# Patient Record
Sex: MALE | Race: BLACK OR AFRICAN AMERICAN | NOT HISPANIC OR LATINO | Employment: OTHER | URBAN - METROPOLITAN AREA
[De-identification: names, ages, dates, MRNs, and addresses within clinical notes are randomized per-mention and may not be internally consistent; named-entity substitution may affect disease eponyms.]

---

## 2023-03-02 LAB
CAMPYLOBACTER GP: NOT DETECTED
NOROVIRUS GI/GII: NOT DETECTED
ROTAVIRUS A: NOT DETECTED
SALMONELLA SP.: NOT DETECTED
SHIGA TOXIN 1: NOT DETECTED
SHIGA TOXIN 2: NOT DETECTED
SHIGELLA SP.: NOT DETECTED
VIBRIO GRP.: NOT DETECTED
YERSINIA ENTEROCOLITICA: NOT DETECTED

## 2023-03-05 LAB
FAT, FECAL - NEUTRAL: NORMAL
FAT, FECAL - SPLIT: NORMAL

## 2023-03-06 LAB — PANCREATIC ELASTASE, FECAL: >800 UG/G

## 2023-05-03 DIAGNOSIS — N52.9 ERECTILE DYSFUNCTION, UNSPECIFIED ERECTILE DYSFUNCTION TYPE: Primary | ICD-10-CM

## 2023-05-03 RX ORDER — TADALAFIL 5 MG/1
1 TABLET ORAL DAILY
COMMUNITY
Start: 2022-10-31 | End: 2023-05-03 | Stop reason: SDUPTHER

## 2023-05-03 RX ORDER — TADALAFIL 5 MG/1
5 TABLET ORAL DAILY
Qty: 90 TABLET | Refills: 0 | Status: SHIPPED | OUTPATIENT
Start: 2023-05-03 | End: 2023-06-14 | Stop reason: SDUPTHER

## 2023-05-10 ENCOUNTER — PATIENT OUTREACH (OUTPATIENT)
Dept: PRIMARY CARE | Facility: CLINIC | Age: 73
End: 2023-05-10
Payer: COMMERCIAL

## 2023-05-10 DIAGNOSIS — J44.1 ACUTE EXACERBATION OF CHRONIC OBSTRUCTIVE AIRWAYS DISEASE (MULTI): ICD-10-CM

## 2023-05-10 NOTE — PROGRESS NOTES
Discharge Facility:[facility]  Discharge Diagnosis:Acute Exacerbation of chronic obstructive airway disease  Admission Date:5/6/23  Discharge Date: 5/9/23    PCP Appointment Date:  Specialist Appointment Date:   Hospital Encounter and Summary: not available at this time   See discharge assessment below for further details   Engagement  Call Start Time: 1449 (5/10/2023  2:55 PM)    Medications  Medications reviewed with patient/caregiver?: Yes (5/10/2023  2:55 PM)  Is the patient having any side effects they believe may be caused by any medication additions or changes?: No (5/10/2023  2:55 PM)  Does the patient have all medications ordered at discharge?: Yes (5/10/2023  2:55 PM)  Prescription Comments: see med list (5/10/2023  2:55 PM)  Is the patient taking all medications as directed (includes completed medication regime)?: Yes (5/10/2023  2:55 PM)  Medication Comments: see med list (5/10/2023  2:55 PM)    Appointments  Does the patient have a primary care provider?: Yes (5/10/2023  2:55 PM)  Care Management Interventions: Advised patient to make appointment (patient will call office to schedule appt) (5/10/2023  2:55 PM)  Care Management Interventions: Advised to schedule with specialist (5/10/2023  2:55 PM)    Patient Teaching  Does the patient have access to their discharge instructions?: Yes (5/10/2023  2:55 PM)  Care Management Interventions: Reviewed instructions with patient (5/10/2023  2:55 PM)  What is the patient's perception of their health status since discharge?: Improving (5/10/2023  2:55 PM)

## 2023-05-16 DIAGNOSIS — G62.9 NEUROPATHY: Primary | ICD-10-CM

## 2023-05-17 RX ORDER — GABAPENTIN 100 MG/1
200 CAPSULE ORAL 3 TIMES DAILY
Qty: 540 CAPSULE | Refills: 0 | Status: SHIPPED | OUTPATIENT
Start: 2023-05-17 | End: 2023-07-31

## 2023-05-17 NOTE — TELEPHONE ENCOUNTER
Patient requesting refill of gabapentin  OARRS reviewed 5/17/2023  I have personally reviewed the OARRS report and I have considered the risk of abuse, dependence, addiction, and diversion. I believe it is clinically appropriate for this patient to be prescribed this medication based on documented diagnosis  Last fill 2/7/23 x 90days

## 2023-05-25 LAB
ALBUMIN (G/DL) IN SER/PLAS: 3.8 G/DL (ref 3.4–5)
ALBUMIN (MG/L) IN URINE: 106 MG/L
ALBUMIN/CREATININE (UG/MG) IN URINE: 164.1 UG/MG CRT (ref 0–30)
ANION GAP IN SER/PLAS: 12 MMOL/L (ref 10–20)
CALCIUM (MG/DL) IN SER/PLAS: 9.5 MG/DL (ref 8.6–10.6)
CARBON DIOXIDE, TOTAL (MMOL/L) IN SER/PLAS: 27 MMOL/L (ref 21–32)
CHLORIDE (MMOL/L) IN SER/PLAS: 111 MMOL/L (ref 98–107)
CREATININE (MG/DL) IN SER/PLAS: 2.44 MG/DL (ref 0.5–1.3)
CREATININE (MG/DL) IN URINE: 64.6 MG/DL (ref 20–370)
GFR MALE: 27 ML/MIN/1.73M2
GLUCOSE (MG/DL) IN SER/PLAS: 141 MG/DL (ref 74–99)
PARATHYRIN INTACT (PG/ML) IN SER/PLAS: 107 PG/ML (ref 18.5–88)
PHOSPHATE (MG/DL) IN SER/PLAS: 3.9 MG/DL (ref 2.5–4.9)
POTASSIUM (MMOL/L) IN SER/PLAS: 5 MMOL/L (ref 3.5–5.3)
SODIUM (MMOL/L) IN SER/PLAS: 145 MMOL/L (ref 136–145)
UREA NITROGEN (MG/DL) IN SER/PLAS: 27 MG/DL (ref 6–23)

## 2023-06-08 NOTE — PROGRESS NOTES
Unable to reach patient for call back after patient's follow up appointment with PCP.  LVM  with call back number for patient to call if needed.  Patient never followed up with PCP.

## 2023-06-13 PROBLEM — N18.9 CHRONIC KIDNEY DISEASE, UNSPECIFIED: Status: ACTIVE | Noted: 2023-06-13

## 2023-06-13 PROBLEM — R22.0 MASS OF SCALP: Status: ACTIVE | Noted: 2023-06-13

## 2023-06-13 PROBLEM — R19.5 LOOSE STOOLS: Status: ACTIVE | Noted: 2023-06-13

## 2023-06-13 PROBLEM — N18.30 CKD (CHRONIC KIDNEY DISEASE), STAGE III (MULTI): Status: ACTIVE | Noted: 2023-06-13

## 2023-06-13 PROBLEM — R33.8 ACUTE URINARY RETENTION: Status: RESOLVED | Noted: 2023-06-13 | Resolved: 2023-06-13

## 2023-06-13 PROBLEM — G47.00 INSOMNIA: Status: ACTIVE | Noted: 2023-06-13

## 2023-06-13 PROBLEM — M1A.9XX0 CHRONIC GOUT: Status: ACTIVE | Noted: 2023-06-13

## 2023-06-13 PROBLEM — I25.10 CORONARY ARTERY CALCIFICATION: Status: ACTIVE | Noted: 2023-06-13

## 2023-06-13 PROBLEM — N28.89 LEFT RENAL MASS: Status: ACTIVE | Noted: 2023-06-13

## 2023-06-13 PROBLEM — E21.3 HYPERPARATHYROIDISM (MULTI): Status: ACTIVE | Noted: 2023-06-13

## 2023-06-13 PROBLEM — R35.1 NOCTURIA MORE THAN TWICE PER NIGHT: Status: ACTIVE | Noted: 2023-06-13

## 2023-06-13 PROBLEM — D64.9 ANEMIA: Status: ACTIVE | Noted: 2023-06-13

## 2023-06-13 PROBLEM — R35.89 POLYURIA: Status: ACTIVE | Noted: 2023-06-13

## 2023-06-13 PROBLEM — J20.9 ACUTE BRONCHITIS: Status: RESOLVED | Noted: 2023-06-13 | Resolved: 2023-06-13

## 2023-06-13 PROBLEM — E78.00 HYPERCHOLESTEREMIA: Status: ACTIVE | Noted: 2023-06-13

## 2023-06-13 PROBLEM — C64.2 MALIGNANT NEOPLASM OF LEFT KIDNEY (MULTI): Status: ACTIVE | Noted: 2023-06-13

## 2023-06-13 PROBLEM — G62.9 NEUROPATHY: Status: ACTIVE | Noted: 2023-06-13

## 2023-06-13 PROBLEM — K86.1 CHRONIC PANCREATITIS (MULTI): Status: ACTIVE | Noted: 2023-06-13

## 2023-06-13 PROBLEM — G64 DISORDER OF PERIPHERAL NERVOUS SYSTEM: Status: ACTIVE | Noted: 2023-06-13

## 2023-06-13 PROBLEM — E46 MALNUTRITION (MULTI): Status: ACTIVE | Noted: 2023-06-13

## 2023-06-13 PROBLEM — J93.9 PNEUMOTHORAX: Status: ACTIVE | Noted: 2023-06-13

## 2023-06-13 PROBLEM — E04.1 NONTOXIC SINGLE THYROID NODULE: Status: ACTIVE | Noted: 2023-06-13

## 2023-06-13 PROBLEM — I10 BENIGN ESSENTIAL HYPERTENSION: Status: ACTIVE | Noted: 2023-06-13

## 2023-06-13 PROBLEM — N20.0 NEPHROLITHIASIS: Status: ACTIVE | Noted: 2023-06-13

## 2023-06-13 PROBLEM — E53.8 VITAMIN B12 DEFICIENCY (NON ANEMIC): Status: ACTIVE | Noted: 2023-06-13

## 2023-06-13 PROBLEM — H25.13 AGE-RELATED NUCLEAR CATARACT OF BOTH EYES: Status: ACTIVE | Noted: 2023-06-13

## 2023-06-13 PROBLEM — I31.39 PERICARDIAL EFFUSION (HHS-HCC): Status: ACTIVE | Noted: 2023-06-13

## 2023-06-13 PROBLEM — E11.40 DIABETIC NEUROPATHY (MULTI): Status: ACTIVE | Noted: 2023-06-13

## 2023-06-13 PROBLEM — H43.23 ASTEROID HYALOSIS OF BOTH EYES: Status: ACTIVE | Noted: 2023-06-13

## 2023-06-13 PROBLEM — J43.9 BLEB, LUNG (MULTI): Status: ACTIVE | Noted: 2023-06-13

## 2023-06-13 PROBLEM — I25.84 CORONARY ARTERY CALCIFICATION: Status: ACTIVE | Noted: 2023-06-13

## 2023-06-13 PROBLEM — R93.89 ABNORMAL CHEST CT: Status: ACTIVE | Noted: 2023-06-13

## 2023-06-13 PROBLEM — E11.3293 MILD NONPROLIFERATIVE DIABETIC RETINOPATHY OF BOTH EYES WITHOUT MACULAR EDEMA (MULTI): Status: ACTIVE | Noted: 2023-06-13

## 2023-06-13 PROBLEM — E55.9 VITAMIN D DEFICIENCY: Status: ACTIVE | Noted: 2023-06-13

## 2023-06-13 PROBLEM — K76.9 CHRONIC LIVER DISEASE: Status: ACTIVE | Noted: 2023-06-13

## 2023-06-13 PROBLEM — E83.51 HYPOCALCEMIA: Status: ACTIVE | Noted: 2023-06-13

## 2023-06-13 PROBLEM — F10.10 ALCOHOL ABUSE: Status: ACTIVE | Noted: 2023-06-13

## 2023-06-13 PROBLEM — M79.672 ACUTE FOOT PAIN, LEFT: Status: RESOLVED | Noted: 2023-06-13 | Resolved: 2023-06-13

## 2023-06-13 PROBLEM — R31.29 MICROSCOPIC HEMATURIA: Status: ACTIVE | Noted: 2023-06-13

## 2023-06-13 PROBLEM — N52.9 ED (ERECTILE DYSFUNCTION): Status: ACTIVE | Noted: 2023-06-13

## 2023-06-13 PROBLEM — C64.9 RENAL CELL CARCINOMA (MULTI): Status: ACTIVE | Noted: 2023-06-13

## 2023-06-13 PROBLEM — E53.8 FOLIC ACID DEFICIENCY: Status: ACTIVE | Noted: 2023-06-13

## 2023-06-13 PROBLEM — R09.89 CHEST CONGESTION: Status: ACTIVE | Noted: 2023-06-13

## 2023-06-13 PROBLEM — N40.0 BPH (BENIGN PROSTATIC HYPERPLASIA): Status: ACTIVE | Noted: 2023-06-13

## 2023-06-13 PROBLEM — K76.9 LIVER LESION: Status: ACTIVE | Noted: 2023-06-13

## 2023-06-13 PROBLEM — K86.9 LESION OF PANCREAS (HHS-HCC): Status: ACTIVE | Noted: 2023-06-13

## 2023-06-13 PROBLEM — J96.10 CHRONIC RESPIRATORY FAILURE (MULTI): Status: ACTIVE | Noted: 2023-06-13

## 2023-06-13 PROBLEM — R91.8 LUNG NODULES: Status: ACTIVE | Noted: 2023-06-13

## 2023-06-13 PROBLEM — N20.1 LEFT URETERAL CALCULUS: Status: ACTIVE | Noted: 2023-06-13

## 2023-06-13 PROBLEM — J44.9 COPD (CHRONIC OBSTRUCTIVE PULMONARY DISEASE) (MULTI): Status: ACTIVE | Noted: 2023-06-13

## 2023-06-13 PROBLEM — R09.02 HYPOXIA: Status: ACTIVE | Noted: 2023-06-13

## 2023-06-13 PROBLEM — K21.00 GERD WITH ESOPHAGITIS: Status: ACTIVE | Noted: 2023-06-13

## 2023-06-13 PROBLEM — N17.9 AKI (ACUTE KIDNEY INJURY) (CMS-HCC): Status: ACTIVE | Noted: 2023-06-13

## 2023-06-13 PROBLEM — J90 PLEURAL EFFUSION: Status: ACTIVE | Noted: 2023-06-13

## 2023-06-13 PROBLEM — R53.83 FATIGUE: Status: ACTIVE | Noted: 2023-06-13

## 2023-06-13 PROBLEM — K86.3 PSEUDOCYST OF PANCREAS (HHS-HCC): Status: ACTIVE | Noted: 2023-06-13

## 2023-06-13 PROBLEM — R00.0 TACHYCARDIA: Status: ACTIVE | Noted: 2023-06-13

## 2023-06-13 PROBLEM — K85.20 ALCOHOL-INDUCED ACUTE PANCREATITIS WITHOUT INFECTION OR NECROSIS (HHS-HCC): Status: ACTIVE | Noted: 2023-06-13

## 2023-06-13 PROBLEM — D37.8 NEOPLASM OF UNCERTAIN BEHAVIOR OF PANCREAS: Status: ACTIVE | Noted: 2023-06-13

## 2023-06-14 ENCOUNTER — OFFICE VISIT (OUTPATIENT)
Dept: PRIMARY CARE | Facility: CLINIC | Age: 73
End: 2023-06-14
Payer: COMMERCIAL

## 2023-06-14 VITALS
OXYGEN SATURATION: 96 % | WEIGHT: 132 LBS | HEART RATE: 60 BPM | BODY MASS INDEX: 20.67 KG/M2 | DIASTOLIC BLOOD PRESSURE: 68 MMHG | SYSTOLIC BLOOD PRESSURE: 127 MMHG

## 2023-06-14 DIAGNOSIS — C64.2 RENAL CELL CARCINOMA OF LEFT KIDNEY (MULTI): ICD-10-CM

## 2023-06-14 DIAGNOSIS — E78.00 HYPERCHOLESTEREMIA: ICD-10-CM

## 2023-06-14 DIAGNOSIS — N52.9 ERECTILE DYSFUNCTION, UNSPECIFIED ERECTILE DYSFUNCTION TYPE: ICD-10-CM

## 2023-06-14 DIAGNOSIS — J96.11 CHRONIC RESPIRATORY FAILURE WITH HYPOXIA (MULTI): ICD-10-CM

## 2023-06-14 DIAGNOSIS — I10 BENIGN ESSENTIAL HYPERTENSION: Primary | ICD-10-CM

## 2023-06-14 DIAGNOSIS — E11.3293 MILD NONPROLIFERATIVE DIABETIC RETINOPATHY OF BOTH EYES WITHOUT MACULAR EDEMA ASSOCIATED WITH TYPE 2 DIABETES MELLITUS (MULTI): ICD-10-CM

## 2023-06-14 DIAGNOSIS — D69.6 THROMBOCYTOPENIA (CMS-HCC): ICD-10-CM

## 2023-06-14 DIAGNOSIS — E21.3 HYPERPARATHYROIDISM (MULTI): ICD-10-CM

## 2023-06-14 DIAGNOSIS — K86.0 ALCOHOL-INDUCED CHRONIC PANCREATITIS (MULTI): ICD-10-CM

## 2023-06-14 DIAGNOSIS — E44.1 MILD PROTEIN-CALORIE MALNUTRITION (MULTI): ICD-10-CM

## 2023-06-14 DIAGNOSIS — N18.4 STAGE 4 CHRONIC KIDNEY DISEASE (MULTI): ICD-10-CM

## 2023-06-14 PROCEDURE — 4010F ACE/ARB THERAPY RXD/TAKEN: CPT | Performed by: PHYSICIAN ASSISTANT

## 2023-06-14 PROCEDURE — 3078F DIAST BP <80 MM HG: CPT | Performed by: PHYSICIAN ASSISTANT

## 2023-06-14 PROCEDURE — 1160F RVW MEDS BY RX/DR IN RCRD: CPT | Performed by: PHYSICIAN ASSISTANT

## 2023-06-14 PROCEDURE — 1157F ADVNC CARE PLAN IN RCRD: CPT | Performed by: PHYSICIAN ASSISTANT

## 2023-06-14 PROCEDURE — 1159F MED LIST DOCD IN RCRD: CPT | Performed by: PHYSICIAN ASSISTANT

## 2023-06-14 PROCEDURE — 3044F HG A1C LEVEL LT 7.0%: CPT | Performed by: PHYSICIAN ASSISTANT

## 2023-06-14 PROCEDURE — 3008F BODY MASS INDEX DOCD: CPT | Performed by: PHYSICIAN ASSISTANT

## 2023-06-14 PROCEDURE — 3074F SYST BP LT 130 MM HG: CPT | Performed by: PHYSICIAN ASSISTANT

## 2023-06-14 PROCEDURE — 99214 OFFICE O/P EST MOD 30 MIN: CPT | Performed by: PHYSICIAN ASSISTANT

## 2023-06-14 RX ORDER — CALCITRIOL 0.25 UG/1
1 CAPSULE ORAL DAILY
COMMUNITY
Start: 2020-08-18 | End: 2023-11-13

## 2023-06-14 RX ORDER — LOSARTAN POTASSIUM 50 MG/1
1 TABLET ORAL DAILY
COMMUNITY
Start: 2021-05-06 | End: 2023-11-13

## 2023-06-14 RX ORDER — FINASTERIDE 5 MG/1
1 TABLET, FILM COATED ORAL DAILY
COMMUNITY
Start: 2019-04-19 | End: 2023-11-02

## 2023-06-14 RX ORDER — OXYCODONE AND ACETAMINOPHEN 5; 325 MG/1; MG/1
1 TABLET ORAL EVERY 6 HOURS
Qty: 12 TABLET | Refills: 0 | COMMUNITY
Start: 2022-09-05 | End: 2022-09-08

## 2023-06-14 RX ORDER — FOLIC ACID 1 MG/1
1 TABLET ORAL DAILY
COMMUNITY
Start: 2020-11-05 | End: 2024-03-04 | Stop reason: SDUPTHER

## 2023-06-14 RX ORDER — ACETAMINOPHEN 325 MG/1
TABLET ORAL
COMMUNITY
Start: 2022-10-28 | End: 2023-06-14 | Stop reason: ALTCHOICE

## 2023-06-14 RX ORDER — ROSUVASTATIN CALCIUM 20 MG/1
1 TABLET, COATED ORAL NIGHTLY
COMMUNITY
Start: 2021-01-14 | End: 2023-11-13

## 2023-06-14 RX ORDER — DAPAGLIFLOZIN 10 MG/1
1 TABLET, FILM COATED ORAL
COMMUNITY
Start: 2023-01-31 | End: 2023-10-09 | Stop reason: SDUPTHER

## 2023-06-14 RX ORDER — TADALAFIL 5 MG/1
5 TABLET ORAL DAILY
Qty: 30 TABLET | Refills: 0 | Status: SHIPPED | OUTPATIENT
Start: 2023-06-14 | End: 2024-06-10 | Stop reason: WASHOUT

## 2023-06-14 RX ORDER — METOPROLOL SUCCINATE 25 MG/1
1 TABLET, EXTENDED RELEASE ORAL DAILY
COMMUNITY
Start: 2021-01-14 | End: 2023-10-30 | Stop reason: SDUPTHER

## 2023-06-14 RX ORDER — CHLORHEXIDINE GLUCONATE ORAL RINSE 1.2 MG/ML
SOLUTION DENTAL
COMMUNITY
Start: 2022-10-13 | End: 2023-06-14 | Stop reason: ALTCHOICE

## 2023-06-14 RX ORDER — TAMSULOSIN HYDROCHLORIDE 0.4 MG/1
1 CAPSULE ORAL DAILY
COMMUNITY
Start: 2018-02-26 | End: 2023-11-02

## 2023-06-14 RX ORDER — ACETAMINOPHEN 500 MG
TABLET ORAL
COMMUNITY
End: 2023-06-14 | Stop reason: ALTCHOICE

## 2023-06-14 RX ORDER — AMOXICILLIN 500 MG/1
CAPSULE ORAL
COMMUNITY
Start: 2023-01-11 | End: 2023-06-14 | Stop reason: ALTCHOICE

## 2023-06-14 RX ORDER — IBUPROFEN 600 MG/1
TABLET ORAL
COMMUNITY
Start: 2022-10-28 | End: 2023-06-14 | Stop reason: ALTCHOICE

## 2023-06-14 RX ORDER — TADALAFIL 5 MG/1
1 TABLET ORAL DAILY
COMMUNITY
Start: 2022-10-31 | End: 2023-06-14 | Stop reason: ALTCHOICE

## 2023-06-14 RX ORDER — GABAPENTIN 100 MG/1
CAPSULE ORAL
COMMUNITY
Start: 2021-06-04 | End: 2023-06-14 | Stop reason: ALTCHOICE

## 2023-06-14 RX ORDER — PREDNISONE 10 MG/1
TABLET ORAL
COMMUNITY
Start: 2023-05-09 | End: 2023-06-14 | Stop reason: ALTCHOICE

## 2023-06-14 RX ORDER — ALBUTEROL SULFATE 90 UG/1
2 AEROSOL, METERED RESPIRATORY (INHALATION) EVERY 4 HOURS PRN
COMMUNITY
Start: 2021-05-17

## 2023-06-14 RX ORDER — ONDANSETRON 4 MG/1
TABLET, ORALLY DISINTEGRATING ORAL
COMMUNITY
Start: 2022-09-02 | End: 2023-06-14 | Stop reason: ALTCHOICE

## 2023-06-14 RX ORDER — BUDESONIDE, GLYCOPYRROLATE, AND FORMOTEROL FUMARATE 160; 9; 4.8 UG/1; UG/1; UG/1
2 AEROSOL, METERED RESPIRATORY (INHALATION) 2 TIMES DAILY
COMMUNITY
Start: 2021-04-20

## 2023-06-14 RX ORDER — COLCHICINE 0.6 MG/1
TABLET ORAL
COMMUNITY
Start: 2022-10-11 | End: 2023-10-30 | Stop reason: ALTCHOICE

## 2023-06-14 RX ORDER — IPRATROPIUM BROMIDE AND ALBUTEROL SULFATE 2.5; .5 MG/3ML; MG/3ML
SOLUTION RESPIRATORY (INHALATION) 4 TIMES DAILY
COMMUNITY
End: 2023-12-04

## 2023-06-14 ASSESSMENT — ENCOUNTER SYMPTOMS
DEPRESSION: 0
LOSS OF SENSATION IN FEET: 0
OCCASIONAL FEELINGS OF UNSTEADINESS: 0

## 2023-06-14 NOTE — PROGRESS NOTES
Subjective   Charly Cornejo is a 72 y.o. male who presents for follow up, bumped head,.  HPI Charly Cornejo is a 72 y.o. male presenting for a follow up. Generally doing well. Currently c/o:    Hospital discharge follow up: atypical PNA at [facility]. Was treated and discharged. He was dissatisfied with his care while admitted.  He is back to baseline now without any chest discomfort, worsening cough or SOB/BOYD or wheezing.  Compliant with inhalers.    Bumped head: was getting off the [employer] bus and bumped the top of his head. He was offered EMS services, but he declined. He did not have any HA, dizziness, presyncope/syncope, or other neurodeficits. He feels perfectly fine with the exception of a very mild tenderness with palpation to the top of the head.     HTN: stable on current regimen. Does not check BP at home.  Denies any headache, dizziness, chest pain, SOB/BOYD, palpitations, LE edema.  Follows with cardiology    CKD, stage IV: stable, follows with nephrology.  Does his best to drink adequate water    Gout: no recent flares. Last uric acid level was 8.4.  Has colchicine to take as needed    COPD, chronic respiratory failure, nicotine dependence: Stable.  On 4.5 L/min of supplemental O2.  Scheduled for a CT chest scan soon.  Follows with pulmonary medicine, next appointment in August.  Stable on Breztri inhaler for maintenance and albuterol as needed    Renal cell carcinoma: s/p L nephrectomy for pT3a papillary RCC. Follows with urology, last seen 2/17/2022. MRI kidney (9/2/22) with stable appearance of left nephrectomy site, without evidence of recurrent or metastatic disease.    12 point ROS reviewed and negative other than as stated in HPI    /68   Pulse 60   Wt 59.9 kg (132 lb)   SpO2 96%   BMI 20.67 kg/m²   Objective   Physical Exam  Vitals reviewed.   Constitutional:       General: He is not in acute distress.     Appearance: Normal appearance. He is not ill-appearing.   HENT:      Head:  Normocephalic and atraumatic.   Eyes:      General: No scleral icterus.     Extraocular Movements: Extraocular movements intact.      Conjunctiva/sclera: Conjunctivae normal.      Pupils: Pupils are equal, round, and reactive to light.      Comments: Wears glasses   Cardiovascular:      Rate and Rhythm: Normal rate and regular rhythm.      Heart sounds: Normal heart sounds. No murmur heard.     No friction rub. No gallop.   Pulmonary:      Effort: Pulmonary effort is normal. No respiratory distress.      Breath sounds: Normal breath sounds. No stridor. No wheezing, rhonchi or rales.      Comments: On 4.5 L/min supplemental O2.  Musculoskeletal:      Cervical back: Normal range of motion.      Right lower leg: No edema.      Left lower leg: No edema.   Skin:     General: Skin is warm and dry.   Neurological:      Mental Status: He is alert and oriented to person, place, and time. Mental status is at baseline.      Cranial Nerves: No cranial nerve deficit.      Gait: Gait normal.   Psychiatric:         Mood and Affect: Mood normal.         Behavior: Behavior normal.         Assessment/Plan   Problem List Items Addressed This Visit       Benign essential hypertension - Primary     Well-controlled.  Continue current regimen.  Follow strict DASH diet and exercise as tolerated         Chronic pancreatitis (CMS/HCC)     Stable.  Follows with gastroenterology.         Chronic respiratory failure (CMS/HCC)     Stable.  Follow with pulmonary medicine.  Complete CT chest scan as ordered.  Continue Breztri inhaler and supplemental O2 4.5 L/min.         Chronic kidney disease, unspecified     Avoid nephrotoxic medications and drink adequate water.  Continue Farxiga 10 mg.  Follow with nephrology.         Hypercholesteremia     Stable.  Continue rosuvastatin 20 mg.  Follow a Mediterranean diet and exercise as tolerated         Hyperparathyroidism (CMS/HCC)     Continue calcitriol.  Follow with nephrology.         Renal cell  carcinoma (CMS/HCC)     S/p L nephrectomy for pT3a papillary RCC. Follows with urology, last seen 2/17/2022. MRI kidney (9/2/22) with stable appearance of left nephrectomy site, without evidence of recurrent or metastatic disease.         Malnutrition (CMS/HCC)     Stable, gained 2 pounds since last appointment.  Follow a  well-rounded diet.  Consider supplementing with boost or Ensure nutritional drinks.         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  Complete annual diabetic eye exams.         Thrombocytopenia (CMS/HCC)     Stable.  CBC 1/20/2023 showed platelet count of 134.  No abnormal bleeding or bruising.         ED (erectile dysfunction)    Relevant Medications    tadalafil (Cialis) 5 mg tablet        Follow-up in 4 months or sooner as needed

## 2023-06-16 PROBLEM — D69.6 THROMBOCYTOPENIA (CMS-HCC): Status: ACTIVE | Noted: 2023-06-16

## 2023-06-16 PROBLEM — N17.9 AKI (ACUTE KIDNEY INJURY) (CMS-HCC): Status: RESOLVED | Noted: 2023-06-13 | Resolved: 2023-06-16

## 2023-06-16 PROBLEM — J93.9 PNEUMOTHORAX: Status: RESOLVED | Noted: 2023-06-13 | Resolved: 2023-06-16

## 2023-06-16 PROBLEM — N18.30 CKD (CHRONIC KIDNEY DISEASE), STAGE III (MULTI): Status: RESOLVED | Noted: 2023-06-13 | Resolved: 2023-06-16

## 2023-06-16 PROBLEM — R35.89 POLYURIA: Status: RESOLVED | Noted: 2023-06-13 | Resolved: 2023-06-16

## 2023-06-16 PROBLEM — K85.20 ALCOHOL-INDUCED ACUTE PANCREATITIS WITHOUT INFECTION OR NECROSIS (HHS-HCC): Status: RESOLVED | Noted: 2023-06-13 | Resolved: 2023-06-16

## 2023-06-16 PROBLEM — F10.10 ALCOHOL ABUSE: Status: RESOLVED | Noted: 2023-06-13 | Resolved: 2023-06-16

## 2023-06-16 PROBLEM — C64.2 MALIGNANT NEOPLASM OF LEFT KIDNEY (MULTI): Status: RESOLVED | Noted: 2023-06-13 | Resolved: 2023-06-16

## 2023-06-16 PROBLEM — J43.9 BLEB, LUNG (MULTI): Status: RESOLVED | Noted: 2023-06-13 | Resolved: 2023-06-16

## 2023-06-16 PROBLEM — J44.9 COPD (CHRONIC OBSTRUCTIVE PULMONARY DISEASE) (MULTI): Status: RESOLVED | Noted: 2023-06-13 | Resolved: 2023-06-16

## 2023-06-16 NOTE — ASSESSMENT & PLAN NOTE
Avoid nephrotoxic medications and drink adequate water.  Continue Farxiga 10 mg.  Follow with nephrology.

## 2023-06-16 NOTE — ASSESSMENT & PLAN NOTE
Stable, gained 2 pounds since last appointment.  Follow a  well-rounded diet.  Consider supplementing with boost or Ensure nutritional drinks.

## 2023-06-16 NOTE — ASSESSMENT & PLAN NOTE
S/p L nephrectomy for pT3a papillary RCC. Follows with urology, last seen 2/17/2022. MRI kidney (9/2/22) with stable appearance of left nephrectomy site, without evidence of recurrent or metastatic disease.

## 2023-06-16 NOTE — ASSESSMENT & PLAN NOTE
Stable.  Follow with pulmonary medicine.  Complete CT chest scan as ordered.  Continue Breztri inhaler and supplemental O2 4.5 L/min.

## 2023-07-07 ENCOUNTER — PATIENT OUTREACH (OUTPATIENT)
Dept: PRIMARY CARE | Facility: CLINIC | Age: 73
End: 2023-07-07
Payer: COMMERCIAL

## 2023-07-07 DIAGNOSIS — R10.9 FLANK PAIN: ICD-10-CM

## 2023-07-07 NOTE — PROGRESS NOTES
Discharge Facility:[facility], [place], operated by [facility]  Discharge Diagnosis:flank pain  Admission Date:7/4/23  Discharge Date: 7/6/23    PCP Appointment Date:patient needs appt  Specialist Appointment Date:   Hospital Encounter and Summary:  not available at this time   See discharge assessment below for further details  Engagement  Call Start Time: 1540 (7/7/2023  4:32 PM)    Medications  Medications reviewed with patient/caregiver?: Yes (7/7/2023  4:32 PM)  Is the patient having any side effects they believe may be caused by any medication additions or changes?: No (7/7/2023  4:32 PM)  Does the patient have all medications ordered at discharge?: Yes (7/7/2023  4:32 PM)  Prescription Comments: see med list (7/7/2023  4:32 PM)  Is the patient taking all medications as directed (includes completed medication regime)?: Yes (7/7/2023  4:32 PM)    Appointments  Does the patient have a primary care provider?: Yes (7/7/2023  4:32 PM)  Care Management Interventions: Educated patient on importance of making appointment (7/7/2023  4:32 PM)  Has the patient kept scheduled appointments due by today?: Yes (7/7/2023  4:32 PM)    Patient Teaching  Does the patient have access to their discharge instructions?: Yes (7/7/2023  4:32 PM)  Care Management Interventions: Reviewed instructions with patient (7/7/2023  4:32 PM)  What is the patient's perception of their health status since discharge?: Improving (7/7/2023  4:32 PM)  Is the patient/caregiver able to teach back the hierarchy of who to call/visit for symptoms/problems? PCP, Specialist, Home Health nurse, Urgent Care, ED, 911: Yes (7/7/2023  4:32 PM)

## 2023-07-28 DIAGNOSIS — G62.9 NEUROPATHY: ICD-10-CM

## 2023-07-31 RX ORDER — GABAPENTIN 100 MG/1
200 CAPSULE ORAL 3 TIMES DAILY
Qty: 540 CAPSULE | Refills: 0 | Status: SHIPPED | OUTPATIENT
Start: 2023-07-31 | End: 2023-10-12

## 2023-07-31 NOTE — TELEPHONE ENCOUNTER
Requesting refill of gabapentin  OARRS reviewed 7/31/23  I have personally reviewed the OARRS report and I have considered the risk of abuse, dependence, addiction, and diversion. I believe it is clinically appropriate for this patient to be prescribed this medication based on documented diagnosis  Last fill 5/17/23 x 90 days. Not due until 8/14/23

## 2023-08-11 ENCOUNTER — TELEPHONE (OUTPATIENT)
Dept: PRIMARY CARE | Facility: CLINIC | Age: 73
End: 2023-08-11
Payer: COMMERCIAL

## 2023-08-11 NOTE — TELEPHONE ENCOUNTER
Pharmacist calling in wants to know if you are willing to reduce pt rosuvastatin to 10mg due to kidney diease

## 2023-08-21 LAB — PARATHYRIN INTACT (PG/ML) IN SER/PLAS: 63 PG/ML (ref 18.5–88)

## 2023-08-22 LAB
ALBUMIN (G/DL) IN SER/PLAS: 4.4 G/DL (ref 3.4–5)
ALBUMIN (MG/L) IN URINE: 155 MG/L
ALBUMIN/CREATININE (UG/MG) IN URINE: 127 UG/MG CRT (ref 0–30)
ANION GAP IN SER/PLAS: 13 MMOL/L (ref 10–20)
CALCIUM (MG/DL) IN SER/PLAS: 10 MG/DL (ref 8.6–10.6)
CARBON DIOXIDE, TOTAL (MMOL/L) IN SER/PLAS: 28 MMOL/L (ref 21–32)
CHLORIDE (MMOL/L) IN SER/PLAS: 105 MMOL/L (ref 98–107)
CREATININE (MG/DL) IN SER/PLAS: 2.25 MG/DL (ref 0.5–1.3)
CREATININE (MG/DL) IN URINE: 122 MG/DL (ref 20–370)
GFR MALE: 30 ML/MIN/1.73M2
GLUCOSE (MG/DL) IN SER/PLAS: 81 MG/DL (ref 74–99)
PHOSPHATE (MG/DL) IN SER/PLAS: 3.9 MG/DL (ref 2.5–4.9)
POTASSIUM (MMOL/L) IN SER/PLAS: 4.5 MMOL/L (ref 3.5–5.3)
SODIUM (MMOL/L) IN SER/PLAS: 141 MMOL/L (ref 136–145)
UREA NITROGEN (MG/DL) IN SER/PLAS: 22 MG/DL (ref 6–23)

## 2023-08-31 RX ORDER — AMLODIPINE BESYLATE 5 MG/1
TABLET ORAL
COMMUNITY
End: 2023-10-30 | Stop reason: SDUPTHER

## 2023-08-31 RX ORDER — CALCIUM CARBONATE 500(1250)
1 TABLET ORAL
COMMUNITY
End: 2023-10-30 | Stop reason: ALTCHOICE

## 2023-08-31 RX ORDER — CHOLECALCIFEROL (VITAMIN D3) 125 MCG
1 CAPSULE ORAL DAILY
COMMUNITY

## 2023-08-31 RX ORDER — INFLUENZA VIRUS VACCINE 15; 15; 15; 15 UG/.5ML; UG/.5ML; UG/.5ML; UG/.5ML
SUSPENSION INTRAMUSCULAR
COMMUNITY
Start: 2022-12-14 | End: 2024-06-10 | Stop reason: WASHOUT

## 2023-08-31 RX ORDER — OMEPRAZOLE 20 MG/1
1 CAPSULE, DELAYED RELEASE ORAL
COMMUNITY

## 2023-08-31 RX ORDER — ATORVASTATIN CALCIUM 20 MG/1
20 TABLET, FILM COATED ORAL
COMMUNITY
End: 2023-11-12 | Stop reason: WASHOUT

## 2023-08-31 RX ORDER — POTASSIUM CHLORIDE 1.5 G/1.58G
20 POWDER, FOR SOLUTION ORAL DAILY
COMMUNITY
End: 2024-02-09 | Stop reason: ALTCHOICE

## 2023-08-31 RX ORDER — ACETAMINOPHEN 500 MG
TABLET ORAL
COMMUNITY
End: 2023-10-30 | Stop reason: SDUPTHER

## 2023-08-31 RX ORDER — KETOCONAZOLE 20 MG/G
1 CREAM TOPICAL
COMMUNITY
Start: 2016-03-09 | End: 2023-10-30 | Stop reason: ALTCHOICE

## 2023-08-31 RX ORDER — ACETAMINOPHEN 325 MG/1
650 TABLET ORAL EVERY 6 HOURS PRN
COMMUNITY
Start: 2019-01-02

## 2023-08-31 RX ORDER — ASPIRIN 81 MG/1
81 TABLET ORAL DAILY PRN
COMMUNITY
End: 2024-02-09 | Stop reason: WASHOUT

## 2023-08-31 RX ORDER — METAXALONE 800 MG/1
800 TABLET ORAL EVERY 8 HOURS PRN
COMMUNITY
Start: 2019-01-02 | End: 2023-10-30 | Stop reason: ALTCHOICE

## 2023-08-31 RX ORDER — SODIUM BICARBONATE 650 MG/1
1 TABLET ORAL
COMMUNITY
End: 2023-10-30 | Stop reason: ALTCHOICE

## 2023-08-31 RX ORDER — FERROUS SULFATE 325(65) MG
1 TABLET ORAL
COMMUNITY

## 2023-08-31 RX ORDER — BENZONATATE 100 MG/1
1 CAPSULE ORAL 3 TIMES DAILY PRN
COMMUNITY
End: 2023-10-30 | Stop reason: ALTCHOICE

## 2023-08-31 RX ORDER — AMLODIPINE BESYLATE 10 MG/1
10 TABLET ORAL
COMMUNITY

## 2023-08-31 RX ORDER — AMITRIPTYLINE HYDROCHLORIDE 10 MG/1
10 TABLET, FILM COATED ORAL NIGHTLY PRN
COMMUNITY
End: 2024-06-10 | Stop reason: WASHOUT

## 2023-08-31 RX ORDER — METOPROLOL SUCCINATE 25 MG/1
TABLET, EXTENDED RELEASE ORAL
COMMUNITY
Start: 2022-05-09 | End: 2023-11-13

## 2023-08-31 RX ORDER — LIDOCAINE 50 MG/G
1 PATCH TOPICAL
COMMUNITY
Start: 2019-01-02 | End: 2023-10-30 | Stop reason: ALTCHOICE

## 2023-08-31 RX ORDER — POLYETHYLENE GLYCOL 3350 17 G/17G
17 POWDER, FOR SOLUTION ORAL DAILY PRN
COMMUNITY
Start: 2023-07-06

## 2023-08-31 RX ORDER — ERGOCALCIFEROL 1.25 MG/1
50000 CAPSULE ORAL WEEKLY
COMMUNITY
Start: 2016-03-10 | End: 2023-10-30 | Stop reason: ALTCHOICE

## 2023-08-31 RX ORDER — PREDNISONE 10 MG/1
TABLET ORAL
COMMUNITY
Start: 2022-05-12 | End: 2023-10-30 | Stop reason: ALTCHOICE

## 2023-08-31 RX ORDER — ALLOPURINOL 100 MG/1
100 TABLET ORAL
COMMUNITY
Start: 2016-03-10

## 2023-08-31 RX ORDER — DOCUSATE SODIUM 100 MG/1
1 CAPSULE, LIQUID FILLED ORAL 2 TIMES DAILY PRN
COMMUNITY
Start: 2023-07-06

## 2023-09-01 PROBLEM — K21.9 GASTRO-ESOPHAGEAL REFLUX DISEASE WITHOUT ESOPHAGITIS: Status: ACTIVE | Noted: 2022-05-07

## 2023-09-01 PROBLEM — H25.811 COMBINED FORM OF AGE-RELATED CATARACT, RIGHT EYE: Status: ACTIVE | Noted: 2023-09-01

## 2023-09-01 PROBLEM — H52.10 MYOPIA: Status: ACTIVE | Noted: 2023-09-01

## 2023-09-01 PROBLEM — Z85.528 PERSONAL HISTORY OF OTHER MALIGNANT NEOPLASM OF KIDNEY: Status: ACTIVE | Noted: 2022-05-07

## 2023-09-01 PROBLEM — E11.9 DIABETES MELLITUS (MULTI): Status: ACTIVE | Noted: 2023-09-01

## 2023-09-01 PROBLEM — N18.9 ANEMIA DUE TO CHRONIC KIDNEY DISEASE: Status: ACTIVE | Noted: 2022-05-07

## 2023-09-01 PROBLEM — R94.31 ABNORMAL EKG: Status: ACTIVE | Noted: 2023-09-01

## 2023-09-01 PROBLEM — H40.003 GLAUCOMA SUSPECT, BOTH EYES: Status: ACTIVE | Noted: 2023-09-01

## 2023-09-01 PROBLEM — D63.1 ANEMIA DUE TO CHRONIC KIDNEY DISEASE: Status: ACTIVE | Noted: 2022-05-07

## 2023-09-01 PROBLEM — I20.9 ANGINA PECTORIS (CMS-HCC): Status: ACTIVE | Noted: 2023-09-01

## 2023-09-01 PROBLEM — N52.9 MALE ERECTILE DYSFUNCTION, UNSPECIFIED: Status: ACTIVE | Noted: 2022-05-07

## 2023-09-01 PROBLEM — Z99.81 DEPENDENCE ON SUPPLEMENTAL OXYGEN: Status: ACTIVE | Noted: 2022-05-07

## 2023-09-01 PROBLEM — E21.0 PRIMARY HYPERPARATHYROIDISM (MULTI): Status: ACTIVE | Noted: 2022-05-07

## 2023-09-01 PROBLEM — N20.2 CALCULUS OF KIDNEY WITH CALCULUS OF URETER: Status: ACTIVE | Noted: 2022-05-07

## 2023-09-01 PROBLEM — H25.812 COMBINED FORM OF AGE-RELATED CATARACT, LEFT EYE: Status: ACTIVE | Noted: 2023-09-01

## 2023-09-01 PROBLEM — J44.9 COPD (CHRONIC OBSTRUCTIVE PULMONARY DISEASE) (MULTI): Status: ACTIVE | Noted: 2023-09-01

## 2023-09-01 PROBLEM — N18.9 CHRONIC RENAL FAILURE SYNDROME: Status: ACTIVE | Noted: 2023-09-01

## 2023-09-01 PROBLEM — H02.539: Status: ACTIVE | Noted: 2023-09-01

## 2023-09-01 PROBLEM — F17.200 TOBACCO DEPENDENCE SYNDROME: Status: ACTIVE | Noted: 2023-09-01

## 2023-09-01 PROBLEM — I73.9 PERIPHERAL VASCULAR DISEASE (CMS-HCC): Status: ACTIVE | Noted: 2023-09-01

## 2023-09-01 PROBLEM — Z86.718 PERSONAL HISTORY OF OTHER VENOUS THROMBOSIS AND EMBOLISM: Status: ACTIVE | Noted: 2022-05-07

## 2023-09-01 PROBLEM — E78.2 MIXED HYPERLIPIDEMIA: Status: ACTIVE | Noted: 2023-09-01

## 2023-09-01 PROBLEM — J38.00 PARALYSIS OF VOCAL CORDS AND LARYNX, UNSPECIFIED: Status: ACTIVE | Noted: 2022-05-07

## 2023-09-01 PROBLEM — K21.9 GASTROESOPHAGEAL REFLUX DISEASE: Status: ACTIVE | Noted: 2023-09-01

## 2023-09-01 PROBLEM — I13.11: Status: ACTIVE | Noted: 2022-05-07

## 2023-09-01 PROBLEM — R10.9 FLANK PAIN: Status: ACTIVE | Noted: 2023-09-01

## 2023-09-01 PROBLEM — F10.11 HISTORY OF ALCOHOL ABUSE: Status: ACTIVE | Noted: 2023-09-01

## 2023-09-01 PROBLEM — J96.91 RESPIRATORY FAILURE, UNSPECIFIED WITH HYPOXIA (MULTI): Status: ACTIVE | Noted: 2022-05-07

## 2023-09-01 RX ORDER — HYDROXYZINE HYDROCHLORIDE 25 MG/1
1 TABLET, FILM COATED ORAL EVERY 6 HOURS PRN
COMMUNITY
Start: 2022-05-12 | End: 2024-02-09 | Stop reason: WASHOUT

## 2023-09-28 ENCOUNTER — HOSPITAL ENCOUNTER (OUTPATIENT)
Dept: DATA CONVERSION | Facility: HOSPITAL | Age: 73
End: 2023-09-28
Attending: OPHTHALMOLOGY | Admitting: OPHTHALMOLOGY
Payer: COMMERCIAL

## 2023-09-28 DIAGNOSIS — H25.812 COMBINED FORMS OF AGE-RELATED CATARACT, LEFT EYE: ICD-10-CM

## 2023-09-29 VITALS
TEMPERATURE: 96.8 F | DIASTOLIC BLOOD PRESSURE: 64 MMHG | HEART RATE: 57 BPM | RESPIRATION RATE: 16 BRPM | SYSTOLIC BLOOD PRESSURE: 119 MMHG

## 2023-09-30 NOTE — H&P
History of Present Illness:   History Present Illness:  Reason for surgery: cataract left eye   HPI:    Patient w/hx of cataract left eye presenting for cataract extraction with intraocular lens implantation of the left eye. Feels well, no health changes since prior  physician exam.     Allergies:        Allergies:  ·  azithromycin : Rash    Home Medication Review:   Home Medications Reviewed: yes     Impression/Procedure:   ·  Impression and Planned Procedure: cataract left eye presenting for cataract extraction with intraocular lens implantation of the left eye.       ERAS (Enhanced Recovery After Surgery):  ·  ERAS Patient: no       Vital Signs:  Temperature C: 36 degrees C   Temperature F: 96.8 degrees F   Heart Rate: 57 beats per minute   Respiratory Rate: 16 breath per minute   Blood Pressure Systolic: 119 mm/Hg   Blood Pressure Diastolic: 64 mm/Hg     Physical Exam by System:    Constitutional: Well developed, awake/alert, no distress,  alert and cooperative   Eyes: Clear sclera   ENMT: mucous membranes moist, no apparent injury,  further exam per anesthesia   Respiratory/Thorax: Unlabored respirations, symmetric  chest expansion without stridor, auscultation per anesthesia   Cardiovascular: Auscultation per anesthesia   Gastrointestinal: abdomen nondistended   Extremities: no cyanosis, contusions or wounds   Neurological: alert and oriented x3   Psychological: Appropriate mood and behavior   Skin: Warm and dry     Consent:   COVID-19 Consent:  ·  COVID-19 Risk Consent Surgeon has reviewed key risks related to the risk of christian COVID-19 and if they contract COVID-19 what the risks are.     Attestation:   Note Completion:  I am a:  Resident/Fellow   Attending Attestation I saw and evaluated the patient.  I personally obtained the key and critical portions of the history and physical exam or was physically present for key and  critical portions performed by the resident/fellow. I reviewed the  resident/fellow?s documentation and discussed the patient with the resident/fellow.  I agree with the resident/fellow?s medical decision making as documented in the note.     I personally evaluated the patient on 28-Sep-2023         Electronic Signatures:  Aaron Benjamin (Resident))  (Signed 28-Sep-2023 09:13)   Authored: History of Present Illness, Allergies, Home  Medication Review, Impression/Procedure, ERAS, Physical Exam, Consent, Note Completion  Laury Rubi)  (Signed 28-Sep-2023 09:15)   Authored: Note Completion   Co-Signer: History of Present Illness, Allergies, Home Medication Review, Impression/Procedure, ERAS, Physical Exam, Consent, Note Completion      Last Updated: 28-Sep-2023 09:15 by Laury Rubi)

## 2023-10-01 ASSESSMENT — CUP TO DISC RATIO
OD_RATIO: 0.3
OS_RATIO: 0.3

## 2023-10-01 ASSESSMENT — EXTERNAL EXAM - RIGHT EYE: OD_EXAM: NORMAL

## 2023-10-01 ASSESSMENT — SLIT LAMP EXAM - LIDS
COMMENTS: GOOD POSITION
COMMENTS: GOOD POSITION

## 2023-10-01 ASSESSMENT — EXTERNAL EXAM - LEFT EYE: OS_EXAM: NORMAL

## 2023-10-01 NOTE — OP NOTE
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   YOB: 1950  MRN: [id number]  Date of surgery: 09/28/2023  Preoperative diagnosis: Combined cataract of the LEFT eye  Postoperative diagnosis: Combined cataract of the LEFT eye  Procedure: Phacoemulsification of cataract with insertion of intraocular lens, LEFT eye  Surgeon: Laury Rubi MD  Resident: Aaron Benjamin MD  Anesthesia: MAC  Complications: None  Lens Inserted: Curtis ACU0T0 with a power of +18.00 D, serial # [id number]. Exp: 03/23/2025.     Procedure description: After the risks, benefits, and alternatives of the planned procedure were discussed with the patient, informed consent was obtained at the preoperative evaluation. On the day of surgery, there were no updates to the consent form  and any patient questions were answered. The patient was correctly identified in the preoperative area and the LEFT eye was marked as the operative eye. Dilating drops were instilled into the LEFT eye in the preoperative area. The patient was then taken  back to the operating room and placed under sedation. The patient was prepped and draped in the standard, sterile ophthalmic fashion in preparation for intraocular surgery.    A lid speculum was placed into the LEFT palpebral fissure and the operating microscope was brought into position. A paracentesis was made in inferotemporal cornea at the limbus  with a 1.0mm side port blade using a cotton tipped applicator to provide countertraction.  Intracameral epishugarcaine was injected into the anterior chamber followed by Viscoat viscoelastic. Using 0.12 forceps to stabilize the globe, a 2.4mm keratome blade was used to create a limbal clear-corneal incision superotemporally. A bent-needle cystotome  and Utrata forceps were used to create a continuous curvilinear capsulorrhexis. Balanced salt saline solution on a blunt-tipped cannula was used to achieve hydrodissection.    A phacoemulsification device and a Gillett spatula were used to remove the nucleus using a divide-and-conquer technique. Residual cortical material was removed with the irrigation and aspiration handpiece. Provisc viscoelastic was then injected into  the eye to reform the anterior chamber and to open the capsular bag. The posterior capsule was inspected and found to be clean and intact. The intraocular lens, an Curtis ACU0T0 with a power of +18.00 diopters was injected into the capsular bag. A lens  positioner was used to center the lens and ensure good position within the bag. The remaining viscoelastic was removed using irrigation and aspiration. Balanced salt saline solution on a blunt-tipped cannula was then used to hydrate the corneal stroma  adjacent to the main wound and paracentesis site as well as to reform the anterior chamber. The temporal main incision was then closed with a single 10-0 vicryl suture in an interrupted fashion. The wound was checked and found to be watertight with normal  intraocular pressure verified using digital palpation. At the conclusion of the case, a well-centered intraocular lens with a good red reflex was observed. Tetracaine, betadine, BSS, and prednisolone acetate drops were instilled into the LEFT eye. The  lid speculum and drapes were removed. . A clear plastic shield was then taped over the eye. The patient was taken to the recovery room in stable  condition, having tolerated the procedure well. There were no complications.      Attestation:   Note Completion:  Attending Attestation I was present for the entire procedure   Comments/ Additional Findings    I performed the procedure with resident assistance        Electronic Signatures:  Laury Rubi)  (Signed 28-Sep-2023 17:49)   Authored: Post Operative Note, Note Completion      Last Updated: 28-Sep-2023 17:49 by Laury Rubi)

## 2023-10-02 NOTE — PROGRESS NOTES
Combined form of age-related cataract, right eyeH25.811  -Visually significant cataract OD. BCVA: 20/50. Symptoms: Gradual decrease in vision x few months. Harder to read small print. More difficulty seeing small words/numbers on TV. Having more trouble seeing road signs when driving. Glare from headlights when driving at night. Can`t recognize faces if backlit. Does not drive anymore. A change in glasses prescription will not result in significant visual improvement at this time.  Indication/anticipated outcome for cataract surgery: To potentially improve visual acuity and improve quality of life/reduce symptoms. To obtain a better view of the retina/optic nerve. To reduce anisometropia.  Based on a comprehensive eye exam performed 10/3/23, a visually significant cataract appears to be the source of decreased vision, diminished quality of life, and impairment of activities of daily living. Discussed option of cataract surgery vs observation. Patient can no longer function adequately with current best corrected visual acuity and wishes to have cataract surgery at this time. Discussed surgical procedure with patient. Discussed potential risks, benefits, and complications of cataract surgery including but not limited to pain, bleeding, infection, inflammation, edema, increased eye pressure, retinal tear/detachment, lens dislocation, ptosis, iris damage, need for additional surgery, need for glasses after surgery, loss of vision/loss of eye. Patient understands and wishes to proceed. All questions were answered. Will schedule cataract surgery OD. Lenstar done 08/29/23 and 10/3/23.    -Pentacam (8/29/23) - OD: Irregular. 42.1/42.8 @ 112.6. OS: Irregular. 42.1/42.3 @ 78.6.   Discussed IOL options (standard monofocal, toric, multifocal). Lens chosen: Standard monofocal. Defer/decline toric/multifocal lens at this time.  Aim: -2.00 OD. Had thorough discussion with patient re: aim. Discussed that may potentially need  glasses for best vision both at distance and at near. Patient does not typically wear glasses for reading. Discussed distance aim OU with reading glasses for best vision at near (explained will be unable to see clearly at near without reading glasses) vs retain myopic aim OU to allow for good uncorrected near/reading vision and will need to wear glasses for distance/driving. After discussion, patient elects to retain myopic aim OU. Patient understands they will still need glasses at distance for best vision.   Dominance: Right  Special considerations: May use epishugarcaine and possible Malyugin Ring/iris hooks if poor dilation on day of surgery. Patient with COPD and cough. Will plan to place 10-0 vicryl suture. On home O2. Guarded visual prognosis due to diabetic retinopathy. No R.   Best contact number: [phone]  Drops:   -Ketorolac and Ofloxacin 4x/day starting 1 day prior to surgery; Prednisolone acetate 1% 4x/day starting after surgery    Pseudophakia of left eyeZ96.1 - 9/28/23  -Doing well. Good vision. IOP good. Suture removed.   Prednisolone acetate 1% - 4/3/2/1 weekly taper  Ofloxacin - 4 times a day x 1 more week, then stop  Ketorolac - 4 times a day x 1 more week, then stop  No heavy lifting over 15-20 lbs, do not get eye wet, no eye rubbing. Discontinue eye shield at bedtime but wear sunglasses/glasses during the day. Advised to call if any redness, pain, decreased vision, flashes, floaters.     Mild nonproliferative diabetic retinopathy of both eyes without macular [id number]  Diabetes wlmdzguaJ89.9  -OCT macula (8/29/23) - Normal thickness and contour OU. Intact IS-OS OU. No edema OU. 272/271.   -HbA1c= 5.3 (5/2/23). Mild nonproliferative diabetic retinopathy seen on exam. Continue close monitoring of blood glucose, blood pressure, and cholesterol. Plan for annual dilated eye exam.    Glaucoma suspect, both eyesH40.003  -OCT RNFL (6/18/21) - OD: Thin S. OS: Raymond Ortiz (algorithm  failure)/84.  -Will plan for OCT RNFL, HVF 24-2, pachymetry following healing from cataract surgery. Discussed risk of vision loss with diagnosis of glaucoma.     Retraction of lower afxxklE21.539  -No lagophthalmos, not bothersome to patient. Monitor for now and can consider referral to oculoplastic surgeon as needed.     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     WsfyqbR37.10  TqldxscuxaN49.4  -Defer new Rx. No significant improvement in vision with refraction at this time.       No history of intraocular surgery/refractive surgery.   No FH of AMD/glaucoma

## 2023-10-03 ENCOUNTER — OFFICE VISIT (OUTPATIENT)
Dept: OPHTHALMOLOGY | Facility: CLINIC | Age: 73
End: 2023-10-03
Payer: COMMERCIAL

## 2023-10-03 ENCOUNTER — PREP FOR PROCEDURE (OUTPATIENT)
Dept: OPHTHALMOLOGY | Facility: CLINIC | Age: 73
End: 2023-10-03

## 2023-10-03 DIAGNOSIS — H40.003 GLAUCOMA SUSPECT, BOTH EYES: ICD-10-CM

## 2023-10-03 DIAGNOSIS — H25.811 COMBINED FORM OF AGE-RELATED CATARACT, RIGHT EYE: ICD-10-CM

## 2023-10-03 DIAGNOSIS — H52.4 PRESBYOPIA: ICD-10-CM

## 2023-10-03 DIAGNOSIS — E11.3293 MILD NONPROLIFERATIVE DIABETIC RETINOPATHY OF BOTH EYES WITHOUT MACULAR EDEMA ASSOCIATED WITH TYPE 2 DIABETES MELLITUS (MULTI): ICD-10-CM

## 2023-10-03 DIAGNOSIS — H43.23 ASTEROID HYALOSIS OF BOTH EYES: ICD-10-CM

## 2023-10-03 DIAGNOSIS — Z96.1 PSEUDOPHAKIA: Primary | ICD-10-CM

## 2023-10-03 DIAGNOSIS — H02.539: ICD-10-CM

## 2023-10-03 DIAGNOSIS — H52.13 MYOPIA, BILATERAL: ICD-10-CM

## 2023-10-03 PROCEDURE — 3008F BODY MASS INDEX DOCD: CPT | Performed by: OPHTHALMOLOGY

## 2023-10-03 PROCEDURE — 3044F HG A1C LEVEL LT 7.0%: CPT | Performed by: OPHTHALMOLOGY

## 2023-10-03 PROCEDURE — 99024 POSTOP FOLLOW-UP VISIT: CPT | Performed by: OPHTHALMOLOGY

## 2023-10-03 PROCEDURE — 1160F RVW MEDS BY RX/DR IN RCRD: CPT | Performed by: OPHTHALMOLOGY

## 2023-10-03 PROCEDURE — 1159F MED LIST DOCD IN RCRD: CPT | Performed by: OPHTHALMOLOGY

## 2023-10-03 PROCEDURE — 1126F AMNT PAIN NOTED NONE PRSNT: CPT | Performed by: OPHTHALMOLOGY

## 2023-10-03 PROCEDURE — 92136 OPHTHALMIC BIOMETRY: CPT | Mod: RIGHT SIDE | Performed by: OPHTHALMOLOGY

## 2023-10-03 PROCEDURE — 4010F ACE/ARB THERAPY RXD/TAKEN: CPT | Performed by: OPHTHALMOLOGY

## 2023-10-03 RX ORDER — TETRACAINE HYDROCHLORIDE 5 MG/ML
1 SOLUTION OPHTHALMIC ONCE
Status: CANCELLED | OUTPATIENT
Start: 2023-12-07

## 2023-10-03 RX ORDER — KETOROLAC TROMETHAMINE 5 MG/ML
SOLUTION OPHTHALMIC
Status: ON HOLD | COMMUNITY
Start: 2023-09-13 | End: 2023-11-09 | Stop reason: SDUPTHER

## 2023-10-03 RX ORDER — CYCLOPENTOLATE HYDROCHLORIDE 10 MG/ML
1 SOLUTION/ DROPS OPHTHALMIC ONCE
Status: CANCELLED | OUTPATIENT
Start: 2023-12-07

## 2023-10-03 RX ORDER — CYCLOPENTOLATE HYDROCHLORIDE 10 MG/ML
1 SOLUTION/ DROPS OPHTHALMIC ONCE
Status: CANCELLED | OUTPATIENT
Start: 2023-10-03 | End: 2023-10-03

## 2023-10-03 RX ORDER — TETRACAINE HYDROCHLORIDE 5 MG/ML
1 SOLUTION OPHTHALMIC ONCE
Status: CANCELLED | OUTPATIENT
Start: 2023-10-03 | End: 2023-10-03

## 2023-10-03 RX ORDER — OFLOXACIN 3 MG/ML
SOLUTION/ DROPS OPHTHALMIC 4 TIMES DAILY
Status: ON HOLD | COMMUNITY
Start: 2023-09-13 | End: 2023-11-09 | Stop reason: SDUPTHER

## 2023-10-03 RX ORDER — PREDNISOLONE ACETATE 10 MG/ML
SUSPENSION/ DROPS OPHTHALMIC 4 TIMES DAILY
Status: ON HOLD | COMMUNITY
Start: 2023-09-13 | End: 2023-11-09 | Stop reason: SDUPTHER

## 2023-10-03 ASSESSMENT — VISUAL ACUITY
METHOD: SNELLEN - LINEAR
CORRECTION_TYPE: GLASSES
OD_CC: 20/50
OS_SC: 20/50
OS_SC+: -1

## 2023-10-03 ASSESSMENT — REFRACTION_WEARINGRX
OD_ADD: +2.50
OD_AXIS: 065
OD_CYLINDER: -0.50
OD_SPHERE: -1.00

## 2023-10-03 ASSESSMENT — REFRACTION_MANIFEST
OS_CYLINDER: -2.00
OS_AXIS: 105
OS_ADD: +2.50
OD_ADD: +2.50
METHOD_AUTOREFRACTION: 1
OS_CYLINDER: -2.50
OS_SPHERE: -0.25
OS_AXIS: 105
OD_AXIS: 065
OD_SPHERE: NO TARGET
OS_SPHERE: -0.25
OD_SPHERE: -1.00
OD_CYLINDER: -0.50

## 2023-10-03 ASSESSMENT — ENCOUNTER SYMPTOMS
CARDIOVASCULAR NEGATIVE: 0
ALLERGIC/IMMUNOLOGIC NEGATIVE: 0
GASTROINTESTINAL NEGATIVE: 0
HEMATOLOGIC/LYMPHATIC NEGATIVE: 0
NEUROLOGICAL NEGATIVE: 0
CONSTITUTIONAL NEGATIVE: 0
MUSCULOSKELETAL NEGATIVE: 0
EYES NEGATIVE: 0
PSYCHIATRIC NEGATIVE: 0
RESPIRATORY NEGATIVE: 0
ENDOCRINE NEGATIVE: 0

## 2023-10-03 ASSESSMENT — TONOMETRY
IOP_METHOD: GOLDMANN APPLANATION
OS_IOP_MMHG: 12
OD_IOP_MMHG: 12

## 2023-10-05 ENCOUNTER — PATIENT OUTREACH (OUTPATIENT)
Dept: PRIMARY CARE | Facility: CLINIC | Age: 73
End: 2023-10-05
Payer: COMMERCIAL

## 2023-10-06 ENCOUNTER — APPOINTMENT (OUTPATIENT)
Dept: OPHTHALMOLOGY | Facility: CLINIC | Age: 73
End: 2023-10-06
Payer: COMMERCIAL

## 2023-10-08 ASSESSMENT — REFRACTION_WEARINGRX
OS_SPHERE: -0.75
OS_ADD: +2.50
OS_CYLINDER: -0.50
OS_AXIS: 180

## 2023-10-09 ENCOUNTER — OFFICE VISIT (OUTPATIENT)
Dept: NEPHROLOGY | Facility: CLINIC | Age: 73
End: 2023-10-09
Payer: COMMERCIAL

## 2023-10-09 VITALS
TEMPERATURE: 98.3 F | HEART RATE: 60 BPM | SYSTOLIC BLOOD PRESSURE: 123 MMHG | HEIGHT: 67 IN | WEIGHT: 125.6 LBS | BODY MASS INDEX: 19.71 KG/M2 | RESPIRATION RATE: 18 BRPM | DIASTOLIC BLOOD PRESSURE: 65 MMHG

## 2023-10-09 DIAGNOSIS — N18.4 STAGE 4 CHRONIC KIDNEY DISEASE (MULTI): Primary | ICD-10-CM

## 2023-10-09 PROCEDURE — 3074F SYST BP LT 130 MM HG: CPT | Performed by: INTERNAL MEDICINE

## 2023-10-09 PROCEDURE — 3008F BODY MASS INDEX DOCD: CPT | Performed by: INTERNAL MEDICINE

## 2023-10-09 PROCEDURE — 3078F DIAST BP <80 MM HG: CPT | Performed by: INTERNAL MEDICINE

## 2023-10-09 PROCEDURE — 4010F ACE/ARB THERAPY RXD/TAKEN: CPT | Performed by: INTERNAL MEDICINE

## 2023-10-09 PROCEDURE — 99214 OFFICE O/P EST MOD 30 MIN: CPT | Performed by: INTERNAL MEDICINE

## 2023-10-09 PROCEDURE — 1126F AMNT PAIN NOTED NONE PRSNT: CPT | Performed by: INTERNAL MEDICINE

## 2023-10-09 PROCEDURE — 1160F RVW MEDS BY RX/DR IN RCRD: CPT | Performed by: INTERNAL MEDICINE

## 2023-10-09 PROCEDURE — 3044F HG A1C LEVEL LT 7.0%: CPT | Performed by: INTERNAL MEDICINE

## 2023-10-09 PROCEDURE — 1159F MED LIST DOCD IN RCRD: CPT | Performed by: INTERNAL MEDICINE

## 2023-10-09 PROCEDURE — 3066F NEPHROPATHY DOC TX: CPT | Performed by: INTERNAL MEDICINE

## 2023-10-09 RX ORDER — DAPAGLIFLOZIN 10 MG/1
10 TABLET, FILM COATED ORAL
Qty: 30 TABLET | Refills: 0 | Status: SHIPPED | OUTPATIENT
Start: 2023-10-09 | End: 2023-12-04 | Stop reason: SDUPTHER

## 2023-10-09 NOTE — PROGRESS NOTES
"Subjective   Patient ID: Charly Cornejo is a 73 y.o. male who presents for Follow-up.  HPI  For follow up, doing well.  No complaints  No hospitalizations/illness since last visit  Not checking BP at home  Denies orthostatic symptoms    Review of Systems  RoS negative for all other systems except as noted above.    Vitals:    10/09/23 1056   BP: 123/65   BP Location: Right arm   Patient Position: Sitting   BP Cuff Size: Adult   Pulse: 60   Resp: 18   Temp: 36.8 °C (98.3 °F)   Weight: 57 kg (125 lb 9.6 oz)   Height: 1.702 m (5' 7\")      Objective   Physical Exam  No distress  HEENT:  moist, no pallor  No edema khurram LE  Breath sounds khurram equal, clear  S1 S2 regular, normal, no rub or murmur  Abdomen soft, non tender, no CVA tenderness  AAO x3, non focal       Current Outpatient Medications   Medication Sig Dispense Refill    Farxiga 10 mg Take 1 tablet (10 mg) by mouth once daily in the morning. Take before meals.      losartan (Cozaar) 50 mg tablet Take 1 tablet (50 mg) by mouth once daily.      metoprolol succinate XL (Toprol-XL) 25 mg 24 hr tablet 1 tablet EVERY DAY (route: oral)      acetaminophen (Tylenol) 325 mg tablet Take 2 tablets (650 mg) by mouth every 6 hours if needed.      albuterol 90 mcg/actuation inhaler Inhale.      allopurinol (Zyloprim) 100 mg tablet Take 1 tablet (100 mg) by mouth once daily.      amitriptyline (Elavil) 10 mg tablet       amLODIPine (Norvasc) 10 mg tablet Take 1 tablet (10 mg) by mouth once daily.      amLODIPine (Norvasc) 5 mg tablet       aspirin 81 mg EC tablet Take 1 tablet (81 mg) by mouth once daily.      atorvastatin (Lipitor) 20 mg tablet Take 1 tablet (20 mg) by mouth once daily.      benzonatate (Tessalon) 100 mg capsule Take 1 capsule (100 mg) by mouth 3 times a day as needed (3 times daily as needed).      budesonide-glycopyr-formoterol (Breztri Aerosphere) 160-9-4.8 mcg/actuation HFA aerosol inhaler Inhale 2 puffs 2 times a day.      calcitriol (Rocaltrol) 0.25 mcg " capsule Take 1 capsule (0.25 mcg) by mouth once daily.      calcium carbonate (Oscal) 500 mg calcium (1,250 mg) tablet Take 1 tablet (1,250 mg) by mouth.      cholecalciferol (Vitamin D-3) 125 MCG (5000 UT) capsule Take 1 capsule (125 mcg) by mouth once daily.      cholecalciferol (Vitamin D-3) 5,000 Units tablet Take by mouth.      colchicine 0.6 mg tablet Take ONE TABLET EVERY OTHER DAY for treatment OF pericardial effusion      docusate sodium (Colace) 100 mg capsule Take 1 capsule (100 mg) by mouth 2 times a day.      ergocalciferol (Vitamin D-2) 1.25 MG (88539 UT) capsule Take 1 capsule (50,000 Units) by mouth once a week.      ferrous gluconate 225 mg (27 mg iron) tablet Take 1 tablet by mouth once daily.      ferrous sulfate 325 (65 Fe) MG tablet Take 1 tablet (325 mg) by mouth.      finasteride (Proscar) 5 mg tablet Take 1 tablet (5 mg) by mouth once daily.      Fluarix Quad 4545-8062, PF, syringe       folic acid (Folvite) 1 mg tablet Take 1 tablet (1 mg) by mouth once daily.      gabapentin (Neurontin) 100 mg capsule TAKE 2 CAPSULES 3 TIMES A   capsule 0    hydrOXYzine HCL (Atarax) 25 mg tablet Take 1 tablet (25 mg) by mouth every 6 hours if needed (every 6 hours as needed).      ipratropium-albuteroL (Duo-Neb) 0.5-2.5 mg/3 mL nebulizer solution Inhale 4 times a day.      ketoconazole (NIZOral) 2 % cream Apply 1 Application topically once daily.      ketorolac (Acular) 0.5 % ophthalmic solution Administer into affected eye(s).      lidocaine (Lidoderm) 5 % patch Place 1 patch on the skin once daily.      metaxalone (Skelaxin) 800 mg tablet Take 1 tablet (800 mg) by mouth every 8 hours if needed.      metoprolol succinate XL (Toprol-XL) 25 mg 24 hr tablet Take 1 tablet (25 mg) by mouth once daily.      multivitamin with iron (pediatric multivitamin-iron) tablet chewable split tablet Take 1 half tablet by mouth once daily.      multivitamin with iron (pediatric multivitamin-iron) tablet chewable split  tablet Take 1 half tablet by mouth once daily.      ofloxacin (Ocuflox) 0.3 % ophthalmic solution Administer into affected eye(s) 4 times a day.      omeprazole (PriLOSEC) 20 mg DR capsule Take 1 capsule (20 mg) by mouth.      oxygen (O2) therapy 4 Liter O2 - CONTINUOUS (route: Oxygen)      polyethylene glycol (Glycolax, Miralax) 17 gram packet Take by mouth.      potassium chloride (Klor-Con) 20 mEq packet Take by mouth.      prednisoLONE acetate (Pred-Forte) 1 % ophthalmic suspension Administer into affected eye(s) 4 times a day.      predniSONE (Deltasone) 10 mg tablet Per instructions once a day x 3 days once a day x 3 days once a day x 3 days once a day x 3 days (route: oral)      rosuvastatin (Crestor) 20 mg tablet Take 1 tablet (20 mg) by mouth once daily at bedtime.      sodium bicarbonate 650 mg tablet Take 1 tablet (650 mg) by mouth.      tadalafil (Cialis) 5 mg tablet Take 1 tablet (5 mg) by mouth once daily. 30 tablet 0    tamsulosin (Flomax) 0.4 mg 24 hr capsule Take 1 capsule (0.4 mg) by mouth once daily.       No current facility-administered medications for this visit.       Assessment/Plan        72-year-old with history of solitary kidney, hypertension, nephrolithiasis, prior episodes of acute kidney injury now with chronic kidney disease here for follow-up for the first time since February 2022     #Chronic kidney disease stage G3 B A1: Creatinine on 8/21/2023 was 2.2 mg per DL, EGFR 30 mill per minute, stable. Tolerating Farxiga well.      #Bone mineral metabolism: Calcium 10, albumin 4.4, phosphorus 3.9, PTH 63, sr bicarb 28, monitor, ct vit D 5000 U daily     #Hypertension: Goal blood pressure less than 120/80, at goal. Continue losartan 50 mg once a day and metoprolol 25 mg once a day.   RTC: 3-4 mo

## 2023-10-11 DIAGNOSIS — G62.9 NEUROPATHY: ICD-10-CM

## 2023-10-12 RX ORDER — GABAPENTIN 100 MG/1
200 CAPSULE ORAL 3 TIMES DAILY
Qty: 540 CAPSULE | Refills: 0 | Status: SHIPPED | OUTPATIENT
Start: 2023-10-12 | End: 2023-12-26

## 2023-10-12 NOTE — TELEPHONE ENCOUNTER
Requesting refill of gabapentin  [place] reviewed 10/12/2023  I have personally reviewed the [place]AR[place] report and I have considered the risk of abuse, dependence, addiction, and diversion. I believe it is clinically appropriate for this patient to be prescribed this medication based on documented diagnosis  Last fill 7/31/23 x 90 days

## 2023-10-23 ENCOUNTER — APPOINTMENT (OUTPATIENT)
Dept: PRIMARY CARE | Facility: CLINIC | Age: 73
End: 2023-10-23
Payer: COMMERCIAL

## 2023-11-01 DIAGNOSIS — N40.0 BENIGN PROSTATIC HYPERPLASIA, UNSPECIFIED WHETHER LOWER URINARY TRACT SYMPTOMS PRESENT: Primary | ICD-10-CM

## 2023-11-02 RX ORDER — FINASTERIDE 5 MG/1
5 TABLET, FILM COATED ORAL DAILY
Qty: 90 TABLET | Refills: 0 | Status: SHIPPED | OUTPATIENT
Start: 2023-11-02 | End: 2024-01-16

## 2023-11-02 RX ORDER — TAMSULOSIN HYDROCHLORIDE 0.4 MG/1
0.4 CAPSULE ORAL DAILY
Qty: 90 CAPSULE | Refills: 0 | Status: SHIPPED | OUTPATIENT
Start: 2023-11-02 | End: 2024-01-16

## 2023-11-06 NOTE — PROGRESS NOTES
"Subjective   Charly Cornejo is a 73 y.o. male who presents for Follow-up. Generally doing well. Currently c/o:    LUQ abdominal pain: occurring intermittently for the past few days. Spasm style \"grabbing\" pain that only occurs with certain movements.  Otherwise he is asymptomatic. No pain with palpation to the area. States when at home he never gets the pain when sitting in his recliner, but if he moves to a different chair he may feel it. Pain is relieved by standing and stretching. He believes it is a muscle spasm.     Bowel incontinence/urgency: occurring during sleep 2-5 times in the last 2 months. Takes immodium to relieve symptoms.     HTN: taking amlodipine 10mg, losartan 50mg, metoprolol succinate 50mg. Does not check BP at home. Denies any headache, dizziness, chest pain, palpitations, LE edema.  Follows with cardiology    Hypercholesterolemia taking 81mg ASA and rosuvastatin 20mg     CKD, stage IV: stable, follows with nephrology.  Does his best to drink adequate water. On losartan 50mg and farxiga 10mg.      Gout: no recent flares. Last uric acid level was 8.4. On allopurinol 100mg daily. Discussed with him possibly decreasing dose of allopurinol d/t CKD, he prefers to wait and discuss with nephrology      COPD, chronic respiratory failure, nicotine dependence: Stable.  On 4.5 L/min of supplemental O2. Uses breztri inhaler for maintenance and albuterol inhaler PRN.  Completed CT chest 8/21/23.  Follows with pulmonary medicine     Renal cell carcinoma: s/p L nephrectomy for pT3a papillary RCC. Follows with urology. MRI kidney (9/2/22) with stable appearance of left nephrectomy site, without evidence of recurrent or metastatic disease.     Cataracts: s/p cataract extraction and intraocular lens insertion of L eye. Scheduled for R eye surgery in 2 days. Notes his vision has improved drastically on the L since surgery and he is very pleased with results.     Health maintenance:  Immunizations:  -Flu: obtain " high-dose today, 11/12/23  -RSV: recommended from pharmacy  -Pneumococcal: obtain [id number] today, 11/12/23   -Shingrix: Overdue, deferred to next appt  -Tdap: recommended every 10 years, obtain from local pharmacy  PSA UTD (last 1/4/23) - no future screening d/t age  Colon CA: screening UTD (last 6/27/22)     Last MCR / CPE: 1/4/23 (MCR ADV)    12 point ROS reviewed and negative other than as stated in HPI    /67   Pulse 67   Wt 54.9 kg (121 lb)   SpO2 99%   BMI 18.95 kg/m²   Objective   Physical Exam  Vitals reviewed.   Constitutional:       General: He is not in acute distress.     Appearance: Normal appearance. He is not ill-appearing.   HENT:      Head: Normocephalic and atraumatic.   Eyes:      General: No scleral icterus.     Extraocular Movements: Extraocular movements intact.      Conjunctiva/sclera: Conjunctivae normal.      Pupils: Pupils are equal, round, and reactive to light.      Comments: Wears glasses   Cardiovascular:      Rate and Rhythm: Normal rate and regular rhythm.      Heart sounds: Normal heart sounds. No murmur heard.     No friction rub. No gallop.   Pulmonary:      Effort: Pulmonary effort is normal. No respiratory distress.      Breath sounds: Normal breath sounds. No stridor. No wheezing, rhonchi or rales.      Comments: On 4.5 L/min supplemental O2.  Musculoskeletal:      Cervical back: Normal range of motion.      Right lower leg: No edema.      Left lower leg: No edema.   Skin:     General: Skin is warm and dry.   Neurological:      Mental Status: He is alert and oriented to person, place, and time. Mental status is at baseline.      Cranial Nerves: No cranial nerve deficit.      Gait: Gait normal.   Psychiatric:         Mood and Affect: Mood normal.         Behavior: Behavior normal.         Assessment/Plan   Problem List Items Addressed This Visit       Benign essential hypertension - Primary     Well-controlled.  Continue amlodipine 10mg, losartan 50mg, and metoprolol  succinate 50mg regimen.  Follow strict DASH diet and exercise as tolerated         Chronic gout     Continue allopurinol 100mg daily, strongly encouraged patient to discuss with nephrologist decreasing dose d/t CKD. Follow a low purine diet. Uric acid level ordered         Relevant Orders    Uric acid    Hypercholesteremia     Stable.  Continue rosuvastatin 20 mg and 81mg ASA.  Follow a Mediterranean diet and exercise as tolerated         Loose stools     Establish with a new GI provider. Use Immodium PRN.          Peripheral vascular disease (CMS/HCC)     Continue 81mg ASA and rosuvastatin 20mg. Encouraged smoking cessation and regular exercise         Muscle spasm     Apply a heating pad and topical lidocaine patches. Take tylenol PRN.                Follow up in 6 months for MCR wellness or sooner as needed

## 2023-11-07 ENCOUNTER — OFFICE VISIT (OUTPATIENT)
Dept: PRIMARY CARE | Facility: CLINIC | Age: 73
End: 2023-11-07
Payer: COMMERCIAL

## 2023-11-07 VITALS
BODY MASS INDEX: 18.95 KG/M2 | SYSTOLIC BLOOD PRESSURE: 117 MMHG | WEIGHT: 121 LBS | DIASTOLIC BLOOD PRESSURE: 67 MMHG | OXYGEN SATURATION: 99 % | HEART RATE: 67 BPM

## 2023-11-07 DIAGNOSIS — M62.838 MUSCLE SPASM: ICD-10-CM

## 2023-11-07 DIAGNOSIS — R19.5 LOOSE STOOLS: ICD-10-CM

## 2023-11-07 DIAGNOSIS — M1A.9XX0 CHRONIC GOUT WITHOUT TOPHUS, UNSPECIFIED CAUSE, UNSPECIFIED SITE: ICD-10-CM

## 2023-11-07 DIAGNOSIS — E78.00 HYPERCHOLESTEREMIA: ICD-10-CM

## 2023-11-07 DIAGNOSIS — I73.9 PERIPHERAL VASCULAR DISEASE (CMS-HCC): ICD-10-CM

## 2023-11-07 DIAGNOSIS — I10 BENIGN ESSENTIAL HYPERTENSION: Primary | ICD-10-CM

## 2023-11-07 PROCEDURE — 1160F RVW MEDS BY RX/DR IN RCRD: CPT | Performed by: PHYSICIAN ASSISTANT

## 2023-11-07 PROCEDURE — 3074F SYST BP LT 130 MM HG: CPT | Performed by: PHYSICIAN ASSISTANT

## 2023-11-07 PROCEDURE — 3078F DIAST BP <80 MM HG: CPT | Performed by: PHYSICIAN ASSISTANT

## 2023-11-07 PROCEDURE — 3066F NEPHROPATHY DOC TX: CPT | Performed by: PHYSICIAN ASSISTANT

## 2023-11-07 PROCEDURE — G0008 ADMIN INFLUENZA VIRUS VAC: HCPCS | Performed by: PHYSICIAN ASSISTANT

## 2023-11-07 PROCEDURE — 99214 OFFICE O/P EST MOD 30 MIN: CPT | Performed by: PHYSICIAN ASSISTANT

## 2023-11-07 PROCEDURE — 1159F MED LIST DOCD IN RCRD: CPT | Performed by: PHYSICIAN ASSISTANT

## 2023-11-07 PROCEDURE — 3008F BODY MASS INDEX DOCD: CPT | Performed by: PHYSICIAN ASSISTANT

## 2023-11-07 PROCEDURE — G0009 ADMIN PNEUMOCOCCAL VACCINE: HCPCS | Performed by: PHYSICIAN ASSISTANT

## 2023-11-07 PROCEDURE — 90677 PCV20 VACCINE IM: CPT | Performed by: PHYSICIAN ASSISTANT

## 2023-11-07 PROCEDURE — 3044F HG A1C LEVEL LT 7.0%: CPT | Performed by: PHYSICIAN ASSISTANT

## 2023-11-07 PROCEDURE — 4010F ACE/ARB THERAPY RXD/TAKEN: CPT | Performed by: PHYSICIAN ASSISTANT

## 2023-11-07 PROCEDURE — 1126F AMNT PAIN NOTED NONE PRSNT: CPT | Performed by: PHYSICIAN ASSISTANT

## 2023-11-07 PROCEDURE — 90662 IIV NO PRSV INCREASED AG IM: CPT | Performed by: PHYSICIAN ASSISTANT

## 2023-11-08 ENCOUNTER — ANESTHESIA EVENT (OUTPATIENT)
Dept: OPERATING ROOM | Facility: CLINIC | Age: 73
End: 2023-11-08
Payer: COMMERCIAL

## 2023-11-09 ENCOUNTER — ANESTHESIA (OUTPATIENT)
Dept: OPERATING ROOM | Facility: CLINIC | Age: 73
End: 2023-11-09
Payer: COMMERCIAL

## 2023-11-09 ENCOUNTER — HOSPITAL ENCOUNTER (OUTPATIENT)
Facility: CLINIC | Age: 73
Setting detail: OUTPATIENT SURGERY
Discharge: HOME | End: 2023-11-09
Attending: OPHTHALMOLOGY | Admitting: OPHTHALMOLOGY
Payer: COMMERCIAL

## 2023-11-09 VITALS
DIASTOLIC BLOOD PRESSURE: 68 MMHG | OXYGEN SATURATION: 96 % | SYSTOLIC BLOOD PRESSURE: 115 MMHG | HEART RATE: 67 BPM | HEIGHT: 67 IN | BODY MASS INDEX: 19.31 KG/M2 | WEIGHT: 123.02 LBS | TEMPERATURE: 98.1 F | RESPIRATION RATE: 16 BRPM

## 2023-11-09 DIAGNOSIS — H25.811 COMBINED FORM OF AGE-RELATED CATARACT, RIGHT EYE: Primary | ICD-10-CM

## 2023-11-09 DIAGNOSIS — H25.811 COMBINED FORM OF AGE-RELATED CATARACT, RIGHT EYE: ICD-10-CM

## 2023-11-09 DIAGNOSIS — Z96.1 PSEUDOPHAKIA: Primary | ICD-10-CM

## 2023-11-09 LAB — GLUCOSE BLD MANUAL STRIP-MCNC: 81 MG/DL (ref 74–99)

## 2023-11-09 PROCEDURE — 3600000008 HC OR TIME - EACH INCREMENTAL 1 MINUTE - PROCEDURE LEVEL THREE: Performed by: OPHTHALMOLOGY

## 2023-11-09 PROCEDURE — 2500000004 HC RX 250 GENERAL PHARMACY W/ HCPCS (ALT 636 FOR OP/ED): Performed by: NURSE ANESTHETIST, CERTIFIED REGISTERED

## 2023-11-09 PROCEDURE — 66984 XCAPSL CTRC RMVL W/O ECP: CPT | Performed by: OPHTHALMOLOGY

## 2023-11-09 PROCEDURE — 3700000002 HC GENERAL ANESTHESIA TIME - EACH INCREMENTAL 1 MINUTE: Performed by: OPHTHALMOLOGY

## 2023-11-09 PROCEDURE — 82947 ASSAY GLUCOSE BLOOD QUANT: CPT

## 2023-11-09 PROCEDURE — 2500000004 HC RX 250 GENERAL PHARMACY W/ HCPCS (ALT 636 FOR OP/ED): Performed by: OPHTHALMOLOGY

## 2023-11-09 PROCEDURE — A66984 PR REMV CATARACT EXTRACAP,INSERT LENS: Performed by: NURSE ANESTHETIST, CERTIFIED REGISTERED

## 2023-11-09 PROCEDURE — 99100 ANES PT EXTEME AGE<1 YR&>70: CPT | Performed by: STUDENT IN AN ORGANIZED HEALTH CARE EDUCATION/TRAINING PROGRAM

## 2023-11-09 PROCEDURE — 3600000003 HC OR TIME - INITIAL BASE CHARGE - PROCEDURE LEVEL THREE: Performed by: OPHTHALMOLOGY

## 2023-11-09 PROCEDURE — 2500000001 HC RX 250 WO HCPCS SELF ADMINISTERED DRUGS (ALT 637 FOR MEDICARE OP): Performed by: OPHTHALMOLOGY

## 2023-11-09 PROCEDURE — 2500000001 HC RX 250 WO HCPCS SELF ADMINISTERED DRUGS (ALT 637 FOR MEDICARE OP): Performed by: STUDENT IN AN ORGANIZED HEALTH CARE EDUCATION/TRAINING PROGRAM

## 2023-11-09 PROCEDURE — A66984 PR REMV CATARACT EXTRACAP,INSERT LENS: Performed by: STUDENT IN AN ORGANIZED HEALTH CARE EDUCATION/TRAINING PROGRAM

## 2023-11-09 PROCEDURE — 3700000001 HC GENERAL ANESTHESIA TIME - INITIAL BASE CHARGE: Performed by: OPHTHALMOLOGY

## 2023-11-09 PROCEDURE — A4217 STERILE WATER/SALINE, 500 ML: HCPCS | Performed by: OPHTHALMOLOGY

## 2023-11-09 PROCEDURE — 2500000005 HC RX 250 GENERAL PHARMACY W/O HCPCS: Performed by: OPHTHALMOLOGY

## 2023-11-09 PROCEDURE — 7100000010 HC PHASE TWO TIME - EACH INCREMENTAL 1 MINUTE: Performed by: OPHTHALMOLOGY

## 2023-11-09 PROCEDURE — 7100000009 HC PHASE TWO TIME - INITIAL BASE CHARGE: Performed by: OPHTHALMOLOGY

## 2023-11-09 PROCEDURE — 2760000001 HC OR 276 NO HCPCS: Performed by: OPHTHALMOLOGY

## 2023-11-09 DEVICE — STERILE UV AND BLUE LIGHT FILTERING ACRYLIC FOLDABLE SINGLE-PIECE POSTERIOR CHAMBER LENSES WITH THE ULTRASERT® PRE-LOADED DELIVERY SYSTEM
Type: IMPLANTABLE DEVICE | Site: EYE | Status: FUNCTIONAL
Brand: ACRYSOF® ULTRASERT®

## 2023-11-09 RX ORDER — POVIDONE-IODINE 5 %
SOLUTION, NON-ORAL OPHTHALMIC (EYE) AS NEEDED
Status: DISCONTINUED | OUTPATIENT
Start: 2023-11-09 | End: 2023-11-09 | Stop reason: HOSPADM

## 2023-11-09 RX ORDER — OFLOXACIN 3 MG/ML
SOLUTION/ DROPS OPHTHALMIC
Qty: 5 ML | Refills: 2 | Status: SHIPPED | OUTPATIENT
Start: 2023-11-09 | End: 2024-01-12 | Stop reason: WASHOUT

## 2023-11-09 RX ORDER — TETRACAINE HYDROCHLORIDE 5 MG/ML
SOLUTION OPHTHALMIC AS NEEDED
Status: DISCONTINUED | OUTPATIENT
Start: 2023-11-09 | End: 2023-11-09 | Stop reason: HOSPADM

## 2023-11-09 RX ORDER — ALBUTEROL SULFATE 0.83 MG/ML
2.5 SOLUTION RESPIRATORY (INHALATION) ONCE AS NEEDED
Status: DISCONTINUED | OUTPATIENT
Start: 2023-11-09 | End: 2023-11-09 | Stop reason: HOSPADM

## 2023-11-09 RX ORDER — LABETALOL HYDROCHLORIDE 5 MG/ML
5 INJECTION, SOLUTION INTRAVENOUS ONCE AS NEEDED
Status: DISCONTINUED | OUTPATIENT
Start: 2023-11-09 | End: 2023-11-09 | Stop reason: HOSPADM

## 2023-11-09 RX ORDER — ONDANSETRON HYDROCHLORIDE 2 MG/ML
4 INJECTION, SOLUTION INTRAVENOUS ONCE AS NEEDED
Status: DISCONTINUED | OUTPATIENT
Start: 2023-11-09 | End: 2023-11-09 | Stop reason: HOSPADM

## 2023-11-09 RX ORDER — FENTANYL CITRATE 50 UG/ML
25 INJECTION, SOLUTION INTRAMUSCULAR; INTRAVENOUS EVERY 5 MIN PRN
Status: DISCONTINUED | OUTPATIENT
Start: 2023-11-09 | End: 2023-11-09 | Stop reason: HOSPADM

## 2023-11-09 RX ORDER — KETOROLAC TROMETHAMINE 5 MG/ML
SOLUTION OPHTHALMIC
Qty: 5 ML | Refills: 2 | Status: SHIPPED | OUTPATIENT
Start: 2023-11-09 | End: 2024-01-12 | Stop reason: WASHOUT

## 2023-11-09 RX ORDER — FENTANYL CITRATE 50 UG/ML
INJECTION, SOLUTION INTRAMUSCULAR; INTRAVENOUS AS NEEDED
Status: DISCONTINUED | OUTPATIENT
Start: 2023-11-09 | End: 2023-11-09

## 2023-11-09 RX ORDER — TROPICAMIDE 10 MG/ML
1 SOLUTION/ DROPS OPHTHALMIC
Status: COMPLETED | OUTPATIENT
Start: 2023-11-09 | End: 2023-11-09

## 2023-11-09 RX ORDER — MIDAZOLAM HYDROCHLORIDE 1 MG/ML
INJECTION, SOLUTION INTRAMUSCULAR; INTRAVENOUS AS NEEDED
Status: DISCONTINUED | OUTPATIENT
Start: 2023-11-09 | End: 2023-11-09

## 2023-11-09 RX ORDER — TETRACAINE HYDROCHLORIDE 5 MG/ML
1 SOLUTION OPHTHALMIC ONCE
Status: COMPLETED | OUTPATIENT
Start: 2023-11-09 | End: 2023-11-09

## 2023-11-09 RX ORDER — CYCLOPENTOLATE HYDROCHLORIDE 10 MG/ML
1 SOLUTION/ DROPS OPHTHALMIC ONCE
Status: COMPLETED | OUTPATIENT
Start: 2023-11-09 | End: 2023-11-09

## 2023-11-09 RX ORDER — WATER 1 ML/ML
IRRIGANT IRRIGATION AS NEEDED
Status: DISCONTINUED | OUTPATIENT
Start: 2023-11-09 | End: 2023-11-09 | Stop reason: HOSPADM

## 2023-11-09 RX ORDER — FENTANYL CITRATE 50 UG/ML
50 INJECTION, SOLUTION INTRAMUSCULAR; INTRAVENOUS EVERY 5 MIN PRN
Status: DISCONTINUED | OUTPATIENT
Start: 2023-11-09 | End: 2023-11-09 | Stop reason: HOSPADM

## 2023-11-09 RX ORDER — METOCLOPRAMIDE HYDROCHLORIDE 5 MG/ML
10 INJECTION INTRAMUSCULAR; INTRAVENOUS ONCE AS NEEDED
Status: DISCONTINUED | OUTPATIENT
Start: 2023-11-09 | End: 2023-11-09 | Stop reason: HOSPADM

## 2023-11-09 RX ORDER — ACETAMINOPHEN 325 MG/1
650 TABLET ORAL EVERY 4 HOURS PRN
Status: DISCONTINUED | OUTPATIENT
Start: 2023-11-09 | End: 2023-11-09 | Stop reason: HOSPADM

## 2023-11-09 RX ORDER — PHENYLEPHRINE HYDROCHLORIDE 100 MG/ML
1 SOLUTION/ DROPS OPHTHALMIC
Status: COMPLETED | OUTPATIENT
Start: 2023-11-09 | End: 2023-11-09

## 2023-11-09 RX ORDER — LIDOCAINE HYDROCHLORIDE 10 MG/ML
INJECTION, SOLUTION EPIDURAL; INFILTRATION; INTRACAUDAL; PERINEURAL AS NEEDED
Status: DISCONTINUED | OUTPATIENT
Start: 2023-11-09 | End: 2023-11-09 | Stop reason: HOSPADM

## 2023-11-09 RX ORDER — LIDOCAINE HYDROCHLORIDE 10 MG/ML
0.1 INJECTION, SOLUTION EPIDURAL; INFILTRATION; INTRACAUDAL; PERINEURAL ONCE
Status: DISCONTINUED | OUTPATIENT
Start: 2023-11-09 | End: 2023-11-09 | Stop reason: HOSPADM

## 2023-11-09 RX ORDER — PREDNISOLONE ACETATE 10 MG/ML
SUSPENSION/ DROPS OPHTHALMIC
Qty: 5 ML | Refills: 2 | Status: SHIPPED | OUTPATIENT
Start: 2023-11-09 | End: 2024-01-12 | Stop reason: WASHOUT

## 2023-11-09 RX ORDER — SODIUM CHLORIDE, SODIUM LACTATE, POTASSIUM CHLORIDE, CALCIUM CHLORIDE 600; 310; 30; 20 MG/100ML; MG/100ML; MG/100ML; MG/100ML
100 INJECTION, SOLUTION INTRAVENOUS CONTINUOUS
Status: DISCONTINUED | OUTPATIENT
Start: 2023-11-09 | End: 2023-11-09 | Stop reason: HOSPADM

## 2023-11-09 RX ADMIN — CYCLOPENTOLATE HYDROCHLORIDE 1 DROP: 10 SOLUTION/ DROPS OPHTHALMIC at 09:21

## 2023-11-09 RX ADMIN — PHENYLEPHRINE HYDROCHLORIDE 1 DROP: 100 SOLUTION/ DROPS OPHTHALMIC at 09:31

## 2023-11-09 RX ADMIN — TROPICAMIDE 1 DROP: 10 SOLUTION/ DROPS OPHTHALMIC at 10:17

## 2023-11-09 RX ADMIN — SODIUM CHLORIDE, SODIUM LACTATE, POTASSIUM CHLORIDE, AND CALCIUM CHLORIDE: .6; .31; .03; .02 INJECTION, SOLUTION INTRAVENOUS at 11:16

## 2023-11-09 RX ADMIN — MIDAZOLAM 0.5 MG: 1 INJECTION INTRAMUSCULAR; INTRAVENOUS at 11:18

## 2023-11-09 RX ADMIN — PHENYLEPHRINE HYDROCHLORIDE 1 DROP: 100 SOLUTION/ DROPS OPHTHALMIC at 09:26

## 2023-11-09 RX ADMIN — TROPICAMIDE 1 DROP: 10 SOLUTION/ DROPS OPHTHALMIC at 10:22

## 2023-11-09 RX ADMIN — TROPICAMIDE 1 DROP: 10 SOLUTION/ DROPS OPHTHALMIC at 10:12

## 2023-11-09 RX ADMIN — TETRACAINE HYDROCHLORIDE 1 DROP: 5 SOLUTION/ DROPS OPHTHALMIC at 09:21

## 2023-11-09 RX ADMIN — FENTANYL CITRATE 25 MCG: 50 INJECTION, SOLUTION INTRAMUSCULAR; INTRAVENOUS at 11:16

## 2023-11-09 RX ADMIN — MIDAZOLAM 0.5 MG: 1 INJECTION INTRAMUSCULAR; INTRAVENOUS at 11:16

## 2023-11-09 RX ADMIN — PHENYLEPHRINE HYDROCHLORIDE 1 DROP: 100 SOLUTION/ DROPS OPHTHALMIC at 09:21

## 2023-11-09 ASSESSMENT — PAIN SCALES - GENERAL
PAIN_LEVEL: 0
PAINLEVEL_OUTOF10: 0 - NO PAIN

## 2023-11-09 ASSESSMENT — COLUMBIA-SUICIDE SEVERITY RATING SCALE - C-SSRS
1. IN THE PAST MONTH, HAVE YOU WISHED YOU WERE DEAD OR WISHED YOU COULD GO TO SLEEP AND NOT WAKE UP?: NO
6. HAVE YOU EVER DONE ANYTHING, STARTED TO DO ANYTHING, OR PREPARED TO DO ANYTHING TO END YOUR LIFE?: NO
2. HAVE YOU ACTUALLY HAD ANY THOUGHTS OF KILLING YOURSELF?: NO

## 2023-11-09 ASSESSMENT — PAIN - FUNCTIONAL ASSESSMENT
PAIN_FUNCTIONAL_ASSESSMENT: 0-10

## 2023-11-09 NOTE — OP NOTE
Phacoemulsification Cataract with Insertion Intraocular Lens (R) Operative Note     Date: 2023  OR Location: [facility] [place] [facility] [place]    Name: Charly Cornejo, : 1950, Age: 73 y.o., MRN: [id number], Sex: male    Diagnosis  Pre-op Diagnosis     * Combined form of age-related cataract, right eye [H25.811] Post-op Diagnosis     * Combined form of age-related cataract, right eye [H25.811]     Procedures  Phacoemulsification Cataract with Insertion Intraocular Lens  08646 - NM XCAPSL CTRC RMVL INSJ IO LENS PROSTH W/O ECP      Surgeons      * Laury Rubi - Primary    Resident/Fellow/Other Assistant:  Surgeon(s) and Role:    Procedure Summary  Anesthesia: Monitor Anesthesia Care  ASA: III  Anesthesia Staff: Anesthesiologist: Katey Woodson MD  CRNA: UMU Bennett-CRNA  C-AA: VALENTIN Jules  Estimated Blood Loss: 0 mL  Intra-op Medications:   Medication Name Total Dose   lidocaine PF (Xylocaine) 10 mg/mL (1 %) injection 1 mL   balanced salts (BSS) intraocular solution 3 mL   povidone-iodine 5 % ophthalmic solution 1 Application              Anesthesia Record               Intraprocedure I/O Totals          Intake    LR 10.00 mL    Total Intake 10 mL          Specimen: No specimens collected     Staff:   Circulator: Abeba David RN; Any Vargas, DRU; Seferino Sharma RN  Scrub Person: Annalisa Lyndsey; Monique Ta         Drains and/or Catheters: * None in log *    Tourniquet Times:         Implants:  Implants       Type Name Action Serial No.      Lens IMPLANT RECORD Implanted [id number]      18.5 DIOPTER, ACRYSOF IQ UV AND BLUE LIGHT FILTERING ACRYLIC FOLDABLE SINGLE-PIECE POSTERIOR CHAMBER LENSES W/ THE ULTRASERT PRE-LOADED DELIVERY SYSTEM, MODEL  Implanted [id number]              Findings: as below    Indications: Charly Cornejo is an 73 y.o. male who is having surgery for Combined form of age-related cataract, right eye [H25.811].    Procedure Details: Patient name: Charly Cornejo   Date of  birth: 1950   MRN: [id number]   Date of surgery: November 9, 2023  Preoperative diagnosis: Combined cataract of the RIGHT eye  Postoperative diagnosis: Combined cataract of the RIGHT eye  Procedure: Phacoemulsification of cataract with insertion of intraocular lens, RIGHT eye   Surgeon: Laury Rubi MD  Resident:  Anesthesia: MAC  Complications: None  Lens Inserted: Curtis ACU0T0 with a power of +18.50 D. Serial #: [id number]. Exp date: 10/02/2025.    Procedure description: After the risks, benefits, and alternatives of the planned procedure were discussed with the patient, informed consent was obtained at the preoperative evaluation. On the day of surgery, there were no updates to the consent form and any patient questions were answered. The patient was correctly identified in the preoperative area and the RIGHT eye was marked as the operative eye. Dilating drops were instilled into the RIGHT eye in the preoperative area. The patient was then taken back to the operating room and placed under sedation. The patient was prepped and draped in the standard, sterile ophthalmic fashion in preparation for intraocular surgery.    A lid speculum was placed into the RIGHT palpebral fissure and the operating microscope was brought into position. A paracentesis was made in superotemporal cornea at the limbus with a 1.0mm side port blade using a cotton tipped applicator to provide countertraction. Intracameral epishugarcaine was injected into the anterior chamber followed by Viscoat viscoelastic. Using 0.12 forceps to stabilize the globe, a 2.4mm keratome blade was used to create a limbal clear-corneal incision inferotemporally. A bent-needle cystotome and Utrata forceps were used to create a continuous curvilinear capsulorrhexis. Balanced salt saline solution on a blunt-tipped cannula was used to achieve hydrodissection.    A phacoemulsification device and a [place] spatula were used to remove the nucleus using a  divide-and-conquer technique. Residual cortical material was removed with the irrigation and aspiration handpiece. Provisc viscoelastic was then injected into the eye to reform the anterior chamber and to open the capsular bag. The posterior capsule was inspected and found to be clean and intact. The intraocular lens, Curtis ACU0T0 with a power of +18.50 diopters was injected into the capsular bag. A lens positioner was used to center the lens and ensure good position within the bag. The remaining viscoelastic was removed using irrigation and aspiration. Balanced salt saline solution on a blunt-tipped cannula was then used to hydrate the corneal stroma adjacent to the main wound and paracentesis site as well as to reform the anterior chamber. The wound was checked and found to be watertight with normal intraocular pressure verified using digital palpation. At the conclusion of the case, a well-centered intraocular lens with a good red reflex was observed. Tetracaine, betadine, BSS, and prednisolone acetate drops were instilled into the RIGHT eye. The lid speculum and drapes were removed. A clear plastic shield was then taped over the eye. The patient was taken to the recovery room in stable condition, having tolerated the procedure well. There were no complications.    Complications:  None; patient tolerated the procedure well.    Disposition: PACU - hemodynamically stable.  Condition: stable         Additional Details: none    Attending Attestation: I was present and scrubbed for the entire procedure.    Laury Rubi  Phone Number: [phone]

## 2023-11-09 NOTE — DISCHARGE INSTRUCTIONS
Surgeon: Laury Rubi MD     Patient name: Charly Cornejo    Date of surgery: November 9, 2023        DROP INSTRUCTIONS:    Prednisolone acetate 1% (pink or white cap) - One drop 4 times a day to operative eye    Ofloxacin (tan cap) - One drop 4 times a day to operative eye    Ketorolac or Diclofenac (gray cap) - One drop 4 times a day to operative eye    When putting different drops in around the same administration time, please wait 1-2 minutes between drops  Avoid sleeping on side of operative eye  No heavy lifting over 10-15lbs and no bending over  Do not get eye wet, no eye rubbing  Wear sunglasses or glasses during the day and the shield when sleeping at night  Please call immediately if you develop any redness, pain, decreased vision, flashes, or floaters      During office hours (8:30a-4:30p): [phone]  After office hours: [phone]  Hospital : [phone]   Pager: [phone] for Dr. Workman

## 2023-11-09 NOTE — ANESTHESIA PREPROCEDURE EVALUATION
Patient: Charly Cornejo    Procedure Information       Date/Time: 11/09/23 1030    Procedure: Phacoemulsification Cataract with Insertion Intraocular Lens (Right: Eye)    [place] [facility] [place] [facility] [place] [facility] [place] [facility] [place]    Surgeons: Laury Rubi MD            Relevant Problems   Anesthesia (within normal limits)  No personal history of anesthesia complications      Cardiovascular   (+) Benign essential hypertension   (+) Hypercholesteremia   (+) Peripheral vascular disease (CMS/HCC)      Endocrine   (+) Diabetic neuropathy (CMS/HCC)   (+) Hyperparathyroidism (CMS/HCC)   (+) 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/HCC)   (+) Nontoxic single thyroid nodule   (+) Primary hyperparathyroidism (CMS/HCC)      GI   (+) Chronic pancreatitis (CMS/HCC)   (+) Gastro-esophageal reflux disease without esophagitis   (+) Gastroesophageal reflux disease      /Renal   (+) Calculus of kidney with calculus of ureter   (+) Chronic kidney disease, unspecified   (+) Chronic liver disease   (+) Chronic renal failure syndrome   (+) Hyp hrt and chr kdny dis w/o hrt fail, w stg 5 chr kdny/ESRD (CMS/HCC)   (+) Liver lesion   (+) Nephrolithiasis   (+) Renal cell carcinoma (CMS/HCC)      Pulmonary  COPD - baseline 4L O2 via NC, takes daily nebulizers, no recent exacerbation   (+) COPD (chronic obstructive pulmonary disease) (CMS/HCC)   (+) Lung nodules      Hematology   (+) Anemia   (+) Anemia due to chronic kidney disease   (+) Thrombocytopenia (CMS/HCC)       Clinical information reviewed:   Tobacco  Allergies  Meds   Med Hx  Surg Hx   Fam Hx  Soc Hx        NPO Detail:  NPO/Void Status  Date of Last Liquid: 11/09/23  Time of Last Liquid: 0700  Date of Last Solid: 11/08/23  Time of Last Solid: 2200  Last Intake Type: Clear fluids; Food  Time of Last Void: 0937         Physical Exam    Airway  Mallampati: II     Cardiovascular   Rhythm: regular  Rate: normal     Dental   (+) upper  dentures, lower dentures     Pulmonary   (+) rhonchi     Abdominal      Other findings: Breathing comfortable, talking comfortably in full sentances          Anesthesia Plan    ASA 3     MAC     intravenous induction   Anesthetic plan and risks discussed with patient.    Plan discussed with CRNA.

## 2023-11-09 NOTE — ANESTHESIA POSTPROCEDURE EVALUATION
Patient: Charly Cornejo    Procedure Summary       Date: 11/09/23 [place] [facility] [place] [facility] [place] [facility] [place] [facility] [place]    Anesthesia Start: 1101 Anesthesia Stop: 1155    Procedure: Phacoemulsification Cataract with Insertion Intraocular Lens (Right: Eye) Diagnosis:       Combined form of age-related cataract, right eye      (Combined form of age-related cataract, right eye [H25.811])    Surgeons: Laury Rubi MD Responsible Provider: Katey Woodson MD    Anesthesia Type: MAC ASA Status: 3            Anesthesia Type: MAC    Vitals Value Taken Time   /66 11/09/23 1155   Temp 36.6 11/09/23 1155   Pulse 57 11/09/23 1155   Resp 16 11/09/23 1155   SpO2 100 11/09/23 1155       Anesthesia Post Evaluation    Patient location during evaluation: PACU  Patient participation: complete - patient participated  Level of consciousness: awake  Pain score: 0  Pain management: adequate  Airway patency: patent  Cardiovascular status: acceptable  Respiratory status: nasal cannula and acceptable  Hydration status: acceptable  Comments: No nausea        No notable events documented.

## 2023-11-09 NOTE — H&P
History Of Present Illness  Charly Cornejo is a 73 y.o. male presenting with a history of visually-significant cataract in the right eye here for cataract extraction and intraocular lens insertion of the right eye .     Past Medical History  Past Medical History:   Diagnosis Date    Acute bronchitis 06/13/2023    Acute urinary retention 06/13/2023    Arthritis     Asteroid hyalosis     Cataract     COPD (chronic obstructive pulmonary disease) (CMS/[facility])     4-L O2 NC at all times    Diabetic retinopathy (CMS/[facility])     Glaucoma suspect of both eyes     Hyperlipidemia     Hypertension     Neuropathy     Other disorders of lung     Lung trouble    Personal history of other diseases of the circulatory system 03/14/2017    History of abnormal electrocardiography    Personal history of other diseases of the circulatory system     History of essential hypertension    Personal history of other diseases of the respiratory system     History of chronic obstructive lung disease    Personal history of other specified conditions     History of shortness of breath       Surgical History  Past Surgical History:   Procedure Laterality Date    CATARACT EXTRACTION      left eye    COLONOSCOPY      FL GUIDED ABDOMINAL PARACENTESIS  07/06/2020    FL GUIDED ABDOMINAL PARACENTESIS 7/6/2020 [facility] INPATIENT LEGACY    HERNIA REPAIR  10/16/2017    Hernia Repair Inguinal Sliding    OTHER SURGICAL HISTORY  10/13/2021    Lung wedge resection    OTHER SURGICAL HISTORY  10/22/2021    Lung wedge resection    OTHER SURGICAL HISTORY      bronchoscopy    OTHER SURGICAL HISTORY      left kidney removed (cancer)    OTHER SURGICAL HISTORY      partial thyroidectomy    UPPER GASTROINTESTINAL ENDOSCOPY          Social History  He reports that he has been smoking cigarettes. He has been smoking an average of .5 packs per day. He has never used smokeless tobacco. He reports that he does not currently use alcohol. He reports that he does not use drugs.    Family  History  Family History   Problem Relation Name Age of Onset    Diabetes Mother      Leukemia Mother      Diabetes Father      Diabetes Brother          Allergies  Aspirin and Azithromycin    Review of Systems   Constitutional: Negative.    HENT: Negative.     Eyes: Negative.    Respiratory: Negative.     Cardiovascular: Negative.    Gastrointestinal: Negative.    Endocrine: Negative.  .    Neurological: Negative.    Psychiatric/Behavioral: Negative.   Physical Exam   General: Alert and Oriented x3  Head and neck: atraumatic and normacephalic  Lungs: The chest wall is symmetric and without deformity. No signs of respiratory distress. Lung sounds are clear in all lobes bilaterally.   Heart: Regular rate and rhythm.   Abdomen: Soft and non-tender   Last Recorded Vitals  Weight 57.6 kg (127 lb).    Relevant Results           Assessment/Plan   Principal Problem:    Combined form of age-related cataract, right eye  - history of visually-significant cataract in the right eye here for cataract extraction and intraocular lens insertion of the right eye            Lyn Mccray MD

## 2023-11-10 ENCOUNTER — OFFICE VISIT (OUTPATIENT)
Dept: OPHTHALMOLOGY | Facility: CLINIC | Age: 73
End: 2023-11-10
Payer: COMMERCIAL

## 2023-11-10 DIAGNOSIS — H52.13 MYOPIA, BILATERAL: ICD-10-CM

## 2023-11-10 DIAGNOSIS — Z96.1 PSEUDOPHAKIA: Primary | ICD-10-CM

## 2023-11-10 DIAGNOSIS — E11.3293 MILD NONPROLIFERATIVE DIABETIC RETINOPATHY OF BOTH EYES WITHOUT MACULAR EDEMA ASSOCIATED WITH TYPE 2 DIABETES MELLITUS (MULTI): ICD-10-CM

## 2023-11-10 DIAGNOSIS — E78.00 HYPERCHOLESTEREMIA: ICD-10-CM

## 2023-11-10 DIAGNOSIS — H02.539: ICD-10-CM

## 2023-11-10 DIAGNOSIS — E21.3 HYPERPARATHYROIDISM (MULTI): Primary | ICD-10-CM

## 2023-11-10 DIAGNOSIS — H43.23 ASTEROID HYALOSIS OF BOTH EYES: ICD-10-CM

## 2023-11-10 DIAGNOSIS — H40.003 GLAUCOMA SUSPECT, BOTH EYES: ICD-10-CM

## 2023-11-10 DIAGNOSIS — I10 BENIGN ESSENTIAL HYPERTENSION: ICD-10-CM

## 2023-11-10 DIAGNOSIS — H52.4 PRESBYOPIA: ICD-10-CM

## 2023-11-10 PROCEDURE — 99024 POSTOP FOLLOW-UP VISIT: CPT | Performed by: OPHTHALMOLOGY

## 2023-11-10 ASSESSMENT — SLIT LAMP EXAM - LIDS
COMMENTS: GOOD POSITION
COMMENTS: GOOD POSITION

## 2023-11-10 ASSESSMENT — ENCOUNTER SYMPTOMS: EYES NEGATIVE: 1

## 2023-11-10 ASSESSMENT — VISUAL ACUITY
METHOD: SNELLEN - LINEAR
OD_SC: 20/60

## 2023-11-10 ASSESSMENT — EXTERNAL EXAM - RIGHT EYE: OD_EXAM: NORMAL

## 2023-11-10 ASSESSMENT — EXTERNAL EXAM - LEFT EYE: OS_EXAM: NORMAL

## 2023-11-10 ASSESSMENT — PAIN SCALES - GENERAL: PAINLEVEL_OUTOF10: 0 - NO PAIN

## 2023-11-10 ASSESSMENT — PACHYMETRY: OD_CT(UM): 14

## 2023-11-10 NOTE — PROGRESS NOTES
Pseudophakia of right eyeZ96.1 - 11/9/23  -Doing well. Good vision. IOP good.  Prednisolone acetate 1% - 4 times a day  Ofloxacin - 4 times a day  Ketorolac - 4 times a day  No heavy lifting over 10-15 lbs, no bending over, do not get eye wet, no eye rubbing. Wear eye shield at bedtime and sunglasses/glasses during the day. Advised to call if any redness, pain, decreased vision, flashes, floaters. F/u 1 week - refract both eyes (autorefract first). Will plan for OCT macula at final postop visit.     Pseudophakia of left eyeZ96.1 - 9/28/23  -Doing well. Good vision. IOP good. Off all gtts.     Mild nonproliferative diabetic retinopathy of both eyes without macular lirsdZ73.3293  Diabetes foyqfsodT51.9  -OCT macula (8/29/23) - Normal thickness and contour OU. Intact IS-OS OU. No edema OU. 272/271.   -HbA1c= 5.3 (5/2/23). Mild nonproliferative diabetic retinopathy seen on exam. Continue close monitoring of blood glucose, blood pressure, and cholesterol. Plan for annual dilated eye exam.    Glaucoma suspect, both eyesH40.003  -OCT RNFL (6/18/21) - OD: Thin S. OS: Bord T. 164 (algorithm failure)/84.  -Will plan for OCT RNFL, HVF 24-2, pachymetry following healing from cataract surgery. Discussed risk of vision loss with diagnosis of glaucoma.     Retraction of lower crowbwP81.539  -No lagophthalmos, not bothersome to patient. Monitor for now and can consider referral to oculoplastic surgeon as needed.     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     MpegjwH29.10  MckazkhyntA17.4  -F/u 1 week - refract both eyes (autorefract first). Will plan for OCT macula at final postop visit.       No history of refractive surgery.   No FH of AMD/glaucoma

## 2023-11-10 NOTE — PATIENT INSTRUCTIONS
Surgeon: Laury Rubi MD      Patient name: Charly Cornejo    Date of visit: November 10, 2023      right eye    Prednisolone acetate (pink or white cap) - 4 times a day    Ofloxacin (tan cap) - 4 times a day    Ketorolac (gray cap) - 4 times a day      No heavy lifting over 10-15 lbs, no bending over, do not get eye wet, no eye rubbing. Wear eye shield at bedtime and sunglasses/glasses during the day. Please call immediately if you develop any redness, pain, decreased vision, flashes, floaters.       Surgical Scheduler (during office hours): Caro: [phone]  Office phone (after hours): [phone]-[facility] : [phone]                     Pager: [phone] for Dr. Workman

## 2023-11-12 PROBLEM — I20.9 ANGINA PECTORIS (CMS-HCC): Status: RESOLVED | Noted: 2023-09-01 | Resolved: 2023-11-12

## 2023-11-12 PROBLEM — R09.89 CHEST CONGESTION: Status: RESOLVED | Noted: 2023-06-13 | Resolved: 2023-11-12

## 2023-11-12 PROBLEM — M62.838 MUSCLE SPASM: Status: ACTIVE | Noted: 2023-11-12

## 2023-11-12 PROBLEM — I13.11: Status: RESOLVED | Noted: 2022-05-07 | Resolved: 2023-11-12

## 2023-11-12 ASSESSMENT — SLIT LAMP EXAM - LIDS
COMMENTS: GOOD POSITION
COMMENTS: GOOD POSITION

## 2023-11-12 ASSESSMENT — EXTERNAL EXAM - RIGHT EYE: OD_EXAM: NORMAL

## 2023-11-12 ASSESSMENT — EXTERNAL EXAM - LEFT EYE: OS_EXAM: NORMAL

## 2023-11-12 NOTE — ASSESSMENT & PLAN NOTE
Stable.  Continue rosuvastatin 20 mg and 81mg ASA.  Follow a Mediterranean diet and exercise as tolerated

## 2023-11-12 NOTE — PROGRESS NOTES
Pseudophakia of right eyeZ96.1 - 11/9/23  -Doing well. Good vision. IOP good.  Prednisolone acetate 1% - 4/3/2/1 weekly taper  Ofloxacin - 4 times a day x 3 more days, then stop  Ketorolac - 4 times a day x 1 more week, then stop  No heavy lifting over 15-20 lbs, do not get eye wet, no eye rubbing. Discontinue eye shield at bedtime but wear sunglasses/glasses during the day. Advised to call if any redness, pain, decreased vision, flashes, floaters. F/u 6-8 weeks - refract both eyes and dilate right eye and OCT macula OU.     Pseudophakia of left eyeZ96.1 - 9/28/23  -Doing well. Good vision. IOP good. Off all gtts.   -May use artificial tears for dryness.    Mild nonproliferative diabetic retinopathy of both eyes without macular qxqfhN55.3293  Diabetes fqbajwbiB14.9  -OCT macula (8/29/23) - Normal thickness and contour OU. Intact IS-OS OU. No edema OU. 272/271.   -HbA1c= 5.3 (5/2/23). Mild nonproliferative diabetic retinopathy seen on exam. Continue close monitoring of blood glucose, blood pressure, and cholesterol. Plan for annual dilated eye exam.    Glaucoma suspect, both eyesH40.003  -OCT RNFL (6/18/21) - OD: Thin S. OS: Bord T. 164 (algorithm failure)/84.  -Will plan for OCT RNFL, HVF 24-2, pachymetry following healing from cataract surgery. Discussed risk of vision loss with diagnosis of glaucoma.     Retraction of lower beehtgM47.539  -No lagophthalmos, not bothersome to patient. Monitor for now and can consider referral to oculoplastic surgeon as needed.     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     HqpgtpH28.10  OvvbmfpnnyC99.4  -F/u 6-8 weeks - refract both eyes and dilate right eye and OCT macula OU.       No history of refractive surgery.   No FH of AMD/glaucoma

## 2023-11-12 NOTE — ASSESSMENT & PLAN NOTE
Avoid nephrotoxic medications and drink adequate water.  Continue losartan 50mg and Farxiga 10 mg.  Follow with nephrology.

## 2023-11-12 NOTE — ASSESSMENT & PLAN NOTE
Continue allopurinol 100mg daily, strongly encouraged patient to discuss with nephrologist decreasing dose d/t CKD. Follow a low purine diet. Uric acid level ordered

## 2023-11-12 NOTE — ASSESSMENT & PLAN NOTE
Well-controlled.  Continue amlodipine 10mg, losartan 50mg, and metoprolol succinate 50mg regimen.  Follow strict DASH diet and exercise as tolerated

## 2023-11-13 RX ORDER — METOPROLOL SUCCINATE 25 MG/1
25 TABLET, EXTENDED RELEASE ORAL DAILY
Qty: 90 TABLET | Refills: 1 | Status: SHIPPED | OUTPATIENT
Start: 2023-11-13 | End: 2024-05-13 | Stop reason: SDUPTHER

## 2023-11-13 RX ORDER — LOSARTAN POTASSIUM 50 MG/1
50 TABLET ORAL DAILY
Qty: 90 TABLET | Refills: 1 | Status: SHIPPED | OUTPATIENT
Start: 2023-11-13 | End: 2024-05-13 | Stop reason: SDUPTHER

## 2023-11-13 RX ORDER — CALCITRIOL 0.25 UG/1
0.25 CAPSULE ORAL DAILY
Qty: 90 CAPSULE | Refills: 1 | Status: SHIPPED | OUTPATIENT
Start: 2023-11-13 | End: 2024-06-03 | Stop reason: SDUPTHER

## 2023-11-13 RX ORDER — ROSUVASTATIN CALCIUM 20 MG/1
20 TABLET, COATED ORAL NIGHTLY
Qty: 90 TABLET | Refills: 1 | Status: SHIPPED | OUTPATIENT
Start: 2023-11-13 | End: 2024-05-13 | Stop reason: SDUPTHER

## 2023-11-17 ENCOUNTER — OFFICE VISIT (OUTPATIENT)
Dept: OPHTHALMOLOGY | Facility: CLINIC | Age: 73
End: 2023-11-17
Payer: COMMERCIAL

## 2023-11-17 DIAGNOSIS — H43.23 ASTEROID HYALOSIS OF BOTH EYES: ICD-10-CM

## 2023-11-17 DIAGNOSIS — E11.3293 MILD NONPROLIFERATIVE DIABETIC RETINOPATHY OF BOTH EYES WITHOUT MACULAR EDEMA ASSOCIATED WITH TYPE 2 DIABETES MELLITUS (MULTI): ICD-10-CM

## 2023-11-17 DIAGNOSIS — H40.003 GLAUCOMA SUSPECT, BOTH EYES: ICD-10-CM

## 2023-11-17 DIAGNOSIS — H52.13 MYOPIA, BILATERAL: ICD-10-CM

## 2023-11-17 DIAGNOSIS — H52.4 PRESBYOPIA: ICD-10-CM

## 2023-11-17 DIAGNOSIS — H02.539: ICD-10-CM

## 2023-11-17 DIAGNOSIS — Z96.1 PSEUDOPHAKIA: Primary | ICD-10-CM

## 2023-11-17 PROCEDURE — 1160F RVW MEDS BY RX/DR IN RCRD: CPT | Performed by: OPHTHALMOLOGY

## 2023-11-17 PROCEDURE — 1159F MED LIST DOCD IN RCRD: CPT | Performed by: OPHTHALMOLOGY

## 2023-11-17 PROCEDURE — 4010F ACE/ARB THERAPY RXD/TAKEN: CPT | Performed by: OPHTHALMOLOGY

## 2023-11-17 PROCEDURE — 1126F AMNT PAIN NOTED NONE PRSNT: CPT | Performed by: OPHTHALMOLOGY

## 2023-11-17 PROCEDURE — 99024 POSTOP FOLLOW-UP VISIT: CPT | Performed by: OPHTHALMOLOGY

## 2023-11-17 PROCEDURE — 3008F BODY MASS INDEX DOCD: CPT | Performed by: OPHTHALMOLOGY

## 2023-11-17 PROCEDURE — 3044F HG A1C LEVEL LT 7.0%: CPT | Performed by: OPHTHALMOLOGY

## 2023-11-17 ASSESSMENT — REFRACTION_MANIFEST
OD_AXIS: 050
OD_SPHERE: -1.75
OD_CYLINDER: -0.50
METHOD_AUTOREFRACTION: 1
OD_SPHERE: -1.75
OS_AXIS: 090
OS_CYLINDER: -0.50
OD_AXIS: 051
OS_SPHERE: -1.00
OD_ADD: +2.50
OS_CYLINDER: -0.50
OS_AXIS: 091
OD_CYLINDER: -0.50
OS_ADD: +2.50
OS_SPHERE: -0.75

## 2023-11-17 ASSESSMENT — REFRACTION_WEARINGRX
OD_CYLINDER: -0.50
OS_ADD: +2.50
OS_CYLINDER: -0.50
OD_SPHERE: -1.00
OD_ADD: +2.50
OS_AXIS: 180
OD_AXIS: 065
OS_SPHERE: -0.75

## 2023-11-17 ASSESSMENT — VISUAL ACUITY
OD_SC: 20/80
METHOD: SNELLEN - LINEAR
OS_SC: 20/40

## 2023-11-17 ASSESSMENT — ENCOUNTER SYMPTOMS: EYES NEGATIVE: 1

## 2023-11-17 ASSESSMENT — TONOMETRY
OD_IOP_MMHG: 15
IOP_METHOD: GOLDMANN APPLANATION
OS_IOP_MMHG: 14

## 2023-12-03 DIAGNOSIS — J96.11 CHRONIC RESPIRATORY FAILURE WITH HYPOXIA (MULTI): Primary | ICD-10-CM

## 2023-12-04 DIAGNOSIS — N18.4 STAGE 4 CHRONIC KIDNEY DISEASE (MULTI): ICD-10-CM

## 2023-12-04 RX ORDER — IPRATROPIUM BROMIDE AND ALBUTEROL SULFATE 2.5; .5 MG/3ML; MG/3ML
SOLUTION RESPIRATORY (INHALATION)
Qty: 1260 ML | Refills: 3 | Status: SHIPPED | OUTPATIENT
Start: 2023-12-04

## 2023-12-04 RX ORDER — DAPAGLIFLOZIN 10 MG/1
10 TABLET, FILM COATED ORAL
Qty: 30 TABLET | Refills: 3 | Status: SHIPPED | OUTPATIENT
Start: 2023-12-04 | End: 2024-03-20

## 2023-12-08 ENCOUNTER — APPOINTMENT (OUTPATIENT)
Dept: OPHTHALMOLOGY | Facility: CLINIC | Age: 73
End: 2023-12-08
Payer: COMMERCIAL

## 2023-12-15 ENCOUNTER — APPOINTMENT (OUTPATIENT)
Dept: OPHTHALMOLOGY | Facility: CLINIC | Age: 73
End: 2023-12-15
Payer: COMMERCIAL

## 2023-12-23 DIAGNOSIS — G62.9 NEUROPATHY: ICD-10-CM

## 2023-12-26 RX ORDER — GABAPENTIN 100 MG/1
200 CAPSULE ORAL 3 TIMES DAILY
Qty: 540 CAPSULE | Refills: 0 | Status: SHIPPED | OUTPATIENT
Start: 2023-12-26 | End: 2024-04-25 | Stop reason: SDUPTHER

## 2024-01-11 ASSESSMENT — CUP TO DISC RATIO: OD_RATIO: 0.3

## 2024-01-11 ASSESSMENT — EXTERNAL EXAM - RIGHT EYE: OD_EXAM: NORMAL

## 2024-01-11 ASSESSMENT — SLIT LAMP EXAM - LIDS
COMMENTS: GOOD POSITION
COMMENTS: GOOD POSITION

## 2024-01-11 ASSESSMENT — EXTERNAL EXAM - LEFT EYE: OS_EXAM: NORMAL

## 2024-01-12 ENCOUNTER — OFFICE VISIT (OUTPATIENT)
Dept: OPHTHALMOLOGY | Facility: CLINIC | Age: 74
End: 2024-01-12
Payer: COMMERCIAL

## 2024-01-12 ENCOUNTER — LAB (OUTPATIENT)
Dept: LAB | Facility: LAB | Age: 74
End: 2024-01-12
Payer: COMMERCIAL

## 2024-01-12 DIAGNOSIS — H02.539: ICD-10-CM

## 2024-01-12 DIAGNOSIS — N18.4 STAGE 4 CHRONIC KIDNEY DISEASE (MULTI): ICD-10-CM

## 2024-01-12 DIAGNOSIS — Z96.1 PSEUDOPHAKIA: Primary | ICD-10-CM

## 2024-01-12 DIAGNOSIS — H52.4 PRESBYOPIA: ICD-10-CM

## 2024-01-12 DIAGNOSIS — H40.003 GLAUCOMA SUSPECT, BOTH EYES: ICD-10-CM

## 2024-01-12 DIAGNOSIS — E11.3293 MILD NONPROLIFERATIVE DIABETIC RETINOPATHY OF BOTH EYES WITHOUT MACULAR EDEMA ASSOCIATED WITH TYPE 2 DIABETES MELLITUS (MULTI): ICD-10-CM

## 2024-01-12 DIAGNOSIS — H43.23 ASTEROID HYALOSIS OF BOTH EYES: ICD-10-CM

## 2024-01-12 DIAGNOSIS — M1A.9XX0 CHRONIC GOUT WITHOUT TOPHUS, UNSPECIFIED CAUSE, UNSPECIFIED SITE: ICD-10-CM

## 2024-01-12 DIAGNOSIS — H52.13 MYOPIA, BILATERAL: ICD-10-CM

## 2024-01-12 LAB
25(OH)D3 SERPL-MCNC: 35 NG/ML (ref 30–100)
ALBUMIN SERPL BCP-MCNC: 4.3 G/DL (ref 3.4–5)
ANION GAP SERPL CALC-SCNC: 15 MMOL/L (ref 10–20)
BUN SERPL-MCNC: 36 MG/DL (ref 6–23)
CALCIUM SERPL-MCNC: 10 MG/DL (ref 8.6–10.6)
CHLORIDE SERPL-SCNC: 104 MMOL/L (ref 98–107)
CO2 SERPL-SCNC: 27 MMOL/L (ref 21–32)
CREAT SERPL-MCNC: 2.89 MG/DL (ref 0.5–1.3)
CREAT UR-MCNC: 146.8 MG/DL (ref 20–370)
EGFRCR SERPLBLD CKD-EPI 2021: 22 ML/MIN/1.73M*2
GLUCOSE SERPL-MCNC: 141 MG/DL (ref 74–99)
MICROALBUMIN UR-MCNC: 140.6 MG/L
MICROALBUMIN/CREAT UR: 95.8 UG/MG CREAT
PHOSPHATE SERPL-MCNC: 2.2 MG/DL (ref 2.5–4.9)
POTASSIUM SERPL-SCNC: 4.1 MMOL/L (ref 3.5–5.3)
PTH-INTACT SERPL-MCNC: 33.8 PG/ML (ref 18.5–88)
SODIUM SERPL-SCNC: 142 MMOL/L (ref 136–145)
URATE SERPL-MCNC: 7.5 MG/DL (ref 4–7.5)

## 2024-01-12 PROCEDURE — 92134 CPTRZ OPH DX IMG PST SGM RTA: CPT | Mod: BILATERAL PROCEDURE | Performed by: OPHTHALMOLOGY

## 2024-01-12 PROCEDURE — 84550 ASSAY OF BLOOD/URIC ACID: CPT

## 2024-01-12 PROCEDURE — 80069 RENAL FUNCTION PANEL: CPT

## 2024-01-12 PROCEDURE — 83970 ASSAY OF PARATHORMONE: CPT

## 2024-01-12 PROCEDURE — 82570 ASSAY OF URINE CREATININE: CPT

## 2024-01-12 PROCEDURE — 82306 VITAMIN D 25 HYDROXY: CPT

## 2024-01-12 PROCEDURE — 82043 UR ALBUMIN QUANTITATIVE: CPT

## 2024-01-12 PROCEDURE — 36415 COLL VENOUS BLD VENIPUNCTURE: CPT

## 2024-01-12 PROCEDURE — 99024 POSTOP FOLLOW-UP VISIT: CPT | Performed by: OPHTHALMOLOGY

## 2024-01-12 ASSESSMENT — TONOMETRY
IOP_METHOD: GOLDMANN APPLANATION
OS_IOP_MMHG: 10
OD_IOP_MMHG: 10

## 2024-01-12 ASSESSMENT — ENCOUNTER SYMPTOMS
MUSCULOSKELETAL NEGATIVE: 0
RESPIRATORY NEGATIVE: 0
ENDOCRINE NEGATIVE: 0
CONSTITUTIONAL NEGATIVE: 0
NEUROLOGICAL NEGATIVE: 0
HEMATOLOGIC/LYMPHATIC NEGATIVE: 0
CARDIOVASCULAR NEGATIVE: 0
PSYCHIATRIC NEGATIVE: 0
GASTROINTESTINAL NEGATIVE: 0
EYES NEGATIVE: 1
ALLERGIC/IMMUNOLOGIC NEGATIVE: 0

## 2024-01-12 ASSESSMENT — REFRACTION_MANIFEST
OS_CYLINDER: -0.50
OD_ADD: +2.50
OS_ADD: +2.50
OS_SPHERE: -0.50
OD_CYLINDER: -0.50
OD_AXIS: 050
OS_AXIS: 090
OD_SPHERE: -1.00

## 2024-01-12 ASSESSMENT — VISUAL ACUITY
OS_SC: 20/30
OD_SC: 20/60
METHOD: SNELLEN - LINEAR

## 2024-01-12 NOTE — PROGRESS NOTES
Pseudophakia of right eyeZ96.1 - 11/9/23  Pseudophakia of left eyeZ96.1 - 9/28/23  -Doing well. Good vision. IOP good. Off all gtts.   -May use artificial tears for dryness.  -F/u 6-8 months - refract OU, dilate OU for PCO check, OCT macula, OCT RNFL, pachymetry.     Mild nonproliferative diabetic retinopathy of both eyes without macular vikizD26.3293  Diabetes hmwcjlsjW83.9  -OCT macula (1/12/24) - Normal thickness and contour OU. Intact IS-OS OU. No edema OU. 275/273. Stable from 8/29/23.   -HbA1c= 5.3 (5/2/23). Mild nonproliferative diabetic retinopathy seen on exam. Continue close monitoring of blood glucose, blood pressure, and cholesterol.     Glaucoma suspect, both eyesH40.003  -OCT RNFL (6/18/21) - OD: Thin S. OS: Raymond T. 164 (algorithm failure)/84.  -Discussed risk of vision loss with diagnosis of glaucoma.   -F/u 6-8 months - refract OU, dilate OU for PCO check, OCT macula, OCT RNFL, pachymetry.     Retraction of lower toemkpL81.539  -No lagophthalmos, not bothersome to patient. Monitor for now and can consider referral to oculoplastic surgeon as needed.     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     PagsihL23.10  YpvoabdtckT68.4  -Does not drive.   -New Rx given      No history of refractive surgery.   No FH of AMD/glaucoma

## 2024-01-13 DIAGNOSIS — N40.0 BENIGN PROSTATIC HYPERPLASIA, UNSPECIFIED WHETHER LOWER URINARY TRACT SYMPTOMS PRESENT: ICD-10-CM

## 2024-01-15 ENCOUNTER — APPOINTMENT (OUTPATIENT)
Dept: RADIOLOGY | Facility: HOSPITAL | Age: 74
End: 2024-01-15
Payer: COMMERCIAL

## 2024-01-15 ENCOUNTER — APPOINTMENT (OUTPATIENT)
Dept: CARDIOLOGY | Facility: HOSPITAL | Age: 74
End: 2024-01-15
Payer: COMMERCIAL

## 2024-01-15 ENCOUNTER — HOSPITAL ENCOUNTER (EMERGENCY)
Facility: HOSPITAL | Age: 74
Discharge: HOME | End: 2024-01-15
Attending: STUDENT IN AN ORGANIZED HEALTH CARE EDUCATION/TRAINING PROGRAM
Payer: COMMERCIAL

## 2024-01-15 VITALS
WEIGHT: 120 LBS | HEART RATE: 63 BPM | BODY MASS INDEX: 18.83 KG/M2 | RESPIRATION RATE: 15 BRPM | SYSTOLIC BLOOD PRESSURE: 110 MMHG | DIASTOLIC BLOOD PRESSURE: 65 MMHG | OXYGEN SATURATION: 98 % | TEMPERATURE: 99.2 F | HEIGHT: 67 IN

## 2024-01-15 DIAGNOSIS — J44.9 CHRONIC OBSTRUCTIVE PULMONARY DISEASE, UNSPECIFIED COPD TYPE (MULTI): ICD-10-CM

## 2024-01-15 DIAGNOSIS — R53.1 GENERAL WEAKNESS: Primary | ICD-10-CM

## 2024-01-15 LAB
ALBUMIN SERPL BCP-MCNC: 3.6 G/DL (ref 3.4–5)
ALP SERPL-CCNC: 74 U/L (ref 33–136)
ALT SERPL W P-5'-P-CCNC: 49 U/L (ref 10–52)
ANION GAP SERPL CALC-SCNC: 13 MMOL/L (ref 10–20)
APPEARANCE UR: CLEAR
AST SERPL W P-5'-P-CCNC: 41 U/L (ref 9–39)
ATRIAL RATE: 76 BPM
BASE EXCESS BLDV CALC-SCNC: 0.8 MMOL/L (ref -2–3)
BASOPHILS # BLD AUTO: 0.01 X10*3/UL (ref 0–0.1)
BASOPHILS NFR BLD AUTO: 0.3 %
BILIRUB SERPL-MCNC: 0.7 MG/DL (ref 0–1.2)
BILIRUB UR STRIP.AUTO-MCNC: NEGATIVE MG/DL
BNP SERPL-MCNC: 28 PG/ML (ref 0–99)
BODY TEMPERATURE: 37 DEGREES CELSIUS
BUN SERPL-MCNC: 33 MG/DL (ref 6–23)
CALCIUM SERPL-MCNC: 8.6 MG/DL (ref 8.6–10.3)
CARDIAC TROPONIN I PNL SERPL HS: 10 NG/L (ref 0–20)
CARDIAC TROPONIN I PNL SERPL HS: 8 NG/L (ref 0–20)
CHLORIDE SERPL-SCNC: 105 MMOL/L (ref 98–107)
CO2 SERPL-SCNC: 25 MMOL/L (ref 21–32)
COLOR UR: YELLOW
CREAT SERPL-MCNC: 2.62 MG/DL (ref 0.5–1.3)
EGFRCR SERPLBLD CKD-EPI 2021: 25 ML/MIN/1.73M*2
EOSINOPHIL # BLD AUTO: 0.09 X10*3/UL (ref 0–0.4)
EOSINOPHIL NFR BLD AUTO: 2.4 %
ERYTHROCYTE [DISTWIDTH] IN BLOOD BY AUTOMATED COUNT: 13.7 % (ref 11.5–14.5)
FLUAV RNA RESP QL NAA+PROBE: NOT DETECTED
FLUBV RNA RESP QL NAA+PROBE: NOT DETECTED
GLUCOSE SERPL-MCNC: 89 MG/DL (ref 74–99)
GLUCOSE UR STRIP.AUTO-MCNC: ABNORMAL MG/DL
HCO3 BLDV-SCNC: 26.6 MMOL/L (ref 22–26)
HCT VFR BLD AUTO: 38.6 % (ref 41–52)
HGB BLD-MCNC: 12.4 G/DL (ref 13.5–17.5)
IMM GRANULOCYTES # BLD AUTO: 0.03 X10*3/UL (ref 0–0.5)
IMM GRANULOCYTES NFR BLD AUTO: 0.8 % (ref 0–0.9)
INHALED O2 CONCENTRATION: 36 %
KETONES UR STRIP.AUTO-MCNC: ABNORMAL MG/DL
LACTATE SERPL-SCNC: 1.5 MMOL/L (ref 0.4–2)
LEUKOCYTE ESTERASE UR QL STRIP.AUTO: NEGATIVE
LYMPHOCYTES # BLD AUTO: 0.77 X10*3/UL (ref 0.8–3)
LYMPHOCYTES NFR BLD AUTO: 20.4 %
MCH RBC QN AUTO: 32.6 PG (ref 26–34)
MCHC RBC AUTO-ENTMCNC: 32.1 G/DL (ref 32–36)
MCV RBC AUTO: 102 FL (ref 80–100)
MONOCYTES # BLD AUTO: 0.33 X10*3/UL (ref 0.05–0.8)
MONOCYTES NFR BLD AUTO: 8.8 %
NEUTROPHILS # BLD AUTO: 2.54 X10*3/UL (ref 1.6–5.5)
NEUTROPHILS NFR BLD AUTO: 67.3 %
NITRITE UR QL STRIP.AUTO: NEGATIVE
NRBC BLD-RTO: 0 /100 WBCS (ref 0–0)
OXYHGB MFR BLDV: 52.9 % (ref 45–75)
P AXIS: 79 DEGREES
P OFFSET: 214 MS
P ONSET: 159 MS
PCO2 BLDV: 46 MM HG (ref 41–51)
PH BLDV: 7.37 PH (ref 7.33–7.43)
PH UR STRIP.AUTO: 5 [PH]
PLATELET # BLD AUTO: 202 X10*3/UL (ref 150–450)
PO2 BLDV: 35 MM HG (ref 35–45)
POTASSIUM SERPL-SCNC: 4.1 MMOL/L (ref 3.5–5.3)
PR INTERVAL: 132 MS
PROT SERPL-MCNC: 7 G/DL (ref 6.4–8.2)
PROT UR STRIP.AUTO-MCNC: ABNORMAL MG/DL
Q ONSET: 225 MS
QRS COUNT: 13 BEATS
QRS DURATION: 84 MS
QT INTERVAL: 388 MS
QTC CALCULATION(BAZETT): 436 MS
QTC FREDERICIA: 419 MS
R AXIS: 82 DEGREES
RBC # BLD AUTO: 3.8 X10*6/UL (ref 4.5–5.9)
RBC # UR STRIP.AUTO: NEGATIVE /UL
RBC #/AREA URNS AUTO: NORMAL /HPF
SAO2 % BLDV: 54 % (ref 45–75)
SARS-COV-2 RNA RESP QL NAA+PROBE: NOT DETECTED
SODIUM SERPL-SCNC: 139 MMOL/L (ref 136–145)
SP GR UR STRIP.AUTO: 1.01
T AXIS: 91 DEGREES
T OFFSET: 419 MS
TEST COMMENT: ABNORMAL
UROBILINOGEN UR STRIP.AUTO-MCNC: 4 MG/DL
VENTRICULAR RATE: 76 BPM
WBC # BLD AUTO: 3.8 X10*3/UL (ref 4.4–11.3)
WBC #/AREA URNS AUTO: NORMAL /HPF

## 2024-01-15 PROCEDURE — 81001 URINALYSIS AUTO W/SCOPE: CPT | Performed by: STUDENT IN AN ORGANIZED HEALTH CARE EDUCATION/TRAINING PROGRAM

## 2024-01-15 PROCEDURE — 93005 ELECTROCARDIOGRAM TRACING: CPT

## 2024-01-15 PROCEDURE — 84484 ASSAY OF TROPONIN QUANT: CPT

## 2024-01-15 PROCEDURE — 36415 COLL VENOUS BLD VENIPUNCTURE: CPT | Performed by: STUDENT IN AN ORGANIZED HEALTH CARE EDUCATION/TRAINING PROGRAM

## 2024-01-15 PROCEDURE — 71045 X-RAY EXAM CHEST 1 VIEW: CPT | Mod: FOREIGN READ | Performed by: RADIOLOGY

## 2024-01-15 PROCEDURE — 2500000002 HC RX 250 W HCPCS SELF ADMINISTERED DRUGS (ALT 637 FOR MEDICARE OP, ALT 636 FOR OP/ED): Performed by: STUDENT IN AN ORGANIZED HEALTH CARE EDUCATION/TRAINING PROGRAM

## 2024-01-15 PROCEDURE — 99285 EMERGENCY DEPT VISIT HI MDM: CPT | Mod: 25 | Performed by: STUDENT IN AN ORGANIZED HEALTH CARE EDUCATION/TRAINING PROGRAM

## 2024-01-15 PROCEDURE — 94640 AIRWAY INHALATION TREATMENT: CPT

## 2024-01-15 PROCEDURE — 84075 ASSAY ALKALINE PHOSPHATASE: CPT | Performed by: STUDENT IN AN ORGANIZED HEALTH CARE EDUCATION/TRAINING PROGRAM

## 2024-01-15 PROCEDURE — 96374 THER/PROPH/DIAG INJ IV PUSH: CPT

## 2024-01-15 PROCEDURE — 83880 ASSAY OF NATRIURETIC PEPTIDE: CPT

## 2024-01-15 PROCEDURE — 71045 X-RAY EXAM CHEST 1 VIEW: CPT

## 2024-01-15 PROCEDURE — 87636 SARSCOV2 & INF A&B AMP PRB: CPT

## 2024-01-15 PROCEDURE — 2500000004 HC RX 250 GENERAL PHARMACY W/ HCPCS (ALT 636 FOR OP/ED): Performed by: STUDENT IN AN ORGANIZED HEALTH CARE EDUCATION/TRAINING PROGRAM

## 2024-01-15 PROCEDURE — 82805 BLOOD GASES W/O2 SATURATION: CPT | Performed by: STUDENT IN AN ORGANIZED HEALTH CARE EDUCATION/TRAINING PROGRAM

## 2024-01-15 PROCEDURE — 85025 COMPLETE CBC W/AUTO DIFF WBC: CPT | Performed by: STUDENT IN AN ORGANIZED HEALTH CARE EDUCATION/TRAINING PROGRAM

## 2024-01-15 PROCEDURE — 83605 ASSAY OF LACTIC ACID: CPT

## 2024-01-15 PROCEDURE — 94640 AIRWAY INHALATION TREATMENT: CPT | Mod: 59

## 2024-01-15 RX ORDER — PREDNISONE 20 MG/1
60 TABLET ORAL DAILY
Qty: 12 TABLET | Refills: 0 | Status: SHIPPED | OUTPATIENT
Start: 2024-01-15 | End: 2024-01-20

## 2024-01-15 RX ORDER — IPRATROPIUM BROMIDE AND ALBUTEROL SULFATE 2.5; .5 MG/3ML; MG/3ML
3 SOLUTION RESPIRATORY (INHALATION) EVERY 20 MIN
Status: COMPLETED | OUTPATIENT
Start: 2024-01-15 | End: 2024-01-15

## 2024-01-15 RX ADMIN — IPRATROPIUM BROMIDE AND ALBUTEROL SULFATE 3 ML: 2.5; .5 SOLUTION RESPIRATORY (INHALATION) at 16:02

## 2024-01-15 RX ADMIN — IPRATROPIUM BROMIDE AND ALBUTEROL SULFATE 3 ML: 2.5; .5 SOLUTION RESPIRATORY (INHALATION) at 15:56

## 2024-01-15 RX ADMIN — IPRATROPIUM BROMIDE AND ALBUTEROL SULFATE 3 ML: 2.5; .5 SOLUTION RESPIRATORY (INHALATION) at 15:50

## 2024-01-15 RX ADMIN — METHYLPREDNISOLONE SODIUM SUCCINATE 125 MG: 125 INJECTION, POWDER, FOR SOLUTION INTRAMUSCULAR; INTRAVENOUS at 15:50

## 2024-01-15 RX ADMIN — SODIUM CHLORIDE 1000 ML: 9 INJECTION, SOLUTION INTRAVENOUS at 15:50

## 2024-01-15 ASSESSMENT — COLUMBIA-SUICIDE SEVERITY RATING SCALE - C-SSRS
1. IN THE PAST MONTH, HAVE YOU WISHED YOU WERE DEAD OR WISHED YOU COULD GO TO SLEEP AND NOT WAKE UP?: NO
2. HAVE YOU ACTUALLY HAD ANY THOUGHTS OF KILLING YOURSELF?: NO
6. HAVE YOU EVER DONE ANYTHING, STARTED TO DO ANYTHING, OR PREPARED TO DO ANYTHING TO END YOUR LIFE?: NO

## 2024-01-15 NOTE — ED TRIAGE NOTES
TRIAGE NOTE   I saw the patient as the Clinician in Triage and performed a brief history and physical exam, established acuity, and ordered appropriate tests to develop basic plan of care. Patient will be seen by an KATIE, resident and/or physician who will independently evaluate the patient. Please see subsequent provider notes for further details and disposition.     Brief HPI: In brief, Charly Cornejo is a 73 y.o. male that presents for 3-day history of a productive cough, intermittent central chest pain, and shortness of breath.  Patient is on 4 L oxygen chronically due to history of COPD.  He does still smoke.  He states he has felt weak and dizzy as well.  He reports a history of pleural effusions that he has had to have drained.  He also reports urinary frequency and dark urine.  He states that a few days ago he had prolonged episodes of diarrhea, which has resolved, but he states that he still feels dehydrated from this.  No abdominal pain.  No vomiting.  No syncope.  No fevers.     Focused Physical exam:   Alert and oriented, no acute distress.  In a wheelchair.  On oxygen nasal cannula.  Speaking in full sentences without pauses.  There are expiratory wheezes and faint rhonchi in bilateral lung fields.  Abdomen soft and nontender.  No peripheral edema.  Heart regular rate and rhythm.    Plan/MDM:   Workup initiated including chest x-ray, EKG, labs, and UA.  Please see subsequent provider note for further details and disposition.  I evaluated this patient in triage with the RN. Due to the patient's complaint, labs, imaging, and/or interventions were ordered by me in an attempt to expedite/facilitate patient care, however I am not participating in care after evaluation. This is a preliminary assessment. Patient does not appear in acute distress at this time. They are stable and will have a full evaluation as soon as possible. They will be cared for by another provider who will possibly order more labs, imaging  and/or interventions. Patient did not have a full ROS or PE completed by myself, however below is a summary with reasons for orders.  Patient to be reevaluated once in formal ED bed.

## 2024-01-15 NOTE — PROGRESS NOTES
Emergency Medicine Transition of Care Note.    I received Charly Cornejo in signout from Dr. Thompson.  Please see the previous ED provider note for all HPI, PE and MDM up to the time of signout at 1600. This is in addition to the primary record.    In brief Charly Cornejo is an 73 y.o. male presenting for   Chief Complaint   Patient presents with    Chest Pain    Shortness of Breath     At the time of signout we were awaiting: Reevaluation    Diagnoses as of 01/15/24 8478   General weakness   Chronic obstructive pulmonary disease, unspecified COPD type (CMS/HCC)       Medical Decision Making  This is a 73-year-old male who presents to the emergency department complaining of shortness of breath and weakness.  The patient states that he had diarrhea and believes he became dehydrated.  He has a history of COPD on home oxygen.  These combination of things made him feel weak.  He was evaluated in the emergency department and had an unremarkable workup.  He was treated with Solu-Medrol and DuoNebs.  He was given IV fluids.  On repeat assessment he felt much improved.  He wished for discharge home.  He will follow-up with his primary physicians.        Final diagnoses:   [R53.1] General weakness   [J44.9] Chronic obstructive pulmonary disease, unspecified COPD type (CMS/HCC)           Procedure  Procedures    Eder Palacios MD

## 2024-01-15 NOTE — ED TRIAGE NOTES
Patient ambulatory to ED with complaint of intermittent central chest pain and dyspnea x3 days. Endorses weakness, dizziness, and productive cough. Denies any pain currently. Hx of COPD on 4L NC, still a smoker.

## 2024-01-15 NOTE — ED PROVIDER NOTES
EMERGENCY MEDICINE EVALUATION NOTE    History of Present Illness     Chief Complaint:   Chief Complaint   Patient presents with    Chest Pain    Shortness of Breath       HPI: Charly Cornejo is a 73 y.o. male with past medical history of COPD on 4 L nasal cannula at baseline, hypertension, hyperlipidemia who presents with complaint of chest pain, diarrhea, and shortness of breath.  Patient states starting around 3 days ago he started develop profuse watery diarrhea which she states has been occurring persistently throughout the past 3 days.  He does also admit some increased urinary frequency.  He does state he started to develop some worsening shortness of breath during exertion and has been compliant with his home 4 L nasal cannula.  He does also admit some intermittent substernal chest pain which he states is more of a discomfort and does not currently have any chest pain.  He does also note some intermittent left-sided flank pain though states this has been ongoing and chronic for years  After nephrectomy.  Denies any fever, chills, rhinorrhea, congestion, hematuria, dysuria.    Previous History     Past Medical History:   Diagnosis Date    Acute bronchitis 06/13/2023    Acute urinary retention 06/13/2023    Arthritis     Asteroid hyalosis     Cataract     COPD (chronic obstructive pulmonary disease) (CMS/[facility])     4-L O2 NC at all times    Diabetic retinopathy (CMS/[facility])     Glaucoma suspect of both eyes     Hyperlipidemia     Hypertension     Neuropathy     Other disorders of lung     Lung trouble    Personal history of other diseases of the circulatory system 03/14/2017    History of abnormal electrocardiography    Personal history of other diseases of the circulatory system     History of essential hypertension    Personal history of other diseases of the respiratory system     History of chronic obstructive lung disease    Personal history of other specified conditions     History of shortness of breath      Past Surgical History:   Procedure Laterality Date    CATARACT EXTRACTION      left eye    COLONOSCOPY      FL GUIDED ABDOMINAL PARACENTESIS  07/06/2020    FL GUIDED ABDOMINAL PARACENTESIS 7/6/2020 [facility] [place] [facility]    HERNIA REPAIR  10/16/2017    Hernia Repair Inguinal Sliding    OTHER SURGICAL HISTORY  10/13/2021    Lung wedge resection    OTHER SURGICAL HISTORY  10/22/2021    Lung wedge resection    OTHER SURGICAL HISTORY      bronchoscopy    OTHER SURGICAL HISTORY      left kidney removed (cancer)    OTHER SURGICAL HISTORY      partial thyroidectomy    UPPER GASTROINTESTINAL ENDOSCOPY       Social History     Tobacco Use    Smoking status: Every Day     Packs/day: .5     Types: Cigarettes    Smokeless tobacco: Never    Tobacco comments:     Gets SOB with activity/stairs if not wearing O2 (has COPD). Can lay flat. Uses a cane. No illness last 30 days. Denies any personal or family member reaction to anesthesia.    Vaping Use    Vaping Use: Never used   Substance Use Topics    Alcohol use: Not Currently     Comment: quit drinking 6 years ago.    Drug use: Never     Family History   Problem Relation Name Age of Onset    Diabetes Mother      Leukemia Mother      Diabetes Father      Diabetes Brother       Allergies   Allergen Reactions    Aspirin Nausea And Vomiting and Other     GI Upset    Azithromycin Rash     Current Outpatient Medications   Medication Instructions    acetaminophen (TYLENOL) 650 mg, oral, Every 6 hours PRN    albuterol 90 mcg/actuation inhaler 2 puffs, inhalation, Every 4 hours PRN    allopurinol (ZYLOPRIM) 100 mg, oral, Daily RT    amitriptyline (Elavil) 10 mg tablet Take 1 tablet (10 mg) by mouth as needed at bedtime.    amLODIPine (NORVASC) 10 mg, oral, Daily RT    aspirin 81 mg, oral, Daily PRN    budesonide-glycopyr-formoterol (Breztri Aerosphere) 160-9-4.8 mcg/actuation HFA aerosol inhaler 2 puffs, inhalation, 2 times daily    calcitriol (ROCALTROL) 0.25 mcg, oral, Daily     cholecalciferol (Vitamin D-3) 125 MCG (5000 UT) capsule 1 capsule, oral, Daily    docusate sodium (Colace) 100 mg capsule 1 capsule, oral, 2 times daily PRN    Farxiga 10 mg, oral, Daily before breakfast    ferrous sulfate 325 (65 Fe) MG tablet 1 tablet, oral    finasteride (PROSCAR) 5 mg, oral, Daily    Fluarix Quad [id number], PF, syringe     folic acid (Folvite) 1 mg tablet 1 tablet, oral, Daily    gabapentin (NEURONTIN) 200 mg, oral, 3 times daily    hydrOXYzine HCL (Atarax) 25 mg tablet 1 tablet, oral, Every 6 hours PRN    ipratropium-albuteroL (Duo-Neb) 0.5-2.5 mg/3 mL nebulizer solution INHALE THE CONTENTS OF 1   VIAL VIA NEBULIZER 4 TIMES A DAY    losartan (COZAAR) 50 mg, oral, Daily    metoprolol succinate XL (TOPROL-XL) 25 mg, oral, Daily    multivitamin with iron (pediatric multivitamin-iron) tablet chewable split tablet 1 tablet, oral, Daily    omeprazole (PriLOSEC) 20 mg DR capsule 1 capsule, oral    oxygen (O2) therapy 4 Liter O2 - CONTINUOUS (route: Oxygen)    polyethylene glycol (GLYCOLAX, MIRALAX) 17 g, oral, Daily PRN    potassium chloride (Klor-Con) 20 mEq packet 20 mEq, oral, Daily    predniSONE (DELTASONE) 60 mg, oral, Daily    rosuvastatin (CRESTOR) 20 mg, oral, Nightly    tadalafil (CIALIS) 5 mg, oral, Daily    tamsulosin (FLOMAX) 0.4 mg, oral, Daily       Physical Exam     Appearance: Alert, oriented , cooperative,  in acute distress. Well nourished & well hydrated.     Skin: Intact,  dry skin, no lesions, rash, petechiae or purpura.      Eyes: PERRLA, EOMs intact,  Conjunctiva pink with no redness or exudates. Cornea & anterior chamber are clear, Eyelids without lesions. No scleral icterus.      ENT: Hearing grossly intact. External auditory canals patent, tympanic membranes intact with visible landmarks. Nares patent, mucus membranes moist. Dentition without lesions. Pharynx clear, uvula midline.      Neck: Supple, without meningismus. Thyroid not palpable. Trachea at midline. No  lymphadenopathy.     Pulmonary: Diminished breath sounds throughout otherwise clear bilaterally with good chest wall excursion. No rales, rhonchi or wheezing. No accessory muscle use or stridor.     Cardiac: Normal S1, S2 without murmur, rub, gallop or extrasystole. No JVD, Carotids without bruits.     Abdomen: Soft, nontender, active bowel sounds.  No palpable organomegaly.  No rebound or guarding.  No CVA tenderness.     Genitourinary: Exam deferred.     Musculoskeletal: Full range of motion. no pain, edema, or deformity. Pulses full and equal. No cyanosis or clubbing.      Neurological:  Cranial nerves II through XII are grossly intact, finger-nose touch is normal, normal sensation, no weakness, no focal findings identified.     Psychiatric: Appropriate mood and affect.      Results     Labs Reviewed   CBC WITH AUTO DIFFERENTIAL - Abnormal       Result Value    WBC 3.8 (*)     nRBC 0.0      RBC 3.80 (*)     Hemoglobin 12.4 (*)     Hematocrit 38.6 (*)      (*)     MCH 32.6      MCHC 32.1      RDW 13.7      Platelets 202      Neutrophils % 67.3      Immature Granulocytes %, Automated 0.8      Lymphocytes % 20.4      Monocytes % 8.8      Eosinophils % 2.4      Basophils % 0.3      Neutrophils Absolute 2.54      Immature Granulocytes Absolute, Automated 0.03      Lymphocytes Absolute 0.77 (*)     Monocytes Absolute 0.33      Eosinophils Absolute 0.09      Basophils Absolute 0.01     COMPREHENSIVE METABOLIC PANEL - Abnormal    Glucose 89      Sodium 139      Potassium 4.1      Chloride 105      Bicarbonate 25      Anion Gap 13      Urea Nitrogen 33 (*)     Creatinine 2.62 (*)     eGFR 25 (*)     Calcium 8.6      Albumin 3.6      Alkaline Phosphatase 74      Total Protein 7.0      AST 41 (*)     Bilirubin, Total 0.7      ALT 49     URINALYSIS WITH REFLEX CULTURE AND MICROSCOPIC - Abnormal    Color, Urine Yellow      Appearance, Urine Clear      Specific Gravity, Urine 1.015      pH, Urine 5.0      Protein, Urine  30 (1+) (*)     Glucose, Urine >=500 (3+) (*)     Blood, Urine NEGATIVE      Ketones, Urine 5 (TRACE) (*)     Bilirubin, Urine NEGATIVE      Urobilinogen, Urine 4.0 (*)     Nitrite, Urine NEGATIVE      Leukocyte Esterase, Urine NEGATIVE     BLOOD GAS VENOUS - Abnormal    POCT pH, Venous 7.37      POCT pCO2, Venous 46      POCT pO2, Venous 35      POCT SO2, Venous 54      POCT Oxy Hemoglobin, Venous 52.9      POCT Base Excess, Venous 0.8      POCT HCO3 Calculated, Venous 26.6 (*)     Patient Temperature 37.0      FiO2 36      Test Comment [facility]     SARS-COV-2 AND INFLUENZA A/B PCR - Normal    Flu A Result Not Detected      Flu B Result Not Detected      Coronavirus 2019, PCR Not Detected      Narrative:     This assay has received FDA Emergency Use Authorization (EUA) and  is only authorized for the duration of time that circumstances exist to justify the authorization of the emergency use of in vitro diagnostic tests for the detection of SARS-CoV-2 virus and/or diagnosis of COVID-19 infection under section 564(b)(1) of the Act, 21 U.S.C. 360bbb-3(b)(1). Testing for SARS-CoV-2 is only recommended for patients who meet current clinical and/or epidemiological criteria as defined by federal, state, or local public health directives. This assay is an in vitro diagnostic nucleic acid amplification test for the qualitative detection of SARS-CoV-2, Influenza A, and Influenza B from nasopharyngeal specimens and has been validated for use at [facility]. Negative results do not preclude COVID-19 infections or Influenza A/B infections, and should not be used as the sole basis for diagnosis, treatment, or other management decisions. If Influenza A/B and RSV PCR results are negative, testing for Parainfluenza virus, Adenovirus and Metapneumovirus is routinely performed for [facility] pediatric oncology and intensive care inpatients, and is available on other patients by placing an add-on request.    SERIAL  TROPONIN-INITIAL - Normal    Troponin I, High Sensitivity 10      Narrative:     Less than 99th percentile of normal range cutoff-  Female and children under 18 years old <14 ng/L; Male <21 ng/L: Negative  Repeat testing should be performed if clinically indicated.     Female and children under 18 years old 14-50 ng/L; Male 21-50 ng/L:  Consistent with possible cardiac damage and possible increased clinical   risk. Serial measurements may help to assess extent of myocardial damage.     >50 ng/L: Consistent with cardiac damage, increased clinical risk and  myocardial infarction. Serial measurements may help assess extent of   myocardial damage.      NOTE: Children less than 1 year old may have higher baseline troponin   levels and results should be interpreted in conjunction with the overall   clinical context.     NOTE: Troponin I testing is performed using a different   testing methodology at [facility] than at other   [facility]. Direct result comparisons should only   be made within the same method.   LACTATE - Normal    Lactate 1.5      Narrative:     Venipuncture immediately after or during the administration of Metamizole may lead to falsely low results. Testing should be performed immediately  prior to Metamizole dosing.   B-TYPE NATRIURETIC PEPTIDE - Normal    BNP 28      Narrative:        <100 pg/mL - Heart failure unlikely  100-299 pg/mL - Intermediate probability of acute heart                  failure exacerbation. Correlate with clinical                  context and patient history.    >=300 pg/mL - Heart Failure likely. Correlate with clinical                  context and patient history.    BNP testing is performed using different testing methodology at [facility] than at other [facility]. Direct result comparisons should only be made within the same method.      SERIAL TROPONIN, 1 HOUR - Normal    Troponin I, High Sensitivity 8      Narrative:     Less than  99th percentile of normal range cutoff-  Female and children under 18 years old <14 ng/L; Male <21 ng/L: Negative  Repeat testing should be performed if clinically indicated.     Female and children under 18 years old 14-50 ng/L; Male 21-50 ng/L:  Consistent with possible cardiac damage and possible increased clinical   risk. Serial measurements may help to assess extent of myocardial damage.     >50 ng/L: Consistent with cardiac damage, increased clinical risk and  myocardial infarction. Serial measurements may help assess extent of   myocardial damage.      NOTE: Children less than 1 year old may have higher baseline troponin   levels and results should be interpreted in conjunction with the overall   clinical context.     NOTE: Troponin I testing is performed using a different   testing methodology at [facility] than at other   [facility]. Direct result comparisons should only   be made within the same method.   URINALYSIS MICROSCOPIC WITH REFLEX CULTURE - Normal    WBC, Urine 1-5      RBC, Urine NONE     TROPONIN SERIES- (INITIAL, 1 HR)    Narrative:     The following orders were created for panel order Troponin I Series, High Sensitivity (0, 1 HR).  Procedure                               Abnormality         Status                     ---------                               -----------         ------                     Troponin I, High Sensiti...[[id number]]  Normal              Final result               Troponin, High Sensitivi...[[id number]]  Normal              Final result                 Please view results for these tests on the individual orders.   URINALYSIS WITH REFLEX CULTURE AND MICROSCOPIC    Narrative:     The following orders were created for panel order Urinalysis with Reflex Culture and Microscopic.  Procedure                               Abnormality         Status                     ---------                               -----------         ------                      Urinalysis with Reflex C...[[id number]]  Abnormal            Final result               Extra Urine Gray Tube[[id number]]                                                         Please view results for these tests on the individual orders.   EXTRA URINE GRAY TUBE     XR chest 1 view   Final Result   FINDINGS CONSISTENT WITH COPD.   NO EVIDENCE OF ACUTE CARDIOPULMONARY DISEASE.   NO SIGNIFICANT INTERVAL CHANGE WHEN COMPARED TO THE PRIOR STUDY..   ___    Signed by Clay Lucio MD            ED Course & Medical Decision Making     Medications   sodium chloride 0.9 % bolus 1,000 mL (0 mL intravenous Stopped 1/15/24 1620)   ipratropium-albuteroL (Duo-Neb) 0.5-2.5 mg/3 mL nebulizer solution 3 mL (3 mL nebulization Given 1/15/24 1602)   methylPREDNISolone sod succinate (SOLU-Medrol) injection 125 mg (125 mg intravenous Given 1/15/24 1550)     Diagnoses as of 01/18/24 1538   General weakness   Chronic obstructive pulmonary disease, unspecified COPD type (CMS/HCC)            Charly Cornejo is a 73 y.o. male with past medical history of COPD on 4 L nasal cannula at baseline, hypertension, hyperlipidemia who presents with complaint of chest pain, diarrhea, and shortness of breath.  Patient is on 4 L nasal cannula at baseline.  On examination he was saturating at his baseline on his home 4 L nasal cannula otherwise normotensive and afebrile.  Patient diminished in bilateral breath sounds although no significant wheezing.  This could be secondary to acute COPD exacerbation and also considered PE, ACS, arrhythmia, pneumonia.  EKG with a sinus rhythm and occasional PVCs noted with a rate of 76 bpm with baseline T wave inversions noted in the lateral leads when compared to prior EKG.  Lab work with baseline renal function with a creatinine of 2.62 and BUN of 33.  No significant electrolyte normality.  Lactate normal.  Mild leukopenia 3.8.  Initial repeat troponin were negative.  Baseline anemia.  No significant change in venous  blood gas with hypercapnia or acidosis not noted.  Influenza COVID-19 testing were negative.  Patient still pending response from DuoNeb treatment as well as IV Solu-Medrol and need for reassessment.  Send abdominal attending pending these findings.    Procedures   Procedures    Diagnosis     1. General weakness    2. Chronic obstructive pulmonary disease, unspecified COPD type (CMS/[facility])        Disposition     Signed out to oncoming attending pending final disposition.    ED Prescriptions       Medication Sig Dispense Start Date End Date Auth. Provider    predniSONE (Deltasone) 20 mg tablet Take 3 tablets (60 mg) by mouth once daily for 5 days. 12 tablet 1/15/2024 1/20/2024 MD Valeriano Mcadams,   01/18/24 2056

## 2024-01-16 RX ORDER — TAMSULOSIN HYDROCHLORIDE 0.4 MG/1
0.4 CAPSULE ORAL DAILY
Qty: 90 CAPSULE | Refills: 1 | Status: SHIPPED | OUTPATIENT
Start: 2024-01-16

## 2024-01-16 RX ORDER — FINASTERIDE 5 MG/1
5 TABLET, FILM COATED ORAL DAILY
Qty: 90 TABLET | Refills: 1 | Status: SHIPPED | OUTPATIENT
Start: 2024-01-16

## 2024-02-09 ENCOUNTER — ANCILLARY PROCEDURE (OUTPATIENT)
Dept: CARDIOLOGY | Facility: CLINIC | Age: 74
End: 2024-02-09
Payer: COMMERCIAL

## 2024-02-09 ENCOUNTER — OFFICE VISIT (OUTPATIENT)
Dept: CARDIOLOGY | Facility: CLINIC | Age: 74
End: 2024-02-09
Payer: COMMERCIAL

## 2024-02-09 VITALS
HEART RATE: 64 BPM | WEIGHT: 122 LBS | OXYGEN SATURATION: 99 % | DIASTOLIC BLOOD PRESSURE: 67 MMHG | HEIGHT: 67 IN | SYSTOLIC BLOOD PRESSURE: 103 MMHG | BODY MASS INDEX: 19.15 KG/M2

## 2024-02-09 DIAGNOSIS — I31.39 PERICARDIAL EFFUSION (HHS-HCC): ICD-10-CM

## 2024-02-09 DIAGNOSIS — I25.10 CORONARY ARTERY CALCIFICATION: ICD-10-CM

## 2024-02-09 DIAGNOSIS — E78.00 HYPERCHOLESTEREMIA: ICD-10-CM

## 2024-02-09 DIAGNOSIS — R94.31 ABNORMAL EKG: ICD-10-CM

## 2024-02-09 DIAGNOSIS — E78.2 MIXED HYPERLIPIDEMIA: Primary | ICD-10-CM

## 2024-02-09 DIAGNOSIS — I25.84 CORONARY ARTERY CALCIFICATION: ICD-10-CM

## 2024-02-09 DIAGNOSIS — I10 BENIGN ESSENTIAL HYPERTENSION: ICD-10-CM

## 2024-02-09 PROCEDURE — 99214 OFFICE O/P EST MOD 30 MIN: CPT | Performed by: INTERNAL MEDICINE

## 2024-02-09 PROCEDURE — 3078F DIAST BP <80 MM HG: CPT | Performed by: INTERNAL MEDICINE

## 2024-02-09 PROCEDURE — 3074F SYST BP LT 130 MM HG: CPT | Performed by: INTERNAL MEDICINE

## 2024-02-09 PROCEDURE — 93010 ELECTROCARDIOGRAM REPORT: CPT | Performed by: INTERNAL MEDICINE

## 2024-02-09 PROCEDURE — 1159F MED LIST DOCD IN RCRD: CPT | Performed by: INTERNAL MEDICINE

## 2024-02-09 PROCEDURE — 99214 OFFICE O/P EST MOD 30 MIN: CPT | Mod: 25,27 | Performed by: INTERNAL MEDICINE

## 2024-02-09 PROCEDURE — 93005 ELECTROCARDIOGRAM TRACING: CPT

## 2024-02-09 PROCEDURE — 3060F POS MICROALBUMINURIA REV: CPT | Performed by: INTERNAL MEDICINE

## 2024-02-09 PROCEDURE — 3008F BODY MASS INDEX DOCD: CPT | Performed by: INTERNAL MEDICINE

## 2024-02-09 PROCEDURE — 4010F ACE/ARB THERAPY RXD/TAKEN: CPT | Performed by: INTERNAL MEDICINE

## 2024-02-09 PROCEDURE — 1160F RVW MEDS BY RX/DR IN RCRD: CPT | Performed by: INTERNAL MEDICINE

## 2024-02-09 PROCEDURE — 1157F ADVNC CARE PLAN IN RCRD: CPT | Performed by: INTERNAL MEDICINE

## 2024-02-09 PROCEDURE — 1126F AMNT PAIN NOTED NONE PRSNT: CPT | Performed by: INTERNAL MEDICINE

## 2024-02-09 ASSESSMENT — PAIN SCALES - GENERAL: PAINLEVEL: 0-NO PAIN

## 2024-02-09 ASSESSMENT — ENCOUNTER SYMPTOMS
DEPRESSION: 0
LOSS OF SENSATION IN FEET: 0
OCCASIONAL FEELINGS OF UNSTEADINESS: 0

## 2024-02-09 NOTE — PROGRESS NOTES
Chief Complaint:   Follow-up (9 month fuv. Patient c/o increased SOB recently)     History Of Present Illness:    Charly Cornejo is a 73 y.o. male presenting with year old -American Male who is pertinent medical history notable for chronic alcohol use disorder, alcohol-induced pancreatitis, essential hypertension, chronic liver disease, COPD, left renal  mass with imaging consistent for renal cell carcinoma, pancreatic pseudocyst, tobacco abuse, hyperparathyroidism S/P parathyroidectomy who presents to cardiology clinic for follow up     He had a very complicated course from June through July 2020. Please see detailed encounters as well as discharge summaries found within the EMR. Since his recovery from his complicated course, he has been doing well from             able to walk and participate in his activities of daily living. He lives in a one floor apartment that he is able to managed by themselves. He does not endorse chest pain, heaviness, pressure. He has some moderate exertional shortness of breath which he reports is related to COPD. It is essentially unchanged. He continues to smoke, down to 10 cigarettes per day. He has completed pharmacological stress testing which was negative for inducible ischemia. He underwent open left radical nephrectomy on 2/15/2021. There, his intraoperative course and recovery was complicated by COPD exacerbation requiring intubation, sedation with interval development of alert large right pneumothorax drained by pigtail chest tube. He then underwent VATS for blebectomy on 02/22/2021 and ultimately was discharged home.      Since he was last seen, he unfortunately had additional pulmonary complications. He was admitted 9/24 -->10/7 for shortness of breath related to Pneumothorax. He was treated with CT but had continued air leak prompting OR for management. 9/27 he had a Bronchoscopy, BAL, Left VATS lower lobe wedge resection, lower lobe blebectomy, upper lobe wedge,  mechanical pleurodesis - complete but had persistent air leak. DVT scan done 10/4 positive for RUE DVT (axillary and brachial)--re imaged and seen by [facility] med who felt that his represented untreated chronic thrombus and did not recommend anticoagulation due to Cirrhosis Hx. He had repeat Bronchoscopy due to Left Lower Lobe Collapse with thick secretions present.      Since his discharge, he has continued to recover. He feels improved and feels that his breathing is stable to improved as well. He voices no cardiac symptoms at this time. He feels that he has been doing well with his medical issues. He is hopeful to get outside and walk more with better weather.      He returns today (11/29/2022) for follow up. He has been in his usual state of health he has been working to address many of his chronic medical conditions. He has met with general surgery to have a scalp mass addressed and feels good about this. He had no undue complications related to this procedure.. He continues to smoke cigarettes, but is working on reducing this. He denies any cardiovascular complaints. He is planning on getting some dental work done to help address some other issues. He has paperwork that he request be out for his dentist.     Today ( 5/2/2023) he returns for follow up. He has finally gotten his dentures and is back to eating. meals. Feels much better about this. He has followed up with multiple specialist since he was last sen. He has been started on Farxiga for Kidney Disease and is tolering this well. He is compliant with medications and his results are doing well. He has not been able to give up smoking,still hovers around 1/2 PPD. Engaging in sexual relations without chest pressure, pain, shortness of breath beyond his baseline. Overall, he offers no significant cardiovascular complaints    2/9/2024 -- He returns for follow up. He has overall been well from a cardiac standpoint. He notes some left sided abdominal discomfort hat  "seemes to alternate between Constipation and Explosive Diarrhea. He reports considerable flatus during these episodes. States that he has been having some issues regarding staying well-hydrated as well as keeping his weight up..  He does note some continued shortness of breath but does not seem to have any change.           Patient denies chest pain and angina. Pt denies shortness of breath, dyspnea on exertion, orthopnea, and paroxysmal nocturnal dyspnea beyond his baseline dyspnea.. Pt denies worsening lower extremity edema. Pt denies palpitations or syncope. No recent falls. No fever or chills. No cough. No change in bowel or bladder habits. No travel. No sick contacts. No recent travel     Past medical history: As above.  Medications: Reviewed.  Allergies: Reviewed.  Social history: Patient reports continued cigarette use. He is currently at one half pack per day. He's been abstinent from alcohol since March 2020. He denies illicit drug use.  Family history: No sudden cardiac death or premature coronary artery disease..     Last Recorded Vitals:  Vitals:    02/09/24 0847   BP: 103/67   Patient Position: Sitting   Pulse: 64   SpO2: 99%   Weight: 55.3 kg (122 lb)   Height: 1.702 m (5' 7\")       Past Medical History:  He has a past medical history of Acute bronchitis (06/13/2023), Acute urinary retention (06/13/2023), Arthritis, Asteroid hyalosis, Cataract, COPD (chronic obstructive pulmonary disease) (CMS/Formerly [facility]), Diabetic retinopathy (CMS/Formerly [facility]), Glaucoma suspect of both eyes, Hyperlipidemia, Hypertension, Neuropathy, Other disorders of lung, Personal history of other diseases of the circulatory system (03/14/2017), Personal history of other diseases of the circulatory system, Personal history of other diseases of the respiratory system, and Personal history of other specified conditions.    Past Surgical History:  He has a past surgical history that includes Other surgical history (10/13/2021); Other surgical history " (10/22/2021); Hernia repair (10/16/2017); FL guided abdominal paracentesis (07/06/2020); Cataract extraction; Colonoscopy; Upper gastrointestinal endoscopy; Other surgical history; Other surgical history; and Other surgical history.      Social History:  He reports that he has been smoking cigarettes. He has been smoking an average of .5 packs per day. He has never used smokeless tobacco. He reports that he does not currently use alcohol. He reports that he does not use drugs.    Family History:  Family History   Problem Relation Name Age of Onset    Diabetes Mother      Leukemia Mother      Diabetes Father      Diabetes Brother          Allergies:  Aspirin and Azithromycin    Outpatient Medications:  Current Outpatient Medications   Medication Instructions    acetaminophen (TYLENOL) 650 mg, oral, Every 6 hours PRN    albuterol 90 mcg/actuation inhaler 2 puffs, inhalation, Every 4 hours PRN    allopurinol (ZYLOPRIM) 100 mg, oral, Daily RT    amitriptyline (Elavil) 10 mg tablet Take 1 tablet (10 mg) by mouth as needed at bedtime.    amLODIPine (NORVASC) 10 mg, oral, Daily RT    budesonide-glycopyr-formoterol (Breztri Aerosphere) 160-9-4.8 mcg/actuation HFA aerosol inhaler 2 puffs, inhalation, 2 times daily    calcitriol (ROCALTROL) 0.25 mcg, oral, Daily    cholecalciferol (Vitamin D-3) 125 MCG (5000 UT) capsule 1 capsule, oral, Daily    docusate sodium (Colace) 100 mg capsule 1 capsule, oral, 2 times daily PRN    Farxiga 10 mg, oral, Daily before breakfast    ferrous sulfate 325 (65 Fe) MG tablet 1 tablet, oral    finasteride (PROSCAR) 5 mg, oral, Daily    Fluarix Quad 2431-4063, PF, syringe     folic acid (Folvite) 1 mg tablet 1 tablet, oral, Daily    gabapentin (NEURONTIN) 200 mg, oral, 3 times daily    ipratropium-albuteroL (Duo-Neb) 0.5-2.5 mg/3 mL nebulizer solution INHALE THE CONTENTS OF 1   VIAL VIA NEBULIZER 4 TIMES A DAY    losartan (COZAAR) 50 mg, oral, Daily    metoprolol succinate XL (TOPROL-XL) 25 mg,  oral, Daily    multivitamin with iron (pediatric multivitamin-iron) tablet chewable split tablet 1 tablet, oral, Daily    omeprazole (PriLOSEC) 20 mg DR capsule 1 capsule, oral    oxygen (O2) therapy 4 Liter O2 - CONTINUOUS (route: Oxygen)    polyethylene glycol (GLYCOLAX, MIRALAX) 17 g, oral, Daily PRN    rosuvastatin (CRESTOR) 20 mg, oral, Nightly    tadalafil (CIALIS) 5 mg, oral, Daily    tamsulosin (FLOMAX) 0.4 mg, oral, Daily       Physical Exam:  Physical Exam    Last Labs:  CBC -  Lab Results   Component Value Date    WBC 3.8 (L) 01/15/2024    HGB 12.4 (L) 01/15/2024    HCT 38.6 (L) 01/15/2024     (H) 01/15/2024     01/15/2024       CMP -  Lab Results   Component Value Date    CALCIUM 8.6 01/15/2024    PHOS 2.2 (L) 01/12/2024    PROT 7.0 01/15/2024    ALBUMIN 3.6 01/15/2024    AST 41 (H) 01/15/2024    ALT 49 01/15/2024    ALKPHOS 74 01/15/2024    BILITOT 0.7 01/15/2024       LIPID PANEL -   Lab Results   Component Value Date    CHOL 124 01/04/2023    TRIG 49 01/04/2023    HDL 69.6 01/04/2023    CHHDL 1.8 01/04/2023    LDLF 45 01/04/2023    VLDL 10 01/04/2023       RENAL FUNCTION PANEL -   Lab Results   Component Value Date    GLUCOSE 89 01/15/2024     01/15/2024    K 4.1 01/15/2024     01/15/2024    CO2 25 01/15/2024    ANIONGAP 13 01/15/2024    BUN 33 (H) 01/15/2024    CREATININE 2.62 (H) 01/15/2024    GFRMALE 30 (A) 08/21/2023    CALCIUM 8.6 01/15/2024    PHOS 2.2 (L) 01/12/2024    ALBUMIN 3.6 01/15/2024        Lab Results   Component Value Date    BNP 28 01/15/2024    HGBA1C 4.9 05/07/2023       Last Cardiology Tests:  ECG:  ECG 12 lead 01/15/2024 (Preliminary)  In office EKG1/14/2021  -- Normal sinus rhythm at 70 bpm  --T-wave inversions in V4 through V6  -- Rare PAC    Echo:  Echocardiogram 11/2022  1. Left ventricular systolic function is normal with a 55-60% estimated ejection fraction.  2. Spectral Doppler shows an impaired relaxation pattern of left ventricular diastolic  "filling.  3. The left atrium is moderate to severely dilated.  4. RVSP within normal limits.     Echocardiogram 09/2022  1. Left ventricular systolic function is normal with a 55-60% estimated ejection fraction.  2. Spectral Doppler shows an impaired relaxation pattern of left ventricular diastolic filling.  3. Trace to mild mitral valve regurgitation.      Echocardiogram 06/2020  1. The left ventricular systolic function is low normal with a 50-55% estimated ejection fraction.  2. Spectral Doppler shows an impaired relaxation pattern of left ventricular diastolic filling.  3. RVSP within normal limits     Ejection Fractions:  No results found for: \"EF\"    Cath:  No results found for this or any previous visit from the past 1095 days.      Stress Test:  Nuclear pharmacological stress testing 1/2021  FINDINGS:  Stress and rest images both demonstrate a normal distribution of  perfusion throughout all LV segments with no sign of ischemia.     TID: 1.03     ECG-gated images demonstrate normal LV size and myocardial  contractility with an LV ejection fraction of 65 % (normal above 45  percent).     IMPRESSION:  1. Normal stress myocardial perfusion imaging in response to  pharmacologic stress.  2. Well-maintained left ventricular function.    Cardiac Imaging:  No results found for this or any previous visit from the past 1095 days.        Lab review: I have personally reviewed the laboratory result(s)   Diagnostic review: I have personally reviewed the result(s) of the EKG and Echocardiogram .   Imaging review: I have  personally reviewed the result(s)     Assessment/Plan     Problem List Items Addressed This Visit       Abnormal EKG    Overview     Formatting of this note might be different from the original. st-t changes inf/lateral leads         Benign essential hypertension    Coronary artery calcification    Overview     Coronary artery calcification seen incidentally on noncardiac CT Imaging.   No Formal CT-CAC " "scoring          Hypercholesteremia    Overview     The ASCVD Risk score (Debra DK, et al., 2019) failed to calculate for the following reasons:    The valid total cholesterol range is 130 to 320 mg/dL    Lab Results   Component Value Date    CHOL 124 01/04/2023    CHOL 108 03/16/2022    CHOL 198 01/14/2021     Lab Results   Component Value Date    HDL 69.6 01/04/2023    HDL 54.7 03/16/2022    HDL 54.7 01/14/2021   No results found for: \"LDLCALC\"  Lab Results   Component Value Date    TRIG 49 01/04/2023    TRIG 57 03/16/2022    TRIG 49 11/07/2018   No components found for: \"CHOLHDL\"           Mixed hyperlipidemia - Primary    Pericardial effusion         Gold Graham DO  "

## 2024-02-12 ENCOUNTER — OFFICE VISIT (OUTPATIENT)
Dept: NEPHROLOGY | Facility: CLINIC | Age: 74
End: 2024-02-12
Payer: COMMERCIAL

## 2024-02-12 VITALS
WEIGHT: 122.8 LBS | BODY MASS INDEX: 19.23 KG/M2 | TEMPERATURE: 98 F | SYSTOLIC BLOOD PRESSURE: 128 MMHG | DIASTOLIC BLOOD PRESSURE: 79 MMHG | HEART RATE: 76 BPM

## 2024-02-12 DIAGNOSIS — N18.4 STAGE 4 CHRONIC KIDNEY DISEASE (MULTI): Primary | ICD-10-CM

## 2024-02-12 PROCEDURE — 4010F ACE/ARB THERAPY RXD/TAKEN: CPT | Performed by: INTERNAL MEDICINE

## 2024-02-12 PROCEDURE — 1159F MED LIST DOCD IN RCRD: CPT | Performed by: INTERNAL MEDICINE

## 2024-02-12 PROCEDURE — 3078F DIAST BP <80 MM HG: CPT | Performed by: INTERNAL MEDICINE

## 2024-02-12 PROCEDURE — 3074F SYST BP LT 130 MM HG: CPT | Performed by: INTERNAL MEDICINE

## 2024-02-12 PROCEDURE — 1126F AMNT PAIN NOTED NONE PRSNT: CPT | Performed by: INTERNAL MEDICINE

## 2024-02-12 PROCEDURE — 1157F ADVNC CARE PLAN IN RCRD: CPT | Performed by: INTERNAL MEDICINE

## 2024-02-12 PROCEDURE — 1160F RVW MEDS BY RX/DR IN RCRD: CPT | Performed by: INTERNAL MEDICINE

## 2024-02-12 PROCEDURE — 99214 OFFICE O/P EST MOD 30 MIN: CPT | Performed by: INTERNAL MEDICINE

## 2024-02-12 PROCEDURE — 3008F BODY MASS INDEX DOCD: CPT | Performed by: INTERNAL MEDICINE

## 2024-02-12 PROCEDURE — 3060F POS MICROALBUMINURIA REV: CPT | Performed by: INTERNAL MEDICINE

## 2024-02-12 NOTE — PROGRESS NOTES
JONELLE 10/9/23  For follow up, doing well.  Visited ER in Jan 2024 for diarrhea, continues to have intermittent diarrhea and constipation. Also c/o LLQ abdominal pain. Has not discussed this with PCP or GI  Not checking BP at home  Denies orthostatic symptoms    RoS negative for all other systems except as noted above.    Vitals:    02/12/24 1153   BP: 128/79   BP Location: Right arm   Patient Position: Sitting   BP Cuff Size: Adult   Pulse: 76   Temp: 36.7 °C (98 °F)   TempSrc: Temporal   Weight: 55.7 kg (122 lb 12.8 oz)      No distress  HEENT:  moist, no pallor  No edema khurram LE  Breath sounds khurram equal, clear  S1 S2 regular, normal, no rub or murmur  Abdomen soft  AAO x3, non focal    Sodium   Date/Time Value Ref Range Status   01/15/2024 02:13  136 - 145 mmol/L Final   01/12/2024 02:46  136 - 145 mmol/L Final   08/21/2023 12:50  136 - 145 mmol/L Final   07/06/2023 05:57  133 - 145 MMOL/L Final   07/05/2023 06:12  133 - 145 MMOL/L Final     Potassium   Date/Time Value Ref Range Status   01/15/2024 02:13 PM 4.1 3.5 - 5.3 mmol/L Final   01/12/2024 02:46 PM 4.1 3.5 - 5.3 mmol/L Final   08/21/2023 12:50 PM 4.5 3.5 - 5.3 mmol/L Final   07/06/2023 08:10 AM 4.4 3.4 - 5.1 MMOL/L Final     Comment:     Performed at [address] [address]   07/06/2023 05:57 AM SAMPLE HEMOLYZED TO BE RECOLLECTED 3.4 - 5.1 MMOL/L Final     Chloride   Date/Time Value Ref Range Status   01/15/2024 02:13  98 - 107 mmol/L Final   01/12/2024 02:46  98 - 107 mmol/L Final   08/21/2023 12:50  98 - 107 mmol/L Final   07/06/2023 05:57  97 - 107 MMOL/L Final   07/05/2023 06:12  97 - 107 MMOL/L Final     Urea Nitrogen   Date/Time Value Ref Range Status   01/15/2024 02:13 PM 33 (H) 6 - 23 mg/dL Final   01/12/2024 02:46 PM 36 (H) 6 - 23 mg/dL Final   08/21/2023 12:50 PM 22 6 - 23 mg/dL Final   07/06/2023 05:57 AM 22 8 - 25 MG/DL Final   07/05/2023 06:12 AM 23 8 - 25 MG/DL  Final     Creatinine   Date/Time Value Ref Range Status   01/15/2024 02:13 PM 2.62 (H) 0.50 - 1.30 mg/dL Final   01/12/2024 02:46 PM 2.89 (H) 0.50 - 1.30 mg/dL Final   08/21/2023 12:50 PM 2.25 (H) 0.50 - 1.30 mg/dL Final   07/06/2023 05:57 AM 2.4 (H) 0.4 - 1.6 MG/DL Final   07/05/2023 06:12 AM 2.3 (H) 0.4 - 1.6 MG/DL Final     eGFR   Date/Time Value Ref Range Status   01/15/2024 02:13 PM 25 (L) >60 mL/min/1.73m*2 Final     Comment:     Calculations of estimated GFR are performed using the 2021 CKD-EPI Study Refit equation without the race variable for the IDMS-Traceable creatinine methods.  https://jasn.asnjournals.org/content/early/2021/09/22/[email or web address]   01/12/2024 02:46 PM 22 (L) >60 mL/min/1.73m*2 Final     Comment:     Calculations of estimated GFR are performed using the 2021 CKD-EPI Study Refit equation without the race variable for the IDMS-Traceable creatinine methods.  https://jasn.asnjournals.org/content/early/2021/09/22/[email or web address]     ESTIMATED GFR   Date/Time Value Ref Range Status   07/06/2023 05:57 AM 28 mL/min/1.73 m2 Final     Comment:     CALCULATIONS OF ESTIMATED GFR ARE PERFORMED USING THE 2021 CKD-EPI   STUDY REFIT EQUATION WITHOUT THE RACE VARIABLE FOR THE IDMS-TRACEABLE   CREATININE METHODS.  https://jasn.asnjournals.org/content/early/2021/09/22/ASN.[email or web address]  Performed at [address] [address]     07/05/2023 06:12 AM 30 mL/min/1.73 m2 Final     Comment:     CALCULATIONS OF ESTIMATED GFR ARE PERFORMED USING THE 2021 CKD-EPI   STUDY REFIT EQUATION WITHOUT THE RACE VARIABLE FOR THE IDMS-TRACEABLE   CREATININE METHODS.  https://jasn.asnjournals.org/content/early/2021/09/22/[email or web address]  Performed at [address] [address]     07/03/2023 10:02 PM 33 mL/min/1.73 m2 Final     Comment:     CALCULATIONS OF ESTIMATED GFR ARE PERFORMED USING THE 2021 CKD-EPI   STUDY REFIT EQUATION WITHOUT THE RACE VARIABLE FOR THE IDMS-TRACEABLE   CREATININE  METHODS.  https://jasn.asnjournals.org/content/early/2021/09/22/ASN.[email or web address]  Performed at [address] [place] [facility] [address]       Calcium   Date/Time Value Ref Range Status   01/15/2024 02:13 PM 8.6 8.6 - 10.3 mg/dL Final   01/12/2024 02:46 PM 10.0 8.6 - 10.6 mg/dL Final   08/21/2023 12:50 PM 10.0 8.6 - 10.6 mg/dL Final   07/06/2023 05:57 AM 9.3 8.5 - 10.4 MG/DL Final   07/05/2023 06:12 AM 9.2 8.5 - 10.4 MG/DL Final     Phosphorus   Date/Time Value Ref Range Status   01/12/2024 02:46 PM 2.2 (L) 2.5 - 4.9 mg/dL Final     Comment:     The performance characteristics of phosphorus testing in heparinized plasma have been validated by the individual  laboratory site where testing is performed. Testing on heparinized plasma is not approved by the FDA; however, such approval is not necessary.   08/21/2023 12:50 PM 3.9 2.5 - 4.9 mg/dL Final     Comment:      The performance characteristics of phosphorus testing in   heparinized plasma have been validated by the individual     laboratory site where testing is performed. Testing    on heparinized plasma is not approved by the FDA;    however, such approval is not necessary.     05/25/2023 10:37 AM 3.9 2.5 - 4.9 mg/dL Final     Comment:      The performance characteristics of phosphorus testing in   heparinized plasma have been validated by the individual     laboratory site where testing is performed. Testing    on heparinized plasma is not approved by the FDA;    however, such approval is not necessary.     09/04/2022 02:47 PM 3.0 2.5 - 4.9 mg/dL Final     Comment:      The performance characteristics of phosphorus testing in   heparinized plasma have been validated by the individual     laboratory site where testing is performed. Testing    on heparinized plasma is not approved by the FDA;    however, such approval is not necessary.  MILD HEMOLYSIS DETECTED. The result may be falsely elevated due to  hemolysis or other interferents. Clinical  correlation is recommended.  Repeat testing may be considered.       Parathyroid Hormone, Intact   Date/Time Value Ref Range Status   01/12/2024 02:46 PM 33.8 18.5 - 88.0 pg/mL Final     PTH   Date/Time Value Ref Range Status   08/21/2023 12:50 PM 63.0 18.5 - 88.0 pg/mL Final   05/25/2023 10:37 .0 (H) 18.5 - 88.0 pg/mL Final   04/26/2021 12:41 PM 67.3 18.5 - 88.0 pg/mL Final     Vitamin D, 25-Hydroxy, Total   Date/Time Value Ref Range Status   01/12/2024 02:46 PM 35 30 - 100 ng/mL Final     Vitamin D, 25-Hydroxy   Date/Time Value Ref Range Status   01/04/2023 10:55 AM 38 ng/mL Final     Comment:     .  DEFICIENCY:         < 20   NG/ML  INSUFFICIENCY:      20-29  NG/ML  SUFFICIENCY:         NG/ML    THIS ASSAY ACCURATELY QUANTIFIES THE SUM OF  VITAMIN D3, 25-HYDROXY AND VIT D2,25-HYDROXY.     03/16/2022 11:45 AM 53 ng/mL Final     Comment:     .  DEFICIENCY:         < 20   NG/ML  INSUFFICIENCY:      20-29  NG/ML  SUFFICIENCY:         NG/ML    THIS ASSAY ACCURATELY QUANTIFIES THE SUM OF  VITAMIN D3, 25-HYDROXY AND VIT D2,25-HYDROXY.     04/26/2021 12:41 PM 13 (A) ng/mL Final     Comment:     .  DEFICIENCY:         < 20   NG/ML  INSUFFICIENCY:      20-29  NG/ML  SUFFICIENCY:         NG/ML    THIS ASSAY ACCURATELY QUANTIFIES THE SUM OF  VITAMIN D3, 25-HYDROXY AND VIT D2,25-HYDROXY.       Albumin/Creatine Ratio   Date/Time Value Ref Range Status   01/12/2024 03:21 PM 95.8 (H) <30.0 ug/mg Creat Final   08/21/2023 12:50 .0 (H) 0.0 - 30.0 ug/mg crt Final   05/25/2023 10:37 .1 (H) 0.0 - 30.0 ug/mg crt Final   04/26/2021 12:41 PM 59.9 (H) 0.0 - 30.0 ug/mg crt Final     Hemoglobin   Date/Time Value Ref Range Status   01/15/2024 02:13 PM 12.4 (L) 13.5 - 17.5 g/dL Final   07/06/2023 05:57 AM 11.9 (L) 13.5 - 16.5 GM/DL Final   07/05/2023 06:12 AM 12.1 (L) 13.5 - 16.5 GM/DL Final   07/03/2023 10:02 PM 13.0 (L) 13.5 - 16.5 GM/DL Final     73-year-old with history of solitary kidney, hypertension,  nephrolithiasis, prior episodes of acute kidney injury now with chronic kidney disease here for follow-up for the first time since February 2022     #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 Advised to increase fluid intake when he has diarrhea. Tolerating Farxiga well, continue for now, sick day rule reviewed.  Advised to call PCP and GI for further evaluation of GI symptoms     #Bone mineral metabolism: Calcium 8.6, albumin 3.6, ct vit D 5000 U daily     #Hypertension: Goal blood pressure less than 120/80, at goal. Continue losartan 50 mg once a day and metoprolol 25 mg once a day.   RTC: 3-4 mo

## 2024-02-13 LAB
ATRIAL RATE: 64 BPM
P AXIS: 82 DEGREES
P OFFSET: 209 MS
P ONSET: 155 MS
PR INTERVAL: 140 MS
Q ONSET: 225 MS
QRS COUNT: 11 BEATS
QRS DURATION: 82 MS
QT INTERVAL: 410 MS
QTC CALCULATION(BAZETT): 422 MS
QTC FREDERICIA: 418 MS
R AXIS: 82 DEGREES
T AXIS: 91 DEGREES
T OFFSET: 430 MS
VENTRICULAR RATE: 64 BPM

## 2024-02-14 ENCOUNTER — OFFICE VISIT (OUTPATIENT)
Dept: GASTROENTEROLOGY | Facility: CLINIC | Age: 74
End: 2024-02-14
Payer: COMMERCIAL

## 2024-02-14 VITALS — HEART RATE: 67 BPM | OXYGEN SATURATION: 99 % | HEIGHT: 67 IN | WEIGHT: 122.12 LBS | BODY MASS INDEX: 19.17 KG/M2

## 2024-02-14 DIAGNOSIS — R19.7 DIARRHEA, UNSPECIFIED TYPE: ICD-10-CM

## 2024-02-14 DIAGNOSIS — K59.00 CONSTIPATION, UNSPECIFIED CONSTIPATION TYPE: Primary | ICD-10-CM

## 2024-02-14 PROCEDURE — 1159F MED LIST DOCD IN RCRD: CPT | Performed by: INTERNAL MEDICINE

## 2024-02-14 PROCEDURE — 3008F BODY MASS INDEX DOCD: CPT | Performed by: INTERNAL MEDICINE

## 2024-02-14 PROCEDURE — 99213 OFFICE O/P EST LOW 20 MIN: CPT | Performed by: INTERNAL MEDICINE

## 2024-02-14 PROCEDURE — 1126F AMNT PAIN NOTED NONE PRSNT: CPT | Performed by: INTERNAL MEDICINE

## 2024-02-14 PROCEDURE — 1157F ADVNC CARE PLAN IN RCRD: CPT | Performed by: INTERNAL MEDICINE

## 2024-02-14 PROCEDURE — 3060F POS MICROALBUMINURIA REV: CPT | Performed by: INTERNAL MEDICINE

## 2024-02-14 PROCEDURE — 4010F ACE/ARB THERAPY RXD/TAKEN: CPT | Performed by: INTERNAL MEDICINE

## 2024-02-14 PROCEDURE — 1160F RVW MEDS BY RX/DR IN RCRD: CPT | Performed by: INTERNAL MEDICINE

## 2024-02-14 ASSESSMENT — ENCOUNTER SYMPTOMS
UNEXPECTED WEIGHT CHANGE: 0
FEVER: 0
COUGH: 1
CONFUSION: 0
DIZZINESS: 0
ROS GI COMMENTS: AS PER HPI
FATIGUE: 0
ADENOPATHY: 0
DYSURIA: 0
ARTHRALGIAS: 0
WEAKNESS: 0
BRUISES/BLEEDS EASILY: 0
CHILLS: 0
MYALGIAS: 0
NERVOUS/ANXIOUS: 0
SORE THROAT: 0
SHORTNESS OF BREATH: 1

## 2024-02-14 NOTE — PATIENT INSTRUCTIONS
I would start by recommending a fiber supplement - any variation is fine, whether it is gummies, pills, powder, or anything.  This should help to normalize the bowel movements.    I think the abdominal pain is due to muscle cramping.

## 2024-02-14 NOTE — PROGRESS NOTES
Assessment/Plan    Charly Cornejo is a 73 y.o. male who presents to GI clinic for follow up for constipation and diarrhea.    Diarrhea  Having episodes of diarrhea, also episodes of constipation and some normal bowel movements.  He had colonoscopy less than 2 years ago (June 2022) with a few polyps, and we did stool studies at his last visit that were normal, without evidence of EPI or infections.  He admits that his diet is very low in fiber, so we discussed adding a fiber supplement to help improve and normalize his bowel movements.  Hopefully fiber would help decrease both episodes of diarrhea and constipation.  If this doesn't help, we could consider further imaging or could treat this as constipation with overflow diarrhea (based on CT scan last year that showed significant constipation).  Discussed the importance of taking a fiber supplement every day, not just intermittently.    Constipation  See A/P for diarrhea.     He also has occasional left-sided abdominal pain that is positional and superficial, which sounds to me like a muscle cramp and not an underlying GI issue.        Subjective     History of Present Illness   Charly Cornejo is a 73 y.o. male with PMHx of EtOH abuse, TOB abuse, HTN, HLD, CKD 3, COPD with chronic hypoxic respiratory failure on oxygen, DM2 with neuropathy, h/o RCC s/p left nephrectomy who presents to GI clinic for follow up.  Last seen around a year ago (Feb 2023) for evaluation of anemia, although workup did not suggest iron deficiency and he had had recent EGD/colonoscopy, so no further workup was recommended.  He was having some loose stools so I recommended stool studies which were normal (elastase, fecal fat, pathogen PCR).  Was in ER for symptoms in January  Also was in ER in July 2023 with abdominal pain, CT showed copious stool in the colon, also evidence of chronic pancreatitis and possibly acute pancreatitis  A month ago had a period of 5 hours wehre he had diarrhea every  "30 miutes  Will take Imdoium which helps, then won't have BM for 2-3 days  Can also have normal stools - bowels can be all over the place  Having some abdominal pain occasionally in LLQ  Denies EtOH, drugs  Having lots of gas, can pass gas involuntarily  Doesn't eat spicy foods, mainly \"meat and potato\" adrianne - doesn't think he gets much fiber    Past history:  Main concern today is very recent onset of increasing frequency of stools and abdominal discomfort  Having frequent stools today  Stomach started hurting him yesterday - like an ache  No blood in stools or overt GI bleeding  Has lost around 3 pounds - no major weight loss  Doesn't eat as much as he used to, had teeth removed, waiting for his dentures  Has times when he gets constipated and other times when he has 1-2 BMs per day, soft and formed  Has lots of bowel gas, difficult to control or hold in, no foul smell  Recent CBC with very mild macrocytic anemia ()  Iron studies from 2021 without evidence of iron deficiency (ferritin >200, normal iron panel)  Colonoscopy 6/2022 ([facility]) for anemia - a few adenomas, diverticulosis, external hemorrhoids  EGD 7/2020 ([facility]) for anemia, melena with candidal esophagitis  EGD 6/2020 ([facility]) with LA-C esophagitis    Review of Systems  Review of Systems   Constitutional:  Negative for chills, fatigue, fever and unexpected weight change.   HENT:  Negative for sore throat.    Eyes:  Negative for visual disturbance.   Respiratory:  Positive for cough and shortness of breath.    Cardiovascular:  Negative for chest pain.   Gastrointestinal:         As per HPI   Genitourinary:  Negative for dysuria.   Musculoskeletal:  Negative for arthralgias and myalgias.   Skin:  Negative for rash.   Neurological:  Negative for dizziness, syncope and weakness.   Hematological:  Negative for adenopathy. Does not bruise/bleed easily.   Psychiatric/Behavioral:  Negative for confusion. The patient is not nervous/anxious.  " "      Allergies  Allergies   Allergen Reactions    Aspirin Nausea And Vomiting and Other     GI Upset    Azithromycin Rash       Medications  Current Outpatient Medications   Medication Instructions    acetaminophen (TYLENOL) 650 mg, oral, Every 6 hours PRN    albuterol 90 mcg/actuation inhaler 2 puffs, inhalation, Every 4 hours PRN    allopurinol (ZYLOPRIM) 100 mg, oral, Daily RT    amitriptyline (Elavil) 10 mg tablet Take 1 tablet (10 mg) by mouth as needed at bedtime.    amLODIPine (NORVASC) 10 mg, oral, Daily RT    budesonide-glycopyr-formoterol (Breztri Aerosphere) 160-9-4.8 mcg/actuation HFA aerosol inhaler 2 puffs, inhalation, 2 times daily    calcitriol (ROCALTROL) 0.25 mcg, oral, Daily    cholecalciferol (Vitamin D-3) 125 MCG (5000 UT) capsule 1 capsule, oral, Daily    docusate sodium (Colace) 100 mg capsule 1 capsule, oral, 2 times daily PRN    Farxiga 10 mg, oral, Daily before breakfast    ferrous sulfate 325 (65 Fe) MG tablet 1 tablet, oral    finasteride (PROSCAR) 5 mg, oral, Daily    Fluarix Quad [id number], PF, syringe     folic acid (Folvite) 1 mg tablet 1 tablet, oral, Daily    gabapentin (NEURONTIN) 200 mg, oral, 3 times daily    ipratropium-albuteroL (Duo-Neb) 0.5-2.5 mg/3 mL nebulizer solution INHALE THE CONTENTS OF 1   VIAL VIA NEBULIZER 4 TIMES A DAY    losartan (COZAAR) 50 mg, oral, Daily    metoprolol succinate XL (TOPROL-XL) 25 mg, oral, Daily    multivitamin with iron (pediatric multivitamin-iron) tablet chewable split tablet 1 tablet, oral, Daily    omeprazole (PriLOSEC) 20 mg DR capsule 1 capsule, oral    oxygen (O2) therapy 4 Liter O2 - CONTINUOUS (route: Oxygen)    polyethylene glycol (GLYCOLAX, MIRALAX) 17 g, oral, Daily PRN    rosuvastatin (CRESTOR) 20 mg, oral, Nightly    tadalafil (CIALIS) 5 mg, oral, Daily    tamsulosin (FLOMAX) 0.4 mg, oral, Daily        Objective     Visit Vitals  Pulse 67   Ht 1.702 m (5' 7\")   Wt 55.4 kg (122 lb 1.9 oz)   SpO2 99%   BMI 19.13 kg/m²   Smoking " Status Every Day   BSA 1.62 m²       Physical Exam  Constitutional:       Appearance: Normal appearance.   Eyes:      Extraocular Movements: Extraocular movements intact.   Pulmonary:      Comments: Nasal cannula in place with portable oxygen  Abdominal:      General: Bowel sounds are normal. There is no distension.      Palpations: Abdomen is soft.      Tenderness: There is no abdominal tenderness. There is no guarding or rebound.   Skin:     General: Skin is warm and dry.   Neurological:      General: No focal deficit present.      Mental Status: He is alert.   Psychiatric:         Mood and Affect: Mood normal.         Behavior: Behavior normal.           Lab Results   Component Value Date    WBC 3.8 (L) 01/15/2024    WBC 4.1 (L) 07/06/2023    HGB 12.4 (L) 01/15/2024    HGB 11.9 (L) 07/06/2023    HCT 38.6 (L) 01/15/2024    HCT 34.6 (L) 07/06/2023     (H) 01/15/2024    MCV 99.4 07/06/2023     01/15/2024     07/06/2023     Lab Results   Component Value Date     01/15/2024     01/12/2024    K 4.1 01/15/2024    K 4.1 01/12/2024     01/15/2024     01/12/2024    CO2 25 01/15/2024    CO2 27 01/12/2024    BUN 33 (H) 01/15/2024    BUN 36 (H) 01/12/2024    CREATININE 2.62 (H) 01/15/2024    CREATININE 2.89 (H) 01/12/2024    CALCIUM 8.6 01/15/2024    CALCIUM 10.0 01/12/2024    PROT 7.0 01/15/2024    PROT 6.0 07/05/2023    BILITOT 0.7 01/15/2024    BILITOT 0.7 07/05/2023    ALKPHOS 74 01/15/2024    ALKPHOS 61 07/05/2023    ALT 49 01/15/2024    ALT 20 07/05/2023    AST 41 (H) 01/15/2024    AST 13 07/05/2023    GLUCOSE 89 01/15/2024    GLUCOSE 141 (H) 01/12/2024       Recent Imaging    CT A/P (noncontrast) 7/4/23:    FINDINGS:  Lung bases: Small pericardial effusion has improved since prior. Bibasilar  emphysematous changes and minimal atelectatic changes are again seen.  Liver: Normal in size without focal lesions.  Gallbladder: Cholelithiasis again seen.  Spleen: Normal in size  without focal lesions.  Pancreas: Innumerable pancreatic calcifications are again seen consistent  with chronic pancreatitis. Additionally there is mild peripancreatic fat  stranding suggestive of superimposed acute pancreatitis.  Adrenal glands: No focal lesions.  Solitary right kidney is again seen, with a small cyst superiorly. There are  no right renal or ureteral stones or hydronephrosis.  Pelvic reproductive organs: Unremarkable.  Copious amount of fecal material is noted, without bowel obstruction. There  are innumerable colonic diverticula, however there is no acute  diverticulitis.  The appendix is seen and appears normal.  There is no free air or free fluid in the abdomen and pelvis.  The aorta is unremarkable.  IMPRESSION:  1. Acute on chronic pancreatitis.  2. Cholelithiasis again seen.  3. Chronic constipation. Colonic diverticulosis without acute diverticulitis.  4. Solitary right kidney again noted.      XR chest 1 view    Result Date: 1/15/2024  STUDY: Chest Radiograph;  1/15/2024 at 12:48 PM. INDICATION: Chest pain. COMPARISON: XR chest 7/3/2023, 5/6/2023,10/13/2022. ACCESSION NUMBER(S): [id number] ORDERING CLINICIAN: REJI SOLO TECHNIQUE:  Frontal chest was obtained at 12:48 hours (two images). FINDINGS: There is hyperinflation of the lung fields consistent with chronic obstructive pulmonary disease. The heart is not enlarged. There is no evidence of acute pulmonary infiltrate or pleural effusion.    FINDINGS CONSISTENT WITH COPD. NO EVIDENCE OF ACUTE CARDIOPULMONARY DISEASE. NO SIGNIFICANT INTERVAL CHANGE WHEN COMPARED TO THE PRIOR STUDY.. ___ Signed by MD Kin Campuzano MD            DISCHARGE

## 2024-02-14 NOTE — ASSESSMENT & PLAN NOTE
Having episodes of diarrhea, also episodes of constipation and some normal bowel movements.  He had colonoscopy less than 2 years ago (June 2022) with a few polyps, and we did stool studies at his last visit that were normal, without evidence of EPI or infections.  He admits that his diet is very low in fiber, so we discussed adding a fiber supplement to help improve and normalize his bowel movements.  Hopefully fiber would help decrease both episodes of diarrhea and constipation.  If this doesn't help, we could consider further imaging or could treat this as constipation with overflow diarrhea (based on CT scan last year that showed significant constipation).  Discussed the importance of taking a fiber supplement every day, not just intermittently.

## 2024-03-04 DIAGNOSIS — F10.11 HISTORY OF ALCOHOL ABUSE: Primary | ICD-10-CM

## 2024-03-04 RX ORDER — FOLIC ACID 1 MG/1
1 TABLET ORAL DAILY
Qty: 90 TABLET | Refills: 1 | Status: SHIPPED | OUTPATIENT
Start: 2024-03-04

## 2024-04-25 DIAGNOSIS — G62.9 NEUROPATHY: ICD-10-CM

## 2024-04-25 RX ORDER — GABAPENTIN 100 MG/1
200 CAPSULE ORAL 3 TIMES DAILY
Qty: 540 CAPSULE | Refills: 0 | Status: SHIPPED | OUTPATIENT
Start: 2024-04-25

## 2024-05-09 ENCOUNTER — OFFICE VISIT (OUTPATIENT)
Dept: PRIMARY CARE | Facility: CLINIC | Age: 74
End: 2024-05-09
Payer: COMMERCIAL

## 2024-05-09 ENCOUNTER — APPOINTMENT (OUTPATIENT)
Dept: PRIMARY CARE | Facility: CLINIC | Age: 74
End: 2024-05-09
Payer: COMMERCIAL

## 2024-05-09 VITALS
WEIGHT: 117.4 LBS | HEIGHT: 67 IN | SYSTOLIC BLOOD PRESSURE: 125 MMHG | DIASTOLIC BLOOD PRESSURE: 68 MMHG | OXYGEN SATURATION: 96 % | BODY MASS INDEX: 18.43 KG/M2

## 2024-05-09 DIAGNOSIS — E78.00 HYPERCHOLESTEREMIA: ICD-10-CM

## 2024-05-09 DIAGNOSIS — D69.6 THROMBOCYTOPENIA (CMS-HCC): ICD-10-CM

## 2024-05-09 DIAGNOSIS — N18.4 STAGE 4 CHRONIC KIDNEY DISEASE (MULTI): ICD-10-CM

## 2024-05-09 DIAGNOSIS — E44.1 MILD PROTEIN-CALORIE MALNUTRITION (MULTI): ICD-10-CM

## 2024-05-09 DIAGNOSIS — Z00.00 MEDICARE ANNUAL WELLNESS VISIT, SUBSEQUENT: ICD-10-CM

## 2024-05-09 DIAGNOSIS — M1A.9XX0 CHRONIC GOUT WITHOUT TOPHUS, UNSPECIFIED CAUSE, UNSPECIFIED SITE: ICD-10-CM

## 2024-05-09 DIAGNOSIS — K86.0 ALCOHOL-INDUCED CHRONIC PANCREATITIS (MULTI): ICD-10-CM

## 2024-05-09 DIAGNOSIS — C64.2 RENAL CELL CARCINOMA OF LEFT KIDNEY (MULTI): ICD-10-CM

## 2024-05-09 DIAGNOSIS — I73.9 PERIPHERAL VASCULAR DISEASE (CMS-HCC): ICD-10-CM

## 2024-05-09 DIAGNOSIS — I10 BENIGN ESSENTIAL HYPERTENSION: ICD-10-CM

## 2024-05-09 DIAGNOSIS — J96.11 CHRONIC RESPIRATORY FAILURE WITH HYPOXIA (MULTI): ICD-10-CM

## 2024-05-09 DIAGNOSIS — E21.3 HYPERPARATHYROIDISM (MULTI): ICD-10-CM

## 2024-05-09 DIAGNOSIS — E55.9 VITAMIN D DEFICIENCY: Primary | ICD-10-CM

## 2024-05-09 DIAGNOSIS — Z00.00 WELLNESS EXAMINATION: ICD-10-CM

## 2024-05-09 PROCEDURE — 99397 PER PM REEVAL EST PAT 65+ YR: CPT | Performed by: STUDENT IN AN ORGANIZED HEALTH CARE EDUCATION/TRAINING PROGRAM

## 2024-05-09 PROCEDURE — G0439 PPPS, SUBSEQ VISIT: HCPCS | Performed by: STUDENT IN AN ORGANIZED HEALTH CARE EDUCATION/TRAINING PROGRAM

## 2024-05-09 PROCEDURE — 1126F AMNT PAIN NOTED NONE PRSNT: CPT | Performed by: STUDENT IN AN ORGANIZED HEALTH CARE EDUCATION/TRAINING PROGRAM

## 2024-05-09 PROCEDURE — 1160F RVW MEDS BY RX/DR IN RCRD: CPT | Performed by: STUDENT IN AN ORGANIZED HEALTH CARE EDUCATION/TRAINING PROGRAM

## 2024-05-09 PROCEDURE — 3078F DIAST BP <80 MM HG: CPT | Performed by: STUDENT IN AN ORGANIZED HEALTH CARE EDUCATION/TRAINING PROGRAM

## 2024-05-09 PROCEDURE — 1159F MED LIST DOCD IN RCRD: CPT | Performed by: STUDENT IN AN ORGANIZED HEALTH CARE EDUCATION/TRAINING PROGRAM

## 2024-05-09 PROCEDURE — 1157F ADVNC CARE PLAN IN RCRD: CPT | Performed by: STUDENT IN AN ORGANIZED HEALTH CARE EDUCATION/TRAINING PROGRAM

## 2024-05-09 PROCEDURE — 4010F ACE/ARB THERAPY RXD/TAKEN: CPT | Performed by: STUDENT IN AN ORGANIZED HEALTH CARE EDUCATION/TRAINING PROGRAM

## 2024-05-09 PROCEDURE — 3074F SYST BP LT 130 MM HG: CPT | Performed by: STUDENT IN AN ORGANIZED HEALTH CARE EDUCATION/TRAINING PROGRAM

## 2024-05-09 PROCEDURE — 3060F POS MICROALBUMINURIA REV: CPT | Performed by: STUDENT IN AN ORGANIZED HEALTH CARE EDUCATION/TRAINING PROGRAM

## 2024-05-09 PROCEDURE — 1170F FXNL STATUS ASSESSED: CPT | Performed by: STUDENT IN AN ORGANIZED HEALTH CARE EDUCATION/TRAINING PROGRAM

## 2024-05-09 PROCEDURE — 99214 OFFICE O/P EST MOD 30 MIN: CPT | Performed by: STUDENT IN AN ORGANIZED HEALTH CARE EDUCATION/TRAINING PROGRAM

## 2024-05-09 ASSESSMENT — ACTIVITIES OF DAILY LIVING (ADL)
DRESSING: INDEPENDENT
DOING_HOUSEWORK: NEEDS ASSISTANCE
MANAGING_FINANCES: INDEPENDENT
GROCERY_SHOPPING: INDEPENDENT
BATHING: INDEPENDENT
TAKING_MEDICATION: INDEPENDENT

## 2024-05-09 ASSESSMENT — PATIENT HEALTH QUESTIONNAIRE - PHQ9
SUM OF ALL RESPONSES TO PHQ9 QUESTIONS 1 AND 2: 0
1. LITTLE INTEREST OR PLEASURE IN DOING THINGS: NOT AT ALL
2. FEELING DOWN, DEPRESSED OR HOPELESS: NOT AT ALL

## 2024-05-09 ASSESSMENT — PAIN SCALES - GENERAL: PAINLEVEL: 0-NO PAIN

## 2024-05-09 ASSESSMENT — ENCOUNTER SYMPTOMS
OCCASIONAL FEELINGS OF UNSTEADINESS: 0
LOSS OF SENSATION IN FEET: 0
DEPRESSION: 0

## 2024-05-09 ASSESSMENT — COLUMBIA-SUICIDE SEVERITY RATING SCALE - C-SSRS
2. HAVE YOU ACTUALLY HAD ANY THOUGHTS OF KILLING YOURSELF?: NO
6. HAVE YOU EVER DONE ANYTHING, STARTED TO DO ANYTHING, OR PREPARED TO DO ANYTHING TO END YOUR LIFE?: NO
1. IN THE PAST MONTH, HAVE YOU WISHED YOU WERE DEAD OR WISHED YOU COULD GO TO SLEEP AND NOT WAKE UP?: NO

## 2024-05-09 NOTE — PROGRESS NOTES
"Subjective   Reason for Visit: Charly Cornejo is an 73 y.o. male here for a Medicare Wellness visit.          Reviewed all medications by prescribing practitioner or clinical pharmacist (such as prescriptions, OTCs, herbal therapies and supplements) and documented in the medical record.    HPI    Patient Care Team:  Christopher D'Amico, DO as PCP - General (Family Medicine)  Ray Santos DO as PCP - [facility] [employer] PCP     Review of Systems    Objective   Vitals:  /68   Ht 1.702 m (5' 7\")   Wt 53.3 kg (117 lb 6.4 oz)   SpO2 96% Comment: 4 liters continously  BMI 18.39 kg/m²       Physical Exam    Assessment/Plan   Problem List Items Addressed This Visit    None           HPI: 73-year-old male presenting for establishment of care, follow-up on multiple chronic conditions, Medicare annual wellness exam/CPE.    HTN  Stable, tolerates current regimen well.    HLD  Stable, tolerating current regimen well.  Following with cardiology.    GERD  Stable, follows with GI    BPH, ED  Stable, tolerating current regimen well.    CKD IV, history of renal cell carcinoma  Status post left nephrectomy, stable, considered in remission, follows with nephrology.    Gout  Stable, tolerating current regimen well.    COPD, chronic respiratory failure  Stable, on oxygen at baseline, still smoking.  Following with pulmonology.    SocHx:   - Smoking: Current everyday smoker, approximately 55 years    12 point ROS reviewed and negative other than as stated in HPI      General: Alert, oriented, pleasant, in no acute distress  HEENT:      Head: normocephalic, atraumatic;      eyes: EOMI, no scleral icterus;   Neck: soft, supple, non-tender, no masses appreciated  CV: Heart with regular rate and rhythm, normal S1/S2, no murmurs  Lungs: CTAB without wheezing, rhonchi or rales; good respiratory effort, no increased work of breathing  Abdomen: soft, non-tender, non-distended, no masses appreciated  Extremities: no " edema, no cyanosis  Neuro: Cranial nerves grossly intact; alert and oriented  Psych: Appropriate mood and affect    #HM  -Most [facility] and [facility]  -Vaccines:       Flu: Out of season      Shingrix: Recommended, advised to go to local pharmacy      Pneumococcal: UTD      Tdap: Recommended, advised to go to local pharmacy  -Lung cancer screening with low-dose CT: Current smoker, 55 years, getting CTs through pulmonology, last one in 08/20/2023  -Colonoscopy:  2022, no follow-up plan established, still seeing GI  -AAA screening: no aneurysm on CT in 2022    #HTN  -At goal in office  - Continue amlodipine 10 mg daily, losartan 50 mg daily, metoprolol succinate 25 mg daily    #HLD #PVD  -Continue rosuvastatin 20 mg daily  - Repeat lipid panel    #GERD  - Continue omeprazole 20 mg daily    #BPH   -Continue tamsulosin 0.4 mg daily, finasteride 5 mg daily    #CKD IV #history of renal cell carcinoma status post left kidney nephrectomy  -Cancer considered in remission  -Continue Farxiga 10 mg daily  - Continue to follow with nephrology    #Gout  -Continue allopurinol 100 mg daily    #COPD #Chronic respiratory failure  -Baseline O2  -Continue Breztri twice daily  -Continue with pulmonology    #History of alcohol induced pancreatitis  - Sober 5 years or so    #Thrombocytopenia  -Normalized on last lab    Most labs done recently and reviewed [facility] last month, will defer blood work at this time    F/U 6 months, sooner if indicated    Chris D'Amico,

## 2024-05-11 NOTE — PATIENT INSTRUCTIONS
Surgeon: Laury Rubi MD                   [phone]      Patient name: Charly Cornejo    Date of visit: 11/17/23      right eye    Prednisolone acetate (pink or white cap) - 4 times a day for 1 week, 3 times a day for 1 week, 2 times a day for 1 week, once a day for 1 week, then stop    Ofloxacin (tan cap) - 4 times a day for 3 more days, then stop    Ketorolac (gray cap) - 4 times a day for 1 more week, then stop      No heavy lifting over 15-20 lbs, try not to get eye wet, no eye rubbing. You may stop wearing the eye shield at night. Please continue wearing glasses or sunglasses during the day to protect your eyes. Please call immediately if you develop any redness, pain, decreased vision, flashes, floaters.     Surgical Scheduler (during office hours) - [place]: [phone]    [FreeTextEntry1] : 7 year boy found to be rapid strep positive. Complete 10 days of antibiotics. Use antipyretics as needed. Return for follow up in 2 weeks. After being on antibiotics for atleast 24 hours patient less likely to spread infection.

## 2024-05-13 DIAGNOSIS — E78.00 HYPERCHOLESTEREMIA: ICD-10-CM

## 2024-05-13 DIAGNOSIS — I10 BENIGN ESSENTIAL HYPERTENSION: ICD-10-CM

## 2024-05-13 RX ORDER — METOPROLOL SUCCINATE 25 MG/1
25 TABLET, EXTENDED RELEASE ORAL DAILY
Qty: 90 TABLET | Refills: 1 | Status: SHIPPED | OUTPATIENT
Start: 2024-05-13 | End: 2024-06-10 | Stop reason: SDUPTHER

## 2024-05-13 RX ORDER — LOSARTAN POTASSIUM 50 MG/1
50 TABLET ORAL DAILY
Qty: 90 TABLET | Refills: 1 | Status: SHIPPED | OUTPATIENT
Start: 2024-05-13 | End: 2024-06-10 | Stop reason: SDUPTHER

## 2024-05-13 RX ORDER — ROSUVASTATIN CALCIUM 20 MG/1
20 TABLET, COATED ORAL NIGHTLY
Qty: 90 TABLET | Refills: 1 | Status: SHIPPED | OUTPATIENT
Start: 2024-05-13 | End: 2024-06-10 | Stop reason: SDUPTHER

## 2024-06-03 DIAGNOSIS — E21.3 HYPERPARATHYROIDISM (MULTI): ICD-10-CM

## 2024-06-03 RX ORDER — CALCITRIOL 0.25 UG/1
0.25 CAPSULE ORAL DAILY
Qty: 90 CAPSULE | Refills: 1 | Status: SHIPPED | OUTPATIENT
Start: 2024-06-03

## 2024-06-10 ENCOUNTER — OFFICE VISIT (OUTPATIENT)
Dept: CARDIOLOGY | Facility: CLINIC | Age: 74
End: 2024-06-10
Payer: COMMERCIAL

## 2024-06-10 VITALS
HEIGHT: 67 IN | HEART RATE: 71 BPM | SYSTOLIC BLOOD PRESSURE: 116 MMHG | WEIGHT: 112.8 LBS | DIASTOLIC BLOOD PRESSURE: 74 MMHG | BODY MASS INDEX: 17.71 KG/M2 | OXYGEN SATURATION: 98 %

## 2024-06-10 DIAGNOSIS — E78.00 HYPERCHOLESTEREMIA: ICD-10-CM

## 2024-06-10 DIAGNOSIS — F17.200 TOBACCO DEPENDENCE SYNDROME: ICD-10-CM

## 2024-06-10 DIAGNOSIS — E78.2 MIXED HYPERLIPIDEMIA: ICD-10-CM

## 2024-06-10 DIAGNOSIS — I25.10 CORONARY ARTERY CALCIFICATION: ICD-10-CM

## 2024-06-10 DIAGNOSIS — I10 BENIGN ESSENTIAL HYPERTENSION: ICD-10-CM

## 2024-06-10 DIAGNOSIS — R94.31 ABNORMAL EKG: Primary | ICD-10-CM

## 2024-06-10 DIAGNOSIS — Z99.81 DEPENDENCE ON SUPPLEMENTAL OXYGEN: ICD-10-CM

## 2024-06-10 DIAGNOSIS — I25.84 CORONARY ARTERY CALCIFICATION: ICD-10-CM

## 2024-06-10 DIAGNOSIS — J96.11 CHRONIC RESPIRATORY FAILURE WITH HYPOXIA (MULTI): ICD-10-CM

## 2024-06-10 PROCEDURE — 99214 OFFICE O/P EST MOD 30 MIN: CPT | Performed by: INTERNAL MEDICINE

## 2024-06-10 PROCEDURE — 4010F ACE/ARB THERAPY RXD/TAKEN: CPT | Performed by: INTERNAL MEDICINE

## 2024-06-10 PROCEDURE — 3060F POS MICROALBUMINURIA REV: CPT | Performed by: INTERNAL MEDICINE

## 2024-06-10 PROCEDURE — 93005 ELECTROCARDIOGRAM TRACING: CPT | Performed by: INTERNAL MEDICINE

## 2024-06-10 PROCEDURE — 1157F ADVNC CARE PLAN IN RCRD: CPT | Performed by: INTERNAL MEDICINE

## 2024-06-10 PROCEDURE — 1126F AMNT PAIN NOTED NONE PRSNT: CPT | Performed by: INTERNAL MEDICINE

## 2024-06-10 PROCEDURE — 1160F RVW MEDS BY RX/DR IN RCRD: CPT | Performed by: INTERNAL MEDICINE

## 2024-06-10 PROCEDURE — 1159F MED LIST DOCD IN RCRD: CPT | Performed by: INTERNAL MEDICINE

## 2024-06-10 PROCEDURE — 3078F DIAST BP <80 MM HG: CPT | Performed by: INTERNAL MEDICINE

## 2024-06-10 PROCEDURE — 3074F SYST BP LT 130 MM HG: CPT | Performed by: INTERNAL MEDICINE

## 2024-06-10 RX ORDER — METOPROLOL SUCCINATE 25 MG/1
25 TABLET, EXTENDED RELEASE ORAL DAILY
Qty: 90 TABLET | Refills: 1 | Status: SHIPPED | OUTPATIENT
Start: 2024-06-10

## 2024-06-10 RX ORDER — LOSARTAN POTASSIUM 50 MG/1
50 TABLET ORAL DAILY
Qty: 90 TABLET | Refills: 1 | Status: SHIPPED | OUTPATIENT
Start: 2024-06-10

## 2024-06-10 RX ORDER — ROSUVASTATIN CALCIUM 20 MG/1
20 TABLET, COATED ORAL NIGHTLY
Qty: 90 TABLET | Refills: 1 | Status: SHIPPED | OUTPATIENT
Start: 2024-06-10

## 2024-06-10 ASSESSMENT — ENCOUNTER SYMPTOMS
DEPRESSION: 0
LOSS OF SENSATION IN FEET: 0
OCCASIONAL FEELINGS OF UNSTEADINESS: 1

## 2024-06-10 ASSESSMENT — PAIN SCALES - GENERAL: PAINLEVEL: 0-NO PAIN

## 2024-06-10 NOTE — ASSESSMENT & PLAN NOTE
Continue supportive care.  --Liberalize diet to improve weight gain in the setting of COPD  --COPD diet/Mediterranean diet information provided to support appropriate healthy choices

## 2024-06-10 NOTE — PROGRESS NOTES
Chief Complaint:   Hypertension, Hyperlipidemia, Coronary Artery Disease, and Follow-up (4 month)     History Of Present Illness:    Charly Cornejo is a 73 y.o. male presenting with year old -American Male who is pertinent medical history notable for chronic alcohol use disorder, alcohol-induced pancreatitis, essential hypertension, chronic liver disease, COPD, left renal  mass with imaging consistent for renal cell carcinoma, pancreatic pseudocyst, tobacco abuse, hyperparathyroidism S/P parathyroidectomy who presents to cardiology clinic for follow up     He had a very complicated course from June through July 2020. Please see detailed encounters as well as discharge summaries found within the EMR. Since his recovery from his complicated course, he has been doing well from             able to walk and participate in his activities of daily living. He lives in a one floor apartment that he is able to managed by themselves. He does not endorse chest pain, heaviness, pressure. He has some moderate exertional shortness of breath which he reports is related to COPD. It is essentially unchanged. He continues to smoke, down to 10 cigarettes per day. He has completed pharmacological stress testing which was negative for inducible ischemia. He underwent open left radical nephrectomy on 2/15/2021. There, his intraoperative course and recovery was complicated by COPD exacerbation requiring intubation, sedation with interval development of alert large right pneumothorax drained by pigtail chest tube. He then underwent VATS for blebectomy on 02/22/2021 and ultimately was discharged home.      Since he was last seen, he unfortunately had additional pulmonary complications. He was admitted 9/24 -->10/7 for shortness of breath related to Pneumothorax. He was treated with CT but had continued air leak prompting OR for management. 9/27 he had a Bronchoscopy, BAL, Left VATS lower lobe wedge resection, lower lobe blebectomy,  upper lobe wedge, mechanical pleurodesis - complete but had persistent air leak. DVT scan done 10/4 positive for RUE DVT (axillary and brachial)--re imaged and seen by [facility] who felt that his represented untreated chronic thrombus and did not recommend anticoagulation due to Cirrhosis Hx. He had repeat Bronchoscopy due to Left Lower Lobe Collapse with thick secretions present.      Since his discharge, he has continued to recover. He feels improved and feels that his breathing is stable to improved as well. He voices no cardiac symptoms at this time. He feels that he has been doing well with his medical issues. He is hopeful to get outside and walk more with better weather.      He returns today (11/29/2022) for follow up. He has been in his usual state of health he has been working to address many of his chronic medical conditions. He has met with general surgery to have a scalp mass addressed and feels good about this. He had no undue complications related to this procedure.. He continues to smoke cigarettes, but is working on reducing this. He denies any cardiovascular complaints. He is planning on getting some dental work done to help address some other issues. He has paperwork that he request be out for his dentist.     Today ( 5/2/2023) he returns for follow up. He has finally gotten his dentures and is back to eating. meals. Feels much better about this. He has followed up with multiple specialist since he was last sen. He has been started on Farxiga for Kidney Disease and is tolering this well. He is compliant with medications and his results are doing well. He has not been able to give up smoking,still hovers around 1/2 PPD. Engaging in sexual relations without chest pressure, pain, shortness of breath beyond his baseline. Overall, he offers no significant cardiovascular complaints    2/9/2024 -- He returns for follow up. He has overall been well from a cardiac standpoint. He notes some left sided  "abdominal discomfort hat seemes to alternate between Constipation and Explosive Diarrhea. He reports considerable flatus during these episodes. States that he has been having some issues regarding staying well-hydrated as well as keeping his weight up..  He does note some continued shortness of breath but does not seem to have any change.    6/10/2024 --he returns for scheduled follow-up.  He overall has done well from a cardiovascular standpoint.  He continues to have issues with bloat.  He is surprised and shocked to see that his weight is plummeted as far as it has.  This is very distressing for him.  He has been trying to eat better to help support his weight.  [facility] was overall [facility] with the experience, but wants to continue with his current doctors secondary to convenience.             Patient denies chest pain and angina. Pt denies worsening of his baseline shortness of breath, dyspnea on exertion, orthopnea, and paroxysmal nocturnal dyspnea.  He continues on 4 L supplemental oxygen at all times.      Pt denies worsening lower extremity edema. Pt denies palpitations or syncope. No recent falls. No fever or chills. No cough. No change in bowel or bladder habits. No travel. No sick contacts. No recent travel     Past medical history: As above.  Medications: Reviewed.  Allergies: Reviewed.  Social history: Patient reports continued cigarette use. He is currently at one half pack per day. He's been abstinent from alcohol since March 2020. He denies illicit drug use.  Family history: No sudden cardiac death or premature coronary artery disease..     Last Recorded Vitals:  Vitals:    06/10/24 1112   BP: 116/74   Patient Position: Sitting   Pulse: 71   SpO2: 98%   Weight: 51.2 kg (112 lb 12.8 oz)   Height: 1.702 m (5' 7\")         Past Medical History:  He has a past medical history of Acute bronchitis (06/13/2023), Acute urinary retention (06/13/2023), Arthritis, Asteroid hyalosis, " Cataract, COPD (chronic obstructive pulmonary disease) (Multi), Diabetic retinopathy (Multi), Glaucoma suspect of both eyes, Hyperlipidemia, Hypertension, Neuropathy, Other disorders of lung, Personal history of other diseases of the circulatory system (03/14/2017), Personal history of other diseases of the circulatory system, Personal history of other diseases of the respiratory system, and Personal history of other specified conditions.    Past Surgical History:  He has a past surgical history that includes Other surgical history (10/13/2021); Other surgical history (10/22/2021); Hernia repair (10/16/2017); FL guided abdominal paracentesis (07/06/2020); Cataract extraction; Colonoscopy; Upper gastrointestinal endoscopy; Other surgical history; Other surgical history; and Other surgical history.      Social History:  He reports that he has been smoking cigarettes. He has never used smokeless tobacco. He reports that he does not currently use alcohol. He reports that he does not use drugs.    Family History:  Family History   Problem Relation Name Age of Onset    Diabetes Mother      Leukemia Mother      Diabetes Father      Diabetes Brother          Allergies:  Aspirin and Azithromycin    Outpatient Medications:  Current Outpatient Medications   Medication Instructions    acetaminophen (TYLENOL) 650 mg, oral, Every 6 hours PRN    albuterol 90 mcg/actuation inhaler 2 puffs, inhalation, Every 4 hours PRN    allopurinol (ZYLOPRIM) 100 mg, oral, Daily RT    amLODIPine (NORVASC) 10 mg, oral, Daily RT    budesonide-glycopyr-formoterol (Breztri Aerosphere) 160-9-4.8 mcg/actuation HFA aerosol inhaler 2 puffs, inhalation, 2 times daily    calcitriol (ROCALTROL) 0.25 mcg, oral, Daily    cholecalciferol (Vitamin D-3) 125 MCG (5000 UT) capsule 1 capsule, oral, Daily    docusate sodium (Colace) 100 mg capsule 1 capsule, oral, 2 times daily PRN    Farxiga 10 mg TAKE 1 TABLET ONCE DAILY INTHE MORNING. TAKE BEFORE A MEAL     "ferrous sulfate 325 (65 Fe) MG tablet 1 tablet, oral    finasteride (PROSCAR) 5 mg, oral, Daily    folic acid (FOLVITE) 1 mg, oral, Daily    gabapentin (NEURONTIN) 200 mg, oral, 3 times daily    ipratropium-albuteroL (Duo-Neb) 0.5-2.5 mg/3 mL nebulizer solution INHALE THE CONTENTS OF 1   VIAL VIA NEBULIZER 4 TIMES A DAY    losartan (COZAAR) 50 mg, oral, Daily    metoprolol succinate XL (TOPROL-XL) 25 mg, oral, Daily    omeprazole (PriLOSEC) 20 mg DR capsule 1 capsule, oral    oxygen (O2) therapy 4 Liter O2 - CONTINUOUS (route: Oxygen)    polyethylene glycol (GLYCOLAX, MIRALAX) 17 g, oral, Daily PRN    rosuvastatin (CRESTOR) 20 mg, oral, Nightly    tadalafil (CIALIS) 5 mg, oral, Daily    tamsulosin (FLOMAX) 0.4 mg, oral, Daily       Physical Exam:  /74 (Patient Position: Sitting)   Pulse 71   Ht 1.702 m (5' 7\")   Wt 51.2 kg (112 lb 12.8 oz)   SpO2 98% Comment: 4L O2  BMI 17.67 kg/m²    General:  Patient is awake, alert, and oriented.  Patient is in no acute distress.  HEENT:  Pupils equal and reactive.  Normocephalic.  Moist mucosa.    Neck:  No thyromegaly.  Normal Jugular Venous Pressure.  Cardiovascular:  Regular rate and rhythm.  Normal S1 and S2.  1/6 SYBIL.  Pulmonary:  Clear to auscultation bilaterally.  Using supplemental oxygen.  Pursed lip breathing.  Using accessory muscles for breathing.  Tripod breathing positioning  Abdomen:  Soft. Non-tender.   Non-distended.  Positive bowel sounds.  Lower Extremities:  2+ pedal pulses. No LE edema.  Neurologic:  Cranial nerves intact.  No focal deficit.   Skin: Skin warm and dry, normal skin turgor.   Psychiatric: Normal affect.    Last Labs:  CBC -  Lab Results   Component Value Date    WBC 3.8 (L) 01/15/2024    HGB 12.4 (L) 01/15/2024    HCT 38.6 (L) 01/15/2024     (H) 01/15/2024     01/15/2024       CMP -  Lab Results   Component Value Date    CALCIUM 8.6 01/15/2024    PHOS 2.2 (L) 01/12/2024    PROT 7.0 01/15/2024    ALBUMIN 3.6 01/15/2024 " "   AST 41 (H) 01/15/2024    ALT 49 01/15/2024    ALKPHOS 74 01/15/2024    BILITOT 0.7 01/15/2024       LIPID PANEL -   Lab Results   Component Value Date    CHOL 124 01/04/2023    TRIG 49 01/04/2023    HDL 69.6 01/04/2023    CHHDL 1.8 01/04/2023    LDLF 45 01/04/2023    VLDL 10 01/04/2023       RENAL FUNCTION PANEL -   Lab Results   Component Value Date    GLUCOSE 89 01/15/2024     01/15/2024    K 4.1 01/15/2024     01/15/2024    CO2 25 01/15/2024    ANIONGAP 13 01/15/2024    BUN 33 (H) 01/15/2024    CREATININE 2.62 (H) 01/15/2024    GFRMALE 30 (A) 08/21/2023    CALCIUM 8.6 01/15/2024    PHOS 2.2 (L) 01/12/2024    ALBUMIN 3.6 01/15/2024        Lab Results   Component Value Date    BNP 28 01/15/2024    HGBA1C 4.9 05/07/2023       Last Cardiology Tests:  ECG:  ECG 12 lead 01/15/2024 (Preliminary)  In office EKG1/14/2021  -- Normal sinus rhythm at 70 bpm  --T-wave inversions in V4 through V6  -- Rare PAC    Echo:  Echocardiogram 11/2022  1. Left ventricular systolic function is normal with a 55-60% estimated ejection fraction.  2. Spectral Doppler shows an impaired relaxation pattern of left ventricular diastolic filling.  3. The left atrium is moderate to severely dilated.  4. RVSP within normal limits.     Echocardiogram 09/2022  1. Left ventricular systolic function is normal with a 55-60% estimated ejection fraction.  2. Spectral Doppler shows an impaired relaxation pattern of left ventricular diastolic filling.  3. Trace to mild mitral valve regurgitation.      Echocardiogram 06/2020  1. The left ventricular systolic function is low normal with a 50-55% estimated ejection fraction.  2. Spectral Doppler shows an impaired relaxation pattern of left ventricular diastolic filling.  3. RVSP within normal limits     Ejection Fractions:  No results found for: \"EF\"    Cath:  No results found for this or any previous visit from the past 1095 days.      Stress Test:  Nuclear pharmacological stress testing " 1/2021  FINDINGS:  Stress and rest images both demonstrate a normal distribution of  perfusion throughout all LV segments with no sign of ischemia.     TID: 1.03     ECG-gated images demonstrate normal LV size and myocardial  contractility with an LV ejection fraction of 65 % (normal above 45  percent).     IMPRESSION:  1. Normal stress myocardial perfusion imaging in response to  pharmacologic stress.  2. Well-maintained left ventricular function.    Cardiac Imaging:  No results found for this or any previous visit from the past 1095 days.        Lab review: I have personally reviewed the laboratory result(s)   Diagnostic review: I have personally reviewed the result(s) of the EKG and Echocardiogram .   Imaging review: I have  personally reviewed the result(s)     Assessment/Plan     Problem List Items Addressed This Visit       Abnormal EKG - Primary    Overview     Incidentally noted to have T wave inversions in multiple leads         Current Assessment & Plan     No anginal equivalents.  He is significantly limited however secondary to his progressing COPD         Benign essential hypertension    Overview     Currently controlled         Current Assessment & Plan     Medications as of 6/2024 include  --Metoprolol succinate 25 mg daily  --Losartan 50 mg daily  --          Coronary artery calcification    Overview     Coronary artery calcification seen incidentally on noncardiac CT Imaging.   No Formal CT-CAC scoring          Current Assessment & Plan     Continue supportive care.  --Liberalize diet to improve weight gain in the setting of COPD  --COPD diet/Mediterranean diet information provided to support appropriate healthy choices           Hypercholesteremia    Overview     The ASCVD Risk score (Debra BROWN, et al., 2019) failed to calculate for the following reasons:    The valid total cholesterol range is 130 to 320 mg/dL    Lab Results   Component Value Date    CHOL 124 01/04/2023    CHOL 108 03/16/2022     "CHOL 198 01/14/2021     Lab Results   Component Value Date    HDL 69.6 01/04/2023    HDL 54.7 03/16/2022    HDL 54.7 01/14/2021     No results found for: \"LDLCALC\"  Lab Results   Component Value Date    TRIG 49 01/04/2023    TRIG 57 03/16/2022    TRIG 49 11/07/2018     No components found for: \"CHOLHDL\"           Current Assessment & Plan     Stable.    Continue rosuvastatin 20 mg and 81mg ASA.    Follow a Mediterranean diet and exercise as tolerated         Mixed hyperlipidemia       Follow-up 4 months.  Reassess diet, weight gain      Gold Graham, DO  "

## 2024-06-10 NOTE — PATIENT INSTRUCTIONS
It was a pleasure seeing you today. I would like to see you back in clinic in  4  months. You can call my office if questions arise between now and our next visit.     Today, we talked about you're  breathing and your weight loss     You need to try and improve your diet as best as you are able to do   Following the Mediterranean diet means eating less prepackaged foods and instead increasing your intake of whole foods, like:    Fruits.  Vegetables.  Lean proteins (like fish, chicken and turkey).  Whole grains.  Legumes.  Healthy fats (like fatty fish, nuts, avocados and olive oil).    A COPD diet that includes a variety of foods with healthy vitamins and minerals can help to build your immunity. Known immunity-boosting foods include foods like:    Fatty fish (think salmon, trout and mackerel).  Citrus fruits (like oranges and grapefruit).  Garlic.  Ginger.  Spinach.  Bell peppers.    1. Choose the Right Kinds of Fats.  Eat This:    When eating a higher fat diet, opt for nutrient-dense options. Avocados, nuts, seeds, coconut oil, olive oil, fatty fish and cheese are good choices.    Not That:    Avoid fried foods such as French fries, fried chicken, fried fish and onion rings. When foods are fried, they become extra greasy and need more effort to digest. The bloating that results can make it difficult to take full breaths.    2. Go for Complex Carbohydrates.  Eat This:    Eating a higher fat, lower carb diet does not mean avoiding all carbs -- it means choosing the right carbs. Go for complex carbohydrates that are high in fiber to help your digestive system. This includes bran, lentils, quinoa, oats, potatoes (with the skin) and barley.    Not That:    Simple carbohydrates generally contain little nutritional value. Look at labels and avoid foods with sugar, corn syrup and high-fructose corn syrup, glucose, fructose and sucrose. This includes a lot of processed foods such as packaged snacks and cereals, white bread  and pasta.    3. Opt for the Right Fruits and Vegetables.  Eat This:    Choose a colorful diet filled with nutrient-dense fruits and vegetables. Reach for more digestible fruits and vegetables that do not cause bloating -- leafy greens, cucumbers, bell peppers, carrots, berries, pineapple and grapes.    Not That:    Fruits and vegetables have wonderful nutritional value. But, some can give people with COPD indigestion and bloating, making it harder to breathe. Pay attention to how you feel when eating the following fruits and vegetables:    Apples, apricots, peaches and melons  Cauliflower, broccoli, radishes, [place] sprouts, cabbage and kale  If you don’t notice any bloating or indigestion, continue to eat them freely.    4. Sip Drinks That Hydrate and Thin Mucus  Drink This:    Water! Unless instructed otherwise by your doctor, drink 6-8 glasses of water every day. It can help keep your mucus thin, which means it’s easier to cough up. Want something more flavorful? Try infusing your water with fresh fruit, or sip on herbal or green tea (warm or iced).    Not That:    Some people with COPD may feel symptoms worsen after drinking alcoholic or caffeinated beverages. It’s best to limit your consumption of these and stick to water as much as possible.    Alcohol and/or caffeine could interfere with your COPD medication, so be sure to consult your doctor. You should also avoid sugary drinks such as juice and soda.    5. Stick to a Nutrition Plan.  When you have COPD, it’s also important to make mealtimes as stress-free as possible. With a little planning, you can form healthy eating habits. Here are some tips and guidelines to help you stick to a COPD-friendly nutrition plan:    Eat Small Meals    Instead of eating 3 large meals, try eating 5-6 smaller meals throughout the day. This will help you avoid a full stomach, giving your diaphragm and lungs more space to move freely.    Meal Prep    When living with COPD, you  don’t want to spend too much energy preparing meals. Try breaking it up by doing meal prep in advance so you can throw meals together quickly. For example, chopping vegetables ahead of time.    Ask for Help    Recruit others to make meals easier. Ask friends and family to help with grocery shopping or meal preparation. You can also try a grocery or meal prep delivery service. These services are available for a range of budgets. Or, do some research to see if you qualify for a home delivery meal service.    Eat Slowly Now and Drink Later    If you find that you experience shortness of breath while eating, try to take your time with small bites. Chew food thoroughly, and eat while sitting upright. You can also try drinking water at the end of your meal, rather than taking sips between bites. This can help prevent bloating that can aggravate shortness of breath.      Eating Well is a Balance  Our bodies require water and a source of energy - food - regularly. We also need vitamins and minerals that our bodies cannot produce. We find these in the foods we eat.    If you have COPD, or even if you don’t, eating well is a balance. It’s important to make sure you eat enough of the foods that provide the energy, vitamins, and minerals your body needs. You must also make sure you don’t eat too much of some foods. Too much fat, salt, sugar, and sometimes even too much of some vitamins and minerals can be unhealthy.    The literature on diet and COPD is limited but there are a few references you may wish to read:    A healthy (mostly plant-based) diet is associated with less depression in COPD  REENA Saavedra., MARY ALICE Marin, Velia S. et al. The associations between dietary pattern of chronic obstructive pulmonary disease patients and depression: a cross-sectional study. BMC Pulm Med 21, 8 (2021). https://doi.org/10.1186/z53056-397-67588-8  A diet rich in antioxidants (mostly fresh, hard fruits and some vegetables), has been  "recommended for COPD patients in Role of Diet in Chronic Obstructive Pulmonary Disease Prevention and Treatment (Dyani et al. Nutrients. 2019;11(6):1357. doi: https://doi.org/10.3390/kl71790613)  An earlier study suggested a positive impact of a diet rich in fiber on COPD. This study a https://bmcpulmmed.biomedcentral.com/articles/10.1186/p37163-277-31090-8#citeasnd other small trials in COPD were reviewed by Charly Guo MD, in the 2012 blog \"Treating COPD with Diet\".  A healthy diet is primarily based on whole, unprocessed food, low in saturated fats, trans fats, cholesterol (koe-less-tur-all), salt, and added sugars.    The [place] Department of Agriculture (USDA) 6747-0266 ChooseMyPlate guidance says a healthy diet is one that includes these 5 food groups:    Fruits  Vegetables  Grains (including wheat, rice, oats, cornmeal, barley or another cereal: whole grains or refined grains)  Dairy (fat-free or low-fat milk and milk foods or a plant-based source of calcium such as fortified soy milk or tofu, dark green leafy vegetables with many vegetarian options listed in this USDA link).  Protein lean meats, poultry (chicken and turkey), fish, beans, eggs, and nuts.  The proportion is shown in the image below:    ChooseMyPlate.gov    https://www.choosemyplate.gov    For those of you interested in a plant-based diet, including those of you experiencing problems with airway mucus after eating dairy as noted on [id number], ideas for protein and calcium rich foods and recipes can be found at the following links:    [employer] website and/or [employer] account by Jane Zambrano MD.    If you are underweight, you should increase the number of calories you eat each day.    To add more calories to your diet:    Mix a teaspoon of olive oil into hot foods.  Use peanut butter in sandwiches and snacks.  Use honey or date syrup for sweetening food and beverages.  Drink smoothies " made with nut butters and fruits. Try adding protein powder or egg substitutes for more protein and added calories.  Keep high-calorie snacks around: olives, walnuts, raw almonds, and dried fruits.  Add flaxseed oil or olive oil to cottage cheese or non-dairy cream cheese with fruit.  Other Causes of Weight Loss  The most common reason people with COPD lose weight is that they lose their appetite. Some say they eat less because food does not taste as good as it used to. Others say they get too tired to make meals. For some, chewing, swallowing, and breathing all at the same time is just too much work. Chewing and swallowing adds to the feeling of shortness of breath.    Feeling bloated from swallowing air and not getting enough exercise can make you feel like not eating. Sometimes medicine side effects cause loss of appetite. Some medicines can cause problems with your body absorbing nutrients from food.    To improve your appetite:    Make mealtime as pleasant as possible. Listen to relaxing music.  Do not talk about stressful topics at the table.  Eat healthy snacks throughout the day. Keep them handy.  When you know you should eat but are not hungry, eat some of your favorite foods.    Better Eating Tips for Anyone with COPD  To make foods easier to chew:    Cook vegetables until they are soft.  Mince or grind meats.  Dip breads in liquid.  Eat pasta, mashed potatoes, thick soups, creamed soups, and casseroles.  Try fruit smoothies or milk shakes (there are many non-dairy options).  To decrease shortness of breath:    Try to rest 30 minutes before meals.  Use pursed-lips breathing.  Sit upright and lean forward with your elbows on the table. Put your feet on the floor. This will give you the greatest expansion of the lungs.  Ask your health care provider, if you are on continuous oxygen, if you should increase your flow rate during meals. Do not adjust your oxygen flow without talking with your health care  provider.  Relax before and after meals. Anxiety causes shortness of breath.  To reduce tiredness:    Check out “Meals on Wheels” in your community. This service (or one like it) can provide you with a nutritious, low-cost meal. This will keep you from having to prepare a meal.  Eat six small meals each day instead of three big ones. Digestion requires energy. Energy requires oxygen. If you eat smaller meals, you use less oxygen.  Eat your larger meals earlier in the day.  Rest before eating, but don’t lie down after meals.  Use easy-to-make recipes.  Ask family or friends to help with making meals.  Avoid sweets, cookies, cakes, and pies - simple carbohydrates - these can cause you to hold in too much carbon dioxide. This can cause tiredness.  To reduce bloating:    Try not to rush your meals.  Do not eat when you are short of breath. This can cause you to swallow air. This will make the bloating worse.  Drink fluids one hour before and one hour after a meal. This will decrease the amount of food in the stomach at one time.  Avoid foods such as onions, cabbage, sauerkraut, broccoli, [place] sprouts, and beer.  Eat less fried, fatty food. High-fat foods are digested slowly, causing a feeling of bloating.  Avoid lactose. It may cause bloating. It is found in milk, yogurt, cottage cheese, and fat-free sherbets.  Avoid being constipated (con-sti-pay-hilario) by adding lots of fiber and fluid to your diet.    Bullte Proof Coffee Recipe   ?12 ounces Brewed Coffee hot  ?2 tablespoons Ghee or good  butter ( look at [employer] [place]  butter or [employer] butter)   ?1 tablespoon MCT oil  (coconut oil or similar)   ?pinch pink salt optional  ?½ teaspoon ground cinnamon optional        Please stop smoking.    --Try to cut back on the number of cigarettes that you smoke.    -- Try to increase the interval between cigarettes.   -- Try to use substitutes such as sugar-free candy carrots,celery sticks as in between.  --  Once you are down  "to less than half a pack a day try to go cold turkey.   -- Try to designate areas in the your home as smoke-free areas.   -- Try to reduce the number of ashtrays and other reminders of smoking.   -- Try to keep any major something that you dislike next to your cigarette pack to try to develop a mental aversion to smoking      Below are some Heart Health Tips that we provide to all of our patients. I hope you fing them useful.     - If you are having problems with medications, consider looking at the following websites.   --  \"GoodRx\"   --  \"[facility]\"      - We are happy to supply written prescriptions if needed to allow you to obtain your medications from different pharmacies. Additionally, if you are having issues with mail order delivery, please let us know. We can send a limited supply of your medications to your local pharmacy.     -  We recommend you follow a heart healthy diet. Watch food labels and try not to eat more than 2,500 mg of sodium per day. Avoid foods high in salt like processed meats (lunch meats, mcmanus, and sausage), processed foods (boxed dinners, canned soups), fried and fast foods. Monitor serving sizes and if the sodium per serving size is more than 200 mg, avoid those foods. If the sodium per serving size is between 100-200 mg, you can use those in limited quantities. Try to choose foods where the amount of sodium per serving size is less than 100 mg. Try to eat a diet rich in fruits and vegetables, whole grains, low fat dairy products, skinless poultry and fish, nuts, beans, non-tropical vegetable oils. Limit saturated fat, trans fat, sodium, red meats, and sugar-sweetened beverages.   Limit alcohol     -The combination of a reduced-calorie diet and increased physical activity is recommended. Adults should aim to get at least 150 minutes of moderate physical activity per week (30 minutes of moderate physical activities at least 5 days per week). Examples of moderate " physical activities include brisk walking, swimming, aerobic dancing, heavy gardening, jumping rope, bicycling 10 MPH or faster, tennis, hiking uphill or with a heavy backpack. Please let us know if you would like to learn more about your nutrition and calories and additional options including weight loss programs to help you reach your goal.     -If you smoke, stop smoking. If you stop smoking you can help get rid of a major source of stress to your heart. Smoking makes your heart rate and blood pressure go up and increases your risk or developing cardiovascular diseases and worsen symptoms associated with heart failure.     -Obtain a BP monitor and monitor your BP daily. Check it around the same time each day; at least 1 hour after taking your medications. Record your BP in a log and bring your log with you to your doctors appointment.     -F/u with your PCP as recommended.

## 2024-06-11 LAB
ATRIAL RATE: 71 BPM
P AXIS: 84 DEGREES
P OFFSET: 212 MS
P ONSET: 154 MS
PR INTERVAL: 140 MS
Q ONSET: 224 MS
QRS COUNT: 11 BEATS
QRS DURATION: 86 MS
QT INTERVAL: 388 MS
QTC CALCULATION(BAZETT): 421 MS
QTC FREDERICIA: 410 MS
R AXIS: 89 DEGREES
T AXIS: 71 DEGREES
T OFFSET: 418 MS
VENTRICULAR RATE: 71 BPM

## 2024-06-17 ENCOUNTER — APPOINTMENT (OUTPATIENT)
Dept: NEPHROLOGY | Facility: CLINIC | Age: 74
End: 2024-06-17
Payer: COMMERCIAL

## 2024-06-17 VITALS
OXYGEN SATURATION: 95 % | DIASTOLIC BLOOD PRESSURE: 76 MMHG | HEIGHT: 67 IN | SYSTOLIC BLOOD PRESSURE: 125 MMHG | BODY MASS INDEX: 17.52 KG/M2 | TEMPERATURE: 98.4 F | WEIGHT: 111.6 LBS | HEART RATE: 71 BPM

## 2024-06-17 DIAGNOSIS — N18.4 STAGE 4 CHRONIC KIDNEY DISEASE (MULTI): Primary | ICD-10-CM

## 2024-06-17 PROCEDURE — 1159F MED LIST DOCD IN RCRD: CPT | Performed by: INTERNAL MEDICINE

## 2024-06-17 PROCEDURE — 3074F SYST BP LT 130 MM HG: CPT | Performed by: INTERNAL MEDICINE

## 2024-06-17 PROCEDURE — 4010F ACE/ARB THERAPY RXD/TAKEN: CPT | Performed by: INTERNAL MEDICINE

## 2024-06-17 PROCEDURE — 1157F ADVNC CARE PLAN IN RCRD: CPT | Performed by: INTERNAL MEDICINE

## 2024-06-17 PROCEDURE — 3060F POS MICROALBUMINURIA REV: CPT | Performed by: INTERNAL MEDICINE

## 2024-06-17 PROCEDURE — 99213 OFFICE O/P EST LOW 20 MIN: CPT | Performed by: INTERNAL MEDICINE

## 2024-06-17 PROCEDURE — 3078F DIAST BP <80 MM HG: CPT | Performed by: INTERNAL MEDICINE

## 2024-06-17 NOTE — PROGRESS NOTES
"For follow up, doing well.  Was in [facility] ER over the weekend for increased SoB, was discharged   Still SoB  Concerned about ongoing weight loss, asking about supplements/meds to help gain weight  Not checking BP at home  Denies orthostatic symptoms    RoS negative for all other systems except as noted above.    Visit Vitals  /76 (BP Location: Right arm, Patient Position: Sitting, BP Cuff Size: Adult)   Pulse 71   Temp 36.9 °C (98.4 °F) (Temporal)   Ht 1.702 m (5' 7\")   Wt 50.6 kg (111 lb 9.6 oz)   SpO2 95%   BMI 17.48 kg/m²   Smoking Status Every Day   BSA 1.55 m²      No distress  HEENT:  moist, no pallor  No edema khurram LE  Breath sounds khurram equal  S1 S2 regular, normal, no rub or murmur  Abdomen soft, non tender  AAO x3, non focal    Lab Results   Component Value Date     01/15/2024     01/12/2024     08/21/2023    K 4.1 01/15/2024    K 4.1 01/12/2024    K 4.5 08/21/2023     01/15/2024     01/12/2024     08/21/2023    CO2 25 01/15/2024    CO2 27 01/12/2024    CO2 28 08/21/2023    BUN 33 (H) 01/15/2024    BUN 36 (H) 01/12/2024    BUN 22 08/21/2023    CREATININE 2.62 (H) 01/15/2024    CREATININE 2.89 (H) 01/12/2024    CREATININE 2.25 (H) 08/21/2023    CALCIUM 8.6 01/15/2024    CALCIUM 10.0 01/12/2024    CALCIUM 10.0 08/21/2023    PHOS 2.2 (L) 01/12/2024    PHOS 3.9 08/21/2023    PHOS 3.9 05/25/2023    VITD25 35 01/12/2024    VITD25 38 01/04/2023    VITD25 53 03/16/2022    MICROALBCREA 95.8 (H) 01/12/2024    MICROALBCREA 127.0 (H) 08/21/2023    MICROALBCREA 164.1 (H) 05/25/2023    HGB 12.4 (L) 01/15/2024    HGB 11.9 (L) 07/06/2023    HGB 12.1 (L) 07/05/2023       Current Outpatient Medications   Medication Instructions    acetaminophen (TYLENOL) 650 mg, oral, Every 6 hours PRN    albuterol 90 mcg/actuation inhaler 2 puffs, inhalation, Every 4 hours PRN    allopurinol (ZYLOPRIM) 100 mg, oral, Daily RT    amLODIPine (NORVASC) 10 mg, oral, Daily RT    budesonide-glycopyr-formoterol " (Breztri Aerosphere) 160-9-4.8 mcg/actuation HFA aerosol inhaler 2 puffs, inhalation, 2 times daily    calcitriol (ROCALTROL) 0.25 mcg, oral, Daily    cholecalciferol (Vitamin D-3) 125 MCG (5000 UT) capsule 1 capsule, oral, Daily    docusate sodium (Colace) 100 mg capsule 1 capsule, oral, 2 times daily PRN    Farxiga 10 mg TAKE 1 TABLET ONCE DAILY INTHE MORNING. TAKE BEFORE A MEAL    ferrous sulfate 325 (65 Fe) MG tablet 1 tablet, oral    finasteride (PROSCAR) 5 mg, oral, Daily    folic acid (FOLVITE) 1 mg, oral, Daily    gabapentin (NEURONTIN) 200 mg, oral, 3 times daily    ipratropium-albuteroL (Duo-Neb) 0.5-2.5 mg/3 mL nebulizer solution INHALE THE CONTENTS OF 1   VIAL VIA NEBULIZER 4 TIMES A DAY    losartan (COZAAR) 50 mg, oral, Daily    metoprolol succinate XL (TOPROL-XL) 25 mg, oral, Daily    omeprazole (PriLOSEC) 20 mg DR capsule 1 capsule, oral    oxygen (O2) therapy 4 Liter O2 - CONTINUOUS (route: Oxygen)    polyethylene glycol (GLYCOLAX, MIRALAX) 17 g, oral, Daily PRN    rosuvastatin (CRESTOR) 20 mg, oral, Nightly    tamsulosin (FLOMAX) 0.4 mg, oral, Daily      73-year-old with history of solitary kidney, hypertension, nephrolithiasis, prior episodes of acute kidney injury now with chronic kidney disease      #Chronic kidney disease stage G3 B A2: Creatinine on 6/15/24 at [facility] ER in Care [facility]where was 2.09 mg per DL, EGFR 33 mill per minute, stable. Tolerating Farxiga well, continue for now.  Advised to call PCP for concerns re weight loss     #Bone mineral metabolism: Calcium 10.3 , albumin 4.9 ct calcitriol 0.25 mcg daily     #Hypertension: Goal blood pressure less than 120/80, at goal. Continue amlodipine 10 mg/day, losartan 50 mg once a day and metoprolol 25 mg once a day.   RTC: 3-4 mo

## 2024-06-18 ENCOUNTER — TELEPHONE (OUTPATIENT)
Dept: PRIMARY CARE | Facility: CLINIC | Age: 74
End: 2024-06-18
Payer: COMMERCIAL

## 2024-06-18 NOTE — TELEPHONE ENCOUNTER
"Patient called twice, once yesterday requesting another apt. To discuss his weight lose, then called again today wanting to speak to you about his most recent ED visit on 6/15/24 for SOB; can you look at our schedule and see where I can \"fit\" him in    "

## 2024-07-16 ENCOUNTER — APPOINTMENT (OUTPATIENT)
Dept: OPHTHALMOLOGY | Facility: CLINIC | Age: 74
End: 2024-07-16
Payer: COMMERCIAL

## 2024-07-17 ENCOUNTER — APPOINTMENT (OUTPATIENT)
Dept: PULMONOLOGY | Facility: CLINIC | Age: 74
End: 2024-07-17
Payer: COMMERCIAL

## 2024-07-20 DIAGNOSIS — G62.9 NEUROPATHY: ICD-10-CM

## 2024-07-24 RX ORDER — GABAPENTIN 100 MG/1
200 CAPSULE ORAL 3 TIMES DAILY
Qty: 540 CAPSULE | Refills: 0 | Status: SHIPPED | OUTPATIENT
Start: 2024-07-24

## 2024-08-19 DIAGNOSIS — N40.0 BENIGN PROSTATIC HYPERPLASIA, UNSPECIFIED WHETHER LOWER URINARY TRACT SYMPTOMS PRESENT: ICD-10-CM

## 2024-08-19 DIAGNOSIS — I10 BENIGN ESSENTIAL HYPERTENSION: ICD-10-CM

## 2024-08-19 RX ORDER — HYDROCHLOROTHIAZIDE 25 MG/1
TABLET ORAL
COMMUNITY
Start: 2019-12-23 | End: 2024-08-19 | Stop reason: SDUPTHER

## 2024-08-21 RX ORDER — FINASTERIDE 5 MG/1
5 TABLET, FILM COATED ORAL DAILY
Qty: 30 TABLET | Refills: 0 | Status: SHIPPED | OUTPATIENT
Start: 2024-08-21 | End: 2024-09-20

## 2024-08-21 RX ORDER — HYDROCHLOROTHIAZIDE 25 MG/1
25 TABLET ORAL DAILY
Qty: 30 TABLET | Refills: 0 | Status: SHIPPED | OUTPATIENT
Start: 2024-08-21 | End: 2024-09-20

## 2024-08-28 ENCOUNTER — APPOINTMENT (OUTPATIENT)
Dept: PULMONOLOGY | Facility: CLINIC | Age: 74
End: 2024-08-28
Payer: COMMERCIAL

## 2024-08-29 ASSESSMENT — SLIT LAMP EXAM - LIDS
COMMENTS: GOOD POSITION
COMMENTS: GOOD POSITION

## 2024-08-29 ASSESSMENT — EXTERNAL EXAM - LEFT EYE: OS_EXAM: NORMAL

## 2024-08-29 ASSESSMENT — EXTERNAL EXAM - RIGHT EYE: OD_EXAM: NORMAL

## 2024-08-29 NOTE — PROGRESS NOTES
Glaucoma suspect, both eyesH40.003  -No FH of glaucoma  -Pachymetry (9/3/24) - 490/494.   -OCT RNFL (9/3/24) - OD: Thin S, bord T. OS: Bord S/T. 69/81. 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   -Discussed risk of vision loss with diagnosis of glaucoma.   -Concern for possible change on OCT RNFL vs poor image due to asteroid hyalosis. Recommend f/u 6-8 months - refract OU, dilate OU for PCO check, OCT RNFL, HVF 24-2.     Pseudophakia of right eyeZ96.1 - 11/9/23  Pseudophakia of left eyeZ96.1 - 9/28/23  -Doing well. Good vision. IOP good. Off all gtts.   -May use artificial tears for dryness.  -F/u 6-8 months - refract OU, dilate OU for PCO check, OCT RNFL, HVF 24-2.     Mild nonproliferative diabetic retinopathy of both eyes without macular [id number]  Diabetes [id number]  -OCT macula (9/3/24) - Normal thickness and contour OU. Intact IS-OS OU. No edema OU. 273/275. Stable from 8/29/23.   -HbA1c= 4.9 (5/7/23). History of mild nonproliferative diabetic retinopathy seen on exam. No significant retinopathy seen on exam today, no macular edema OU. Continue close monitoring of blood glucose, blood pressure, and cholesterol.     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 Monitor for now and can consider referral to oculoplastic surgeon as needed.     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   -Patient noticing floaters. No retinal tear or detachment seen on exam. Retinal detachment symptoms discussed.     PyjcqcY54.10  [id number]  -Does not drive.   -Rx given 1/2024 - continue for now.       No history of refractive surgery.   No FH of AMD/glaucoma

## 2024-09-03 ENCOUNTER — APPOINTMENT (OUTPATIENT)
Dept: OPHTHALMOLOGY | Facility: CLINIC | Age: 74
End: 2024-09-03
Payer: COMMERCIAL

## 2024-09-03 DIAGNOSIS — H52.13 MYOPIA, BILATERAL: ICD-10-CM

## 2024-09-03 DIAGNOSIS — E11.3293 MILD NONPROLIFERATIVE DIABETIC RETINOPATHY OF BOTH EYES WITHOUT MACULAR EDEMA ASSOCIATED WITH TYPE 2 DIABETES MELLITUS (MULTI): ICD-10-CM

## 2024-09-03 DIAGNOSIS — Z96.1 PSEUDOPHAKIA: ICD-10-CM

## 2024-09-03 DIAGNOSIS — H02.539: ICD-10-CM

## 2024-09-03 DIAGNOSIS — H43.23 ASTEROID HYALOSIS OF BOTH EYES: ICD-10-CM

## 2024-09-03 DIAGNOSIS — H40.003 GLAUCOMA SUSPECT, BOTH EYES: Primary | ICD-10-CM

## 2024-09-03 DIAGNOSIS — H52.4 PRESBYOPIA: ICD-10-CM

## 2024-09-03 PROCEDURE — 92133 CPTRZD OPH DX IMG PST SGM ON: CPT | Performed by: OPHTHALMOLOGY

## 2024-09-03 PROCEDURE — 76514 ECHO EXAM OF EYE THICKNESS: CPT | Performed by: OPHTHALMOLOGY

## 2024-09-03 PROCEDURE — 92014 COMPRE OPH EXAM EST PT 1/>: CPT | Performed by: OPHTHALMOLOGY

## 2024-09-03 ASSESSMENT — REFRACTION_WEARINGRX
OD_ADD: +2.50
OD_SPHERE: -1.00
OD_CYLINDER: -0.50
OS_CYLINDER: -0.50
OS_AXIS: 090
OD_AXIS: 050
OS_SPHERE: -0.50
OS_ADD: +2.50

## 2024-09-03 ASSESSMENT — REFRACTION_MANIFEST
OS_SPHERE: -0.50
OD_AXIS: 050
OD_SPHERE: -1.75
OD_ADD: +2.50
OS_CYLINDER: -0.50
OD_CYLINDER: -0.50
OS_AXIS: 085
OS_ADD: +2.50

## 2024-09-03 ASSESSMENT — CONF VISUAL FIELD
OS_INFERIOR_NASAL_RESTRICTION: 0
OD_INFERIOR_NASAL_RESTRICTION: 0
OS_INFERIOR_TEMPORAL_RESTRICTION: 0
OS_SUPERIOR_TEMPORAL_RESTRICTION: 0
OS_SUPERIOR_NASAL_RESTRICTION: 0
OD_SUPERIOR_TEMPORAL_RESTRICTION: 0
OD_NORMAL: 1
OS_NORMAL: 1
OD_INFERIOR_TEMPORAL_RESTRICTION: 0
OD_SUPERIOR_NASAL_RESTRICTION: 0

## 2024-09-03 ASSESSMENT — CUP TO DISC RATIO
OS_RATIO: 0.35
OD_RATIO: 0.35

## 2024-09-03 ASSESSMENT — VISUAL ACUITY
OD_CC: 20/30-2
CORRECTION_TYPE: GLASSES
METHOD: SNELLEN - LINEAR
OS_CC: 20/20

## 2024-09-03 ASSESSMENT — TONOMETRY
OS_IOP_MMHG: 11
IOP_METHOD: GOLDMANN APPLANATION
OD_IOP_MMHG: 11

## 2024-09-03 ASSESSMENT — PACHYMETRY
OD_CT(UM): 490
OS_CT(UM): 494

## 2024-09-17 ENCOUNTER — APPOINTMENT (OUTPATIENT)
Dept: PRIMARY CARE | Facility: CLINIC | Age: 74
End: 2024-09-17
Payer: COMMERCIAL

## 2024-09-17 VITALS
HEIGHT: 67 IN | BODY MASS INDEX: 18.83 KG/M2 | SYSTOLIC BLOOD PRESSURE: 128 MMHG | WEIGHT: 120 LBS | DIASTOLIC BLOOD PRESSURE: 76 MMHG | HEART RATE: 70 BPM

## 2024-09-17 DIAGNOSIS — E11.41 DIABETIC MONONEUROPATHY ASSOCIATED WITH TYPE 2 DIABETES MELLITUS (MULTI): ICD-10-CM

## 2024-09-17 DIAGNOSIS — Z13.6 SCREENING FOR AAA (ABDOMINAL AORTIC ANEURYSM): ICD-10-CM

## 2024-09-17 DIAGNOSIS — Z00.00 HEALTH CARE MAINTENANCE: ICD-10-CM

## 2024-09-17 DIAGNOSIS — Z12.5 SCREENING PSA (PROSTATE SPECIFIC ANTIGEN): Primary | ICD-10-CM

## 2024-09-17 DIAGNOSIS — I73.9 PERIPHERAL VASCULAR DISEASE (CMS-HCC): ICD-10-CM

## 2024-09-17 DIAGNOSIS — N18.4 CKD (CHRONIC KIDNEY DISEASE) STAGE 4, GFR 15-29 ML/MIN (MULTI): ICD-10-CM

## 2024-09-17 DIAGNOSIS — E21.3 HYPERPARATHYROIDISM (MULTI): ICD-10-CM

## 2024-09-17 DIAGNOSIS — N40.0 BENIGN PROSTATIC HYPERPLASIA, UNSPECIFIED WHETHER LOWER URINARY TRACT SYMPTOMS PRESENT: ICD-10-CM

## 2024-09-17 DIAGNOSIS — E44.0 MODERATE PROTEIN-CALORIE MALNUTRITION (MULTI): ICD-10-CM

## 2024-09-17 DIAGNOSIS — J96.11 CHRONIC RESPIRATORY FAILURE WITH HYPOXIA (MULTI): ICD-10-CM

## 2024-09-17 DIAGNOSIS — C64.2 RENAL CELL CARCINOMA OF LEFT KIDNEY (MULTI): ICD-10-CM

## 2024-09-17 DIAGNOSIS — F17.200 TOBACCO DEPENDENCY: ICD-10-CM

## 2024-09-17 DIAGNOSIS — I31.39 PERICARDIAL EFFUSION (HHS-HCC): ICD-10-CM

## 2024-09-17 DIAGNOSIS — F17.200 TOBACCO DEPENDENCE SYNDROME: ICD-10-CM

## 2024-09-17 DIAGNOSIS — R73.9 HYPERGLYCEMIA: ICD-10-CM

## 2024-09-17 DIAGNOSIS — E21.0 PRIMARY HYPERPARATHYROIDISM (MULTI): ICD-10-CM

## 2024-09-17 PROCEDURE — 1160F RVW MEDS BY RX/DR IN RCRD: CPT | Performed by: INTERNAL MEDICINE

## 2024-09-17 PROCEDURE — 3078F DIAST BP <80 MM HG: CPT | Performed by: INTERNAL MEDICINE

## 2024-09-17 PROCEDURE — 1159F MED LIST DOCD IN RCRD: CPT | Performed by: INTERNAL MEDICINE

## 2024-09-17 PROCEDURE — 90662 IIV NO PRSV INCREASED AG IM: CPT | Performed by: INTERNAL MEDICINE

## 2024-09-17 PROCEDURE — 3008F BODY MASS INDEX DOCD: CPT | Performed by: INTERNAL MEDICINE

## 2024-09-17 PROCEDURE — 4010F ACE/ARB THERAPY RXD/TAKEN: CPT | Performed by: INTERNAL MEDICINE

## 2024-09-17 PROCEDURE — 1123F ACP DISCUSS/DSCN MKR DOCD: CPT | Performed by: INTERNAL MEDICINE

## 2024-09-17 PROCEDURE — 99214 OFFICE O/P EST MOD 30 MIN: CPT | Performed by: INTERNAL MEDICINE

## 2024-09-17 PROCEDURE — 1157F ADVNC CARE PLAN IN RCRD: CPT | Performed by: INTERNAL MEDICINE

## 2024-09-17 PROCEDURE — G0008 ADMIN INFLUENZA VIRUS VAC: HCPCS | Performed by: INTERNAL MEDICINE

## 2024-09-17 PROCEDURE — G2211 COMPLEX E/M VISIT ADD ON: HCPCS | Performed by: INTERNAL MEDICINE

## 2024-09-17 PROCEDURE — 3074F SYST BP LT 130 MM HG: CPT | Performed by: INTERNAL MEDICINE

## 2024-09-17 PROCEDURE — 3060F POS MICROALBUMINURIA REV: CPT | Performed by: INTERNAL MEDICINE

## 2024-09-17 RX ORDER — FINASTERIDE 5 MG/1
5 TABLET, FILM COATED ORAL DAILY
Qty: 90 TABLET | Refills: 3 | Status: SHIPPED | OUTPATIENT
Start: 2024-09-17 | End: 2025-09-12

## 2024-09-17 NOTE — ASSESSMENT & PLAN NOTE
Continue statin.  Encourage exercise.  Recommended Mediterranean diet.  Will call with results of testing and adjust medications pending results

## 2024-09-17 NOTE — PROGRESS NOTES
Subjective   Patient ID: Charly Cornejo is a 74 y.o. male who presents for [facility].    HPI   Patient is a 74-year-old male with past medical history of hypertension dyslipidemia GERD CKD stage IV BPH solitary kidney COPD with respiratory failure nicotine dependence and multiple histories of pancreatitis who presents to [facility] care    Patient has chronic respiratory failure due to severe COPD on 4 L nasal cannula and follows with pulmonary medicine at [facility].  Currently on Trelegy.  He uses albuterol as needed.  He states there was a plan to switch him to airsupra however it is quite costly.  He has had 2 admissions to the emergency room for COPD exacerbations.  He is currently on appropriate therapy with 4 L nasal cannula.  He is not prepared to quit smoking at this time.    Hypertension currently well-controlled on current medications.  Denies headache changes in vision orthopnea.  Takes his medications as prescribed.  He has not been taking his hydrochlorothiazide which arguably he should not be taking in any event due to CKD 4.    CKD 4 and following with nephrology.  Currently on ARB as well as SGLT2.  His SGLT2 is quite costly and is wondering if there are any coupons that can help bring down the cost.  He has upcoming appoint with nephrology and due for labs.  He has a solitary kidney due to prior history of renal cell carcinoma.    He does have a history of pancytopenia.  Currently he has a mildly depressed hemoglobin.  Presumably due to his CKD 4 as well as a low white blood cell count.  He is due for repeat CBC.  His platelet counts however are normal however previously they have been depressed.  No active bleeding      Patient uses a walker to get around.  He lives at home and has a girlfriend.  All kids are out of the house              Review of Systems  Constitutional: No fever or chills  Cardiovascular: no chest pain, no palpitations and no syncope.   Respiratory: no cough, no shortness  "of breath during exertion and no shortness of breath at rest.   Gastrointestinal: no abdominal pain, no nausea and no vomiting.  Neuro: No Headache, no dizziness    Objective   /76   Pulse 70   Ht 1.702 m (5' 7\")   Wt 54.4 kg (120 lb)   BMI 18.79 kg/m²     Physical Exam  Constitutional: Alert and in no acute distress. Well developed, well nourished  Head and Face: Head and face: Normal.    Cardiovascular: Heart rate and rhythm were normal, normal S1 and S2. No peripheral edema.   Pulmonary: Oxygen dependent, no respiratory distress. Clear bilateral breath sounds.  Musculoskeletal: Gait and station: Normal. Muscle strength/tone: Normal.   Skin: Normal skin color and pigmentation, normal skin turgor, and no rash.    Psychiatric: Judgment and insight: Intact. Mood and affect: Normal.    Procedures    Lab Results   Component Value Date    WBC 3.8 (L) 01/15/2024    HGB 12.4 (L) 01/15/2024    HCT 38.6 (L) 01/15/2024     01/15/2024    CHOL 124 01/04/2023    TRIG 49 01/04/2023    HDL 69.6 01/04/2023    LDLDIRECT 111 01/14/2021    ALT 49 01/15/2024    AST 41 (H) 01/15/2024     01/15/2024    K 4.1 01/15/2024     01/15/2024    CREATININE 2.62 (H) 01/15/2024    BUN 33 (H) 01/15/2024    CO2 25 01/15/2024    TSH 2.47 01/04/2023    PSA 0.47 02/24/2021    INR 1.1 10/13/2022    HGBA1C 4.9 05/07/2023       OCT, Optic Nerve - OU - Both Eyes  OCT RNFL (9/3/24) - OD: Thin S, bord T. OS: Bord S/T. 69/81. Thinning OD   compared to 6/18/21, but poor quality image (likely due to dense asteroid   hyalosis).   OCT, Retina - OU - Both Eyes  OCT macula (9/3/24) - Normal thickness and contour OU. Intact IS-OS OU. No   edema OU. 273/275. Stable from 8/29/23.             Assessment/Plan   Problem List Items Addressed This Visit             ICD-10-CM    BPH (benign prostatic hyperplasia) N40.0    Relevant Medications    finasteride (Proscar) 5 mg tablet    Diabetic neuropathy (Multi) E11.40     Check A1c although his " A1c for the last 4 years have been controlled.  Unclear when the diagnosis was made.  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  Follows with urology.         Malnutrition (Multi) E46     Gained 4 pounds since last visit.  Continue with high calorie diet         Pericardial effusion ([facility]-HCC) I31.39    Peripheral vascular disease (CMS-HCC) I73.9     Continue statin.  Encourage exercise.  Recommended Mediterranean diet.  Will call with results of testing and adjust medications pending results         Tobacco dependence syndrome F17.200     Smoking Cessation   -Smoking coaches are available to you if need help with quitting ----->[phone]  -Try to cut back every day until you are able to quit   -If you need more resources to quit smoking I am here to help   -set yourself a quit date and work towards that goal             Respiratory failure, unspecified with hypoxia (Multi) J96.91     Continue following with pulmonary medicine.  Continue Trelegy once daily.  4 L nasal cannula.         Primary hyperparathyroidism (Multi) E21.0     Secondary to CKD 4.  Continue with bone mineralization labs per nephrology.  Check renal function.  Follow-up with nephrology          Other Visit Diagnoses         Codes    Screening PSA (prostate specific antigen)    -  Primary Z12.5    Relevant Orders    Prostate Spec.Ag,Screen    Health care maintenance     Z00.00    Relevant Orders    Lipid Panel    Follow Up In [facility] - PCP - Medicare Annual    CKD (chronic kidney disease) stage 4, GFR 15-29 ml/min (Multi)     N18.4    Relevant Orders    Follow Up In [facility] - Pharmacy    Hyperglycemia     R73.9    Relevant Orders    Hemoglobin A1C    Screening for AAA (abdominal aortic aneurysm)     Z13.6     Relevant Orders    Vascular US Abdominal Aorta Aneurysm AAA Screening    Tobacco dependency     F17.200              Tobacco Counseling  The patient smokes cigarettes and desires to quit.  We created the following quit plan:  Quit assistance referrals: none.

## 2024-09-17 NOTE — ASSESSMENT & PLAN NOTE
Secondary to CKD 4.  Continue with bone mineralization labs per nephrology.  Check renal function.  Follow-up with nephrology

## 2024-09-17 NOTE — ASSESSMENT & PLAN NOTE
Smoking Cessation   -Smoking coaches are available to you if need help with quitting ----->[phone]  -Try to cut back every day until you are able to quit   -If you need more resources to quit smoking I am here to help   -set yourself a quit date and work towards that goal

## 2024-09-17 NOTE — ASSESSMENT & PLAN NOTE
Check A1c although his A1c for the last 4 years have been controlled.  Unclear when the diagnosis was made.  Would not restrict carbohydrates at this time especially in the setting of protein caloric malnutrition

## 2024-09-17 NOTE — PATIENT INSTRUCTIONS
Quitting Smoking    Quitting smoking is the most important step you can take to improve your health. We're glad you have set a goal to improve your health.    Quit Smoking Resources    In addition to medications, use the STAR plan to help you successfully quit.   Stick with your quit date!   Tell friends, family, and coworkers your quit date. Request their understanding and support.  Anticipate and prepare for challenges. Some examples are withdrawal symptoms, being around others who smoke, and drinking alcohol.  Remove all tobacco products and paraphernalia from your environment. Make your home and vehicles smoke-free.    Free resources for additional support:  National tobacco quitline: 1-800-QUIT-NOW (1-161.634.1137).  SmokefreeTXT is a free text program to assist you in quitting. Visit https://www.smokefree.gov/smokefreetxt for more information.  Feel free to call your care manager at ([phone]) for additional support.

## 2024-09-20 ENCOUNTER — LAB (OUTPATIENT)
Dept: LAB | Facility: LAB | Age: 74
End: 2024-09-20
Payer: COMMERCIAL

## 2024-09-20 ENCOUNTER — HOSPITAL ENCOUNTER (OUTPATIENT)
Dept: RADIOLOGY | Facility: CLINIC | Age: 74
Discharge: HOME | End: 2024-09-20
Payer: COMMERCIAL

## 2024-09-20 DIAGNOSIS — Z00.00 HEALTH CARE MAINTENANCE: ICD-10-CM

## 2024-09-20 DIAGNOSIS — Z12.5 SCREENING PSA (PROSTATE SPECIFIC ANTIGEN): ICD-10-CM

## 2024-09-20 DIAGNOSIS — R73.9 HYPERGLYCEMIA: ICD-10-CM

## 2024-09-20 DIAGNOSIS — Z12.2 ENCOUNTER FOR SCREENING FOR MALIGNANT NEOPLASM OF RESPIRATORY ORGANS: ICD-10-CM

## 2024-09-20 PROCEDURE — 71250 CT THORAX DX C-: CPT

## 2024-09-20 PROCEDURE — 36415 COLL VENOUS BLD VENIPUNCTURE: CPT

## 2024-09-21 LAB
CHOLEST SERPL-MCNC: 148 MG/DL (ref 0–199)
CHOLESTEROL/HDL RATIO: 1.9
EST. AVERAGE GLUCOSE BLD GHB EST-MCNC: 111 MG/DL
HBA1C MFR BLD: 5.5 %
HDLC SERPL-MCNC: 79.2 MG/DL
LDLC SERPL CALC-MCNC: 60 MG/DL
NON HDL CHOLESTEROL: 69 MG/DL (ref 0–149)
PSA SERPL-MCNC: 1.07 NG/ML
TRIGL SERPL-MCNC: 42 MG/DL (ref 0–149)
VLDL: 8 MG/DL (ref 0–40)

## 2024-09-26 ENCOUNTER — HOSPITAL ENCOUNTER (OUTPATIENT)
Dept: VASCULAR MEDICINE | Facility: CLINIC | Age: 74
Discharge: HOME | End: 2024-09-26
Payer: COMMERCIAL

## 2024-09-26 DIAGNOSIS — Z13.6 SCREENING FOR AAA (ABDOMINAL AORTIC ANEURYSM): ICD-10-CM

## 2024-09-26 PROCEDURE — 76706 US ABDL AORTA SCREEN AAA: CPT

## 2024-09-26 PROCEDURE — 76706 US ABDL AORTA SCREEN AAA: CPT | Performed by: INTERNAL MEDICINE

## 2024-09-30 DIAGNOSIS — N18.4 STAGE 4 CHRONIC KIDNEY DISEASE (MULTI): ICD-10-CM

## 2024-09-30 RX ORDER — DAPAGLIFLOZIN 10 MG/1
10 TABLET, FILM COATED ORAL DAILY
Qty: 90 TABLET | Refills: 3 | Status: SHIPPED | OUTPATIENT
Start: 2024-09-30 | End: 2025-09-30

## 2024-10-03 ENCOUNTER — APPOINTMENT (OUTPATIENT)
Dept: PHARMACY | Facility: HOSPITAL | Age: 74
End: 2024-10-03
Payer: COMMERCIAL

## 2024-10-03 DIAGNOSIS — N18.4 CKD (CHRONIC KIDNEY DISEASE) STAGE 4, GFR 15-29 ML/MIN (MULTI): ICD-10-CM

## 2024-10-03 DIAGNOSIS — N18.4 STAGE 4 CHRONIC KIDNEY DISEASE (MULTI): ICD-10-CM

## 2024-10-03 NOTE — PROGRESS NOTES
Clinical Pharmacy Appointment    Patient ID: Charly Cornejo is a 74 y.o. male who presents for Chronic Kidney Disease.    Pt is here for First appointment.     Referring Provider: Jermaine Jimenez DO  PCP: Jermaine Jimenez DO   Last visit with PCP: 24   Next visit with PCP: 3/17/25    Subjective     HPI  CHRONIC KIDNEY DISEASE ASSESSMENT  Does patient see nephrology? Yes    Date: 10/14/24    Current medications include:  Farxiga 10 mg daily    Clarifications to above regimen: N/A  Adverse Effects: N/A    Stage: 4 (eGFR 15-29)  Albuminuria: A2 ( mg/g)  ACE/ARB: Yes, losartan 50 mg daily    Medication Review  Current medications and doses were reviewed and are appropriate per current kidney function.  The following medications increase risk of TIMOTHY and should be closely monitored:  ARB    Hypertension History:  HTN diagnosis: yes  Current Regimen  Amlodipine 10 mg daily  Losartan 50 mg daily  HTN at goal? yes    Hyperlipidemia History:  Diagnosis? yes  Current Regimen  Rosuvastatin 20 mg daily  At goal? yes  Current LDL: 60  Current T    Diabetes History:  Diagnosis? yes  Current Regimen:  Farxiga 10 mg daily  At goal? yes    Lab Review  Electrolytes:  potassium slightly elevated at 5.3 (24)  Alk phos: Within normal limits (24)  Sodium bicarb: Within normal limits (24)  PTH: Within normal limits (24)  Hgb:  slightly low at 12 g/dL (24)     Drug Interactions  No relevant drug interactions were noted.    Medication System Management  Patient's preferred pharmacy: [facility]  Adherence/Organization: No issues  Affordability/Accessibility: Farxiga     Patient Assistance Screening (VAF)    Patient verbally reports monthly or yearly income which is less than 400% federal poverty level    Application for program has been submitted for the following medications: Farxiga    If approved medication must be filled through [facility] pharmacy and may be picked up or mailed to patient.       Objective   Allergies   Allergen Reactions    Aspirin Nausea And Vomiting and Other     GI Upset    Azithromycin Rash     Social History     Social History Narrative    Not on file      Medication Review  Current Outpatient Medications   Medication Instructions    acetaminophen (TYLENOL) 650 mg, oral, Every 6 hours PRN    albuterol 90 mcg/actuation inhaler 2 puffs, inhalation, Every 4 hours PRN    allopurinol (ZYLOPRIM) 100 mg, oral, Daily RT    amLODIPine (NORVASC) 10 mg, oral, Daily RT    budesonide-glycopyr-formoterol (Breztri Aerosphere) 160-9-4.8 mcg/actuation HFA aerosol inhaler 2 puffs, inhalation, 2 times daily    calcitriol (ROCALTROL) 0.25 mcg, oral, Daily    cholecalciferol (Vitamin D-3) 125 MCG (5000 UT) capsule 1 capsule, oral, Daily    docusate sodium (Colace) 100 mg capsule 1 capsule, oral, 2 times daily PRN    Farxiga 10 mg, oral, Daily    ferrous sulfate 325 (65 Fe) MG tablet 1 tablet, oral    finasteride (PROSCAR) 5 mg, oral, Daily    folic acid (FOLVITE) 1 mg, oral, Daily    gabapentin (NEURONTIN) 200 mg, oral, 3 times daily    ipratropium-albuteroL (Duo-Neb) 0.5-2.5 mg/3 mL nebulizer solution INHALE THE CONTENTS OF 1   VIAL VIA NEBULIZER 4 TIMES A DAY    losartan (COZAAR) 50 mg, oral, Daily    metoprolol succinate XL (TOPROL-XL) 25 mg, oral, Daily    omeprazole (PriLOSEC) 20 mg DR capsule 1 capsule, oral    oxygen (O2) therapy 4 Liter O2 - CONTINUOUS (route: Oxygen)    polyethylene glycol (GLYCOLAX, MIRALAX) 17 g, oral, Daily PRN    rosuvastatin (CRESTOR) 20 mg, oral, Nightly    tamsulosin (FLOMAX) 0.4 mg, oral, Daily      Vitals  BP Readings from Last 2 Encounters:   09/17/24 128/76   06/17/24 125/76     BMI Readings from Last 1 Encounters:   09/17/24 18.79 kg/m²      Labs  A1C  Lab Results   Component Value Date    HGBA1C 5.5 09/20/2024    HGBA1C 4.9 05/07/2023    HGBA1C 5.3 01/04/2023     BMP  Lab Results   Component Value Date    CALCIUM 8.6 01/15/2024     01/15/2024    K 4.1  01/15/2024    CO2 25 01/15/2024     01/15/2024    BUN 33 (H) 01/15/2024    CREATININE 2.62 (H) 01/15/2024    EGFR 25 (L) 01/15/2024     LFTs  Lab Results   Component Value Date    ALT 49 01/15/2024    AST 41 (H) 01/15/2024    ALKPHOS 74 01/15/2024    BILITOT 0.7 01/15/2024     FLP  Lab Results   Component Value Date    TRIG 42 09/20/2024    CHOL 148 09/20/2024    LDLF 45 01/04/2023    LDLCALC 60 09/20/2024    HDL 79.2 09/20/2024     Urine Microalbumin  Lab Results   Component Value Date    MICROALBCREA 95.8 (H) 01/12/2024     Weight Management  Wt Readings from Last 3 Encounters:   09/17/24 54.4 kg (120 lb)   06/17/24 50.6 kg (111 lb 9.6 oz)   06/10/24 51.2 kg (112 lb 12.8 oz)      There is no height or weight on file to calculate BMI.     Assessment/Plan   Problem List Items Addressed This Visit       Chronic kidney disease, unspecified     ASSESSMENT OF CHRONIC KIDNEY DISEASE  Patient's CKD is stable.  Rationale for plan: patient follows with nephrology so we will not make any changes. Only enroll patient in  PAP.    Medication Changes:  CONTINUE  Farxiga 10 mg daily  Losartan 50 mg daily    Monitoring and Education Discussed:  Reviewed CKD lab results and benefits of good hydration  Discussed avoidance of NSAIDs, and use of Tylenol for headaches/pain  Recommended need for good control of blood pressure and blood glucose  Advised to report any changes in fluid retention, weight or decreased urinary output  Counseled patient on MOA, expectations, duration of therapy, contraindications, administration, and monitoring parameters   Answered all patient questions and concerns         Relevant Medications    Farxiga 10 mg     Other Visit Diagnoses       CKD (chronic kidney disease) stage 4, GFR 15-29 ml/min (Multi)              Clinical Pharmacist follow-up: 1 year, Telehealth visit    Continue all meds under the continuation of care with the referring provider and clinical pharmacy team.    Thank you,  Olinda  Serena  Clinical Pharmacist  [phone]    Verbal consent to manage patient's drug therapy was obtained from the patient. They were informed they may decline to participate or withdraw from participation in pharmacy services at any time.

## 2024-10-09 RX ORDER — DAPAGLIFLOZIN 10 MG/1
10 TABLET, FILM COATED ORAL DAILY
Qty: 90 TABLET | Refills: 3 | Status: SHIPPED | OUTPATIENT
Start: 2024-10-09 | End: 2025-10-09

## 2024-10-09 NOTE — ASSESSMENT & PLAN NOTE
ASSESSMENT OF CHRONIC KIDNEY DISEASE  Patient's CKD is stable.  Rationale for plan: patient follows with nephrology so we will not make any changes. Only enroll patient in  PAP.    Medication Changes:  CONTINUE  Farxiga 10 mg daily  Losartan 50 mg daily    Monitoring and Education Discussed:  Reviewed CKD lab results and benefits of good hydration  Discussed avoidance of NSAIDs, and use of Tylenol for headaches/pain  Recommended need for good control of blood pressure and blood glucose  Advised to report any changes in fluid retention, weight or decreased urinary output  Counseled patient on MOA, expectations, duration of therapy, contraindications, administration, and monitoring parameters   Answered all patient questions and concerns

## 2024-10-10 ENCOUNTER — ANCILLARY PROCEDURE (OUTPATIENT)
Dept: CARDIOLOGY | Facility: CLINIC | Age: 74
End: 2024-10-10
Payer: COMMERCIAL

## 2024-10-10 ENCOUNTER — OFFICE VISIT (OUTPATIENT)
Dept: CARDIOLOGY | Facility: CLINIC | Age: 74
End: 2024-10-10
Payer: COMMERCIAL

## 2024-10-10 VITALS
DIASTOLIC BLOOD PRESSURE: 84 MMHG | HEIGHT: 67 IN | WEIGHT: 116 LBS | OXYGEN SATURATION: 99 % | BODY MASS INDEX: 18.21 KG/M2 | SYSTOLIC BLOOD PRESSURE: 135 MMHG | HEART RATE: 59 BPM

## 2024-10-10 DIAGNOSIS — I25.10 CORONARY ARTERY CALCIFICATION: ICD-10-CM

## 2024-10-10 DIAGNOSIS — N18.4 STAGE 4 CHRONIC KIDNEY DISEASE (MULTI): Primary | ICD-10-CM

## 2024-10-10 DIAGNOSIS — I31.39 PERICARDIAL EFFUSION (HHS-HCC): ICD-10-CM

## 2024-10-10 DIAGNOSIS — E78.2 MIXED HYPERLIPIDEMIA: ICD-10-CM

## 2024-10-10 DIAGNOSIS — J96.11 CHRONIC RESPIRATORY FAILURE WITH HYPOXIA (MULTI): ICD-10-CM

## 2024-10-10 DIAGNOSIS — I10 BENIGN ESSENTIAL HYPERTENSION: Primary | ICD-10-CM

## 2024-10-10 LAB
ATRIAL RATE: 59 BPM
P AXIS: 80 DEGREES
P OFFSET: 202 MS
P ONSET: 153 MS
PR INTERVAL: 138 MS
Q ONSET: 222 MS
QRS COUNT: 10 BEATS
QRS DURATION: 84 MS
QT INTERVAL: 430 MS
QTC CALCULATION(BAZETT): 425 MS
QTC FREDERICIA: 427 MS
R AXIS: 88 DEGREES
T AXIS: 82 DEGREES
T OFFSET: 437 MS
VENTRICULAR RATE: 59 BPM

## 2024-10-10 PROCEDURE — 3079F DIAST BP 80-89 MM HG: CPT | Performed by: INTERNAL MEDICINE

## 2024-10-10 PROCEDURE — 4010F ACE/ARB THERAPY RXD/TAKEN: CPT | Performed by: INTERNAL MEDICINE

## 2024-10-10 PROCEDURE — 3048F LDL-C <100 MG/DL: CPT | Performed by: INTERNAL MEDICINE

## 2024-10-10 PROCEDURE — 99214 OFFICE O/P EST MOD 30 MIN: CPT | Performed by: INTERNAL MEDICINE

## 2024-10-10 PROCEDURE — 3075F SYST BP GE 130 - 139MM HG: CPT | Performed by: INTERNAL MEDICINE

## 2024-10-10 PROCEDURE — 1123F ACP DISCUSS/DSCN MKR DOCD: CPT | Performed by: INTERNAL MEDICINE

## 2024-10-10 PROCEDURE — G2211 COMPLEX E/M VISIT ADD ON: HCPCS | Performed by: INTERNAL MEDICINE

## 2024-10-10 PROCEDURE — 3008F BODY MASS INDEX DOCD: CPT | Performed by: INTERNAL MEDICINE

## 2024-10-10 PROCEDURE — 3044F HG A1C LEVEL LT 7.0%: CPT | Performed by: INTERNAL MEDICINE

## 2024-10-10 PROCEDURE — 3060F POS MICROALBUMINURIA REV: CPT | Performed by: INTERNAL MEDICINE

## 2024-10-10 PROCEDURE — 1159F MED LIST DOCD IN RCRD: CPT | Performed by: INTERNAL MEDICINE

## 2024-10-10 PROCEDURE — 1126F AMNT PAIN NOTED NONE PRSNT: CPT | Performed by: INTERNAL MEDICINE

## 2024-10-10 PROCEDURE — 1157F ADVNC CARE PLAN IN RCRD: CPT | Performed by: INTERNAL MEDICINE

## 2024-10-10 PROCEDURE — 93005 ELECTROCARDIOGRAM TRACING: CPT

## 2024-10-10 ASSESSMENT — PAIN SCALES - GENERAL: PAINLEVEL: 0-NO PAIN

## 2024-10-10 ASSESSMENT — ENCOUNTER SYMPTOMS: OCCASIONAL FEELINGS OF UNSTEADINESS: 1

## 2024-10-10 NOTE — PROGRESS NOTES
Chief Complaint:   Hypertension, Hyperlipidemia, Coronary Artery Disease, and Follow-up (4 month)     History Of Present Illness:    Charly Cornejo is a 74 y.o. male presenting with year old -American Male who is pertinent medical history notable for chronic alcohol use disorder, alcohol-induced pancreatitis, essential hypertension, chronic liver disease, COPD, left renal  mass with imaging consistent for renal cell carcinoma, pancreatic pseudocyst, tobacco abuse, hyperparathyroidism S/P parathyroidectomy who presents to cardiology clinic for follow up     He had a very complicated course from June through July 2020. Please see detailed encounters as well as discharge summaries found within the EMR. Since his recovery from his complicated course, he has been doing well from             able to walk and participate in his activities of daily living. He lives in a one floor apartment that he is able to managed by themselves. He does not endorse chest pain, heaviness, pressure. He has some moderate exertional shortness of breath which he reports is related to COPD. It is essentially unchanged. He continues to smoke, down to 10 cigarettes per day. He has completed pharmacological stress testing which was negative for inducible ischemia. He underwent open left radical nephrectomy on 2/15/2021. There, his intraoperative course and recovery was complicated by COPD exacerbation requiring intubation, sedation with interval development of alert large right pneumothorax drained by pigtail chest tube. He then underwent VATS for blebectomy on 02/22/2021 and ultimately was discharged home.      Since he was last seen, he unfortunately had additional pulmonary complications. He was admitted 9/24 -->10/7 for shortness of breath related to Pneumothorax. He was treated with CT but had continued air leak prompting OR for management. 9/27 he had a Bronchoscopy, BAL, Left VATS lower lobe wedge resection, lower lobe blebectomy,  upper lobe wedge, mechanical pleurodesis - complete but had persistent air leak. DVT scan done 10/4 positive for RUE DVT (axillary and brachial)--re imaged and seen by [facility] who felt that his represented untreated chronic thrombus and did not recommend anticoagulation due to Cirrhosis Hx. He had repeat Bronchoscopy due to Left Lower Lobe Collapse with thick secretions present.      Since his discharge, he has continued to recover. He feels improved and feels that his breathing is stable to improved as well. He voices no cardiac symptoms at this time. He feels that he has been doing well with his medical issues. He is hopeful to get outside and walk more with better weather.      He returns today (11/29/2022) for follow up. He has been in his usual state of health he has been working to address many of his chronic medical conditions. He has met with general surgery to have a scalp mass addressed and feels good about this. He had no undue complications related to this procedure.. He continues to smoke cigarettes, but is working on reducing this. He denies any cardiovascular complaints. He is planning on getting some dental work done to help address some other issues. He has paperwork that he request be out for his dentist.     Today ( 5/2/2023) he returns for follow up. He has finally gotten his dentures and is back to eating. meals. Feels much better about this. He has followed up with multiple specialist since he was last sen. He has been started on Farxiga for Kidney Disease and is tolering this well. He is compliant with medications and his results are doing well. He has not been able to give up smoking,still hovers around 1/2 PPD. Engaging in sexual relations without chest pressure, pain, shortness of breath beyond his baseline. Overall, he offers no significant cardiovascular complaints    2/9/2024 -- He returns for follow up. He has overall been well from a cardiac standpoint. He notes some left sided  "abdominal discomfort hat seemes to alternate between Constipation and Explosive Diarrhea. He reports considerable flatus during these episodes. States that he has been having some issues regarding staying well-hydrated as well as keeping his weight up..  He does note some continued shortness of breath but does not seem to have any change.    6/10/2024 --he returns for scheduled follow-up.  He overall has done well from a cardiovascular standpoint.  He continues to have issues with bloat.  He is surprised and shocked to see that his weight is plummeted as far as it has.  This is very distressing for him.  He has been trying to eat better to help support his weight.  [facility] overall [facility] with [facility] experience, but wants to continue with his current doctors secondary to convenience.    10/10/2024 -- He returns for follow up. He has esta blished care with a new PCP and is satisfied with his care.  He has aslo established with [facility] ( [facility] and  with extremely long wait listing) and had undergone testing fof COPD. His Spirometry reports a Moderate Restrictive process. Interestingly, his 6MWT did not show any significant desaturations despite requiring 4 LPM while in the office???  He has had several hospitalizations in the early summer due to COPD exacerbations, but now reports breathing is easier as the weather is getting cooler. He overall remains stable from a cardiac standpoint.  His biggest concern is that he cannot gain any weight despite supplementation. He is trying a \"Lactose Free\" protein supplement.     Patient denies chest pain and angina. Pt denies worsening of his baseline shortness of breath, dyspnea on exertion, orthopnea, and paroxysmal nocturnal dyspnea.  He continues on 4 L supplemental oxygen at all times.      Pt denies worsening lower extremity edema. Pt denies palpitations or syncope. No recent falls. No fever or chills. No cough. No change in bowel or " "bladder habits. No travel. No sick contacts. No recent travel     Past medical history: As above.  Medications: Reviewed.  Allergies: Reviewed.  Social history: Patient reports continued cigarette use. He is currently at one half pack per day. He's been abstinent from alcohol since March 2020. He denies illicit drug use.  Family history: No sudden cardiac death or premature coronary artery disease..     Last Recorded Vitals:  Vitals:    10/10/24 1138   BP: 135/84   BP Location: Right arm   Patient Position: Sitting   Pulse: 59   SpO2: 99%   Weight: 52.6 kg (116 lb)   Height: 1.702 m (5' 7\")         Past Medical History:  He has a past medical history of Acute bronchitis (06/13/2023), Acute urinary retention (06/13/2023), Arthritis, Asteroid hyalosis, Cataract, COPD (chronic obstructive pulmonary disease) (Multi), Diabetic retinopathy (Multi), Glaucoma suspect of both eyes, Hyperlipidemia, Hypertension, Neuropathy, Other disorders of lung, Personal history of other diseases of the circulatory system (03/14/2017), Personal history of other diseases of the circulatory system, Personal history of other diseases of the respiratory system, and Personal history of other specified conditions.    Past Surgical History:  He has a past surgical history that includes Other surgical history (10/13/2021); Other surgical history (10/22/2021); Hernia repair (10/16/2017); FL guided abdominal paracentesis (07/06/2020); Cataract extraction; Colonoscopy; Upper gastrointestinal endoscopy; Other surgical history; Other surgical history; and Other surgical history.      Social History:  He reports that he has been smoking cigarettes. He has never used smokeless tobacco. He reports that he does not currently use alcohol. He reports that he does not use drugs.    Family History:  Family History   Problem Relation Name Age of Onset    Diabetes Mother      Leukemia Mother      Diabetes Father      Diabetes Brother          Allergies:  Aspirin " "and Azithromycin    Outpatient Medications:  Current Outpatient Medications   Medication Instructions    acetaminophen (TYLENOL) 650 mg, oral, Every 6 hours PRN    albuterol 90 mcg/actuation inhaler 2 puffs, inhalation, Every 4 hours PRN    allopurinol (ZYLOPRIM) 100 mg, oral, Daily RT    amLODIPine (NORVASC) 10 mg, oral, Daily RT    budesonide-glycopyr-formoterol (Breztri Aerosphere) 160-9-4.8 mcg/actuation HFA aerosol inhaler 2 puffs, inhalation, 2 times daily    calcitriol (ROCALTROL) 0.25 mcg, oral, Daily    docusate sodium (Colace) 100 mg capsule 1 capsule, oral, 2 times daily PRN    Farxiga 10 mg, oral, Daily    ferrous sulfate 325 (65 Fe) MG tablet 1 tablet, oral    finasteride (PROSCAR) 5 mg, oral, Daily    folic acid (FOLVITE) 1 mg, oral, Daily    gabapentin (NEURONTIN) 200 mg, oral, 3 times daily    ipratropium-albuteroL (Duo-Neb) 0.5-2.5 mg/3 mL nebulizer solution INHALE THE CONTENTS OF 1   VIAL VIA NEBULIZER 4 TIMES A DAY    losartan (COZAAR) 50 mg, oral, Daily    metoprolol succinate XL (TOPROL-XL) 25 mg, oral, Daily    omeprazole (PriLOSEC) 20 mg DR capsule 1 capsule, oral, Daily    oxygen (O2) therapy 4 Liter O2 - CONTINUOUS (route: Oxygen)    polyethylene glycol (GLYCOLAX, MIRALAX) 17 g, oral, Daily PRN    rosuvastatin (CRESTOR) 20 mg, oral, Nightly    tamsulosin (FLOMAX) 0.4 mg, oral, Daily       Physical Exam:  /84 (BP Location: Right arm, Patient Position: Sitting)   Pulse 59   Ht 1.702 m (5' 7\")   Wt 52.6 kg (116 lb)   SpO2 99% Comment: 4L O2  BMI 18.17 kg/m²    General:  Patient is awake, alert, and oriented.  Patient is in no acute distress.  HEENT:  Pupils equal and reactive.  Normocephalic.  Moist mucosa.    Neck:  No thyromegaly.  Normal Jugular Venous Pressure.  Cardiovascular:  Regular rate and rhythm.  Normal S1 and S2.  1/6 SYBIL.  Pulmonary:  Clear to auscultation bilaterally.  Using supplemental oxygen at 4 LPM with rest .  Pursed lip breathing.  Using accessory muscles for " breathing.  Tripod breathing positioning  Abdomen:  Soft. Non-tender.   Non-distended.  Positive bowel sounds.  Lower Extremities:  2+ pedal pulses. No LE edema.  Neurologic:  Cranial nerves intact.  No focal deficit.   Skin: Skin warm and dry, normal skin turgor.   Psychiatric: Normal affect.    Last Labs:  CBC -  Lab Results   Component Value Date    WBC 3.8 (L) 01/15/2024    HGB 12.4 (L) 01/15/2024    HCT 38.6 (L) 01/15/2024     (H) 01/15/2024     01/15/2024       CMP -  Lab Results   Component Value Date    CALCIUM 8.6 01/15/2024    PHOS 2.2 (L) 01/12/2024    PROT 7.0 01/15/2024    ALBUMIN 3.6 01/15/2024    AST 41 (H) 01/15/2024    ALT 49 01/15/2024    ALKPHOS 74 01/15/2024    BILITOT 0.7 01/15/2024       LIPID PANEL -   Lab Results   Component Value Date    CHOL 148 09/20/2024    TRIG 42 09/20/2024    HDL 79.2 09/20/2024    CHHDL 1.9 09/20/2024    LDLF 45 01/04/2023    VLDL 8 09/20/2024    NHDL 69 09/20/2024       RENAL FUNCTION PANEL -   Lab Results   Component Value Date    GLUCOSE 89 01/15/2024     01/15/2024    K 4.1 01/15/2024     01/15/2024    CO2 25 01/15/2024    ANIONGAP 13 01/15/2024    BUN 33 (H) 01/15/2024    CREATININE 2.62 (H) 01/15/2024    GFRMALE 30 (A) 08/21/2023    CALCIUM 8.6 01/15/2024    PHOS 2.2 (L) 01/12/2024    ALBUMIN 3.6 01/15/2024        Lab Results   Component Value Date    BNP 28 01/15/2024    HGBA1C 5.5 09/20/2024       Last Cardiology Tests:  ECG:  In Office EKG 10/10/2024   -- Sinus Bradycardia at 59 BPM  with LVH Changes     ECG 12 lead 01/15/2024 (Preliminary)  In office EKG1/14/2021  -- Normal sinus rhythm at 70 bpm  --T-wave inversions in V4 through V6  -- Rare PAC    Echo:  Echocardiogram 11/2022  1. Left ventricular systolic function is normal with a 55-60% estimated ejection fraction.  2. Spectral Doppler shows an impaired relaxation pattern of left ventricular diastolic filling.  3. The left atrium is moderate to severely dilated.  4. RVSP within  "normal limits.     Echocardiogram 09/2022  1. Left ventricular systolic function is normal with a 55-60% estimated ejection fraction.  2. Spectral Doppler shows an impaired relaxation pattern of left ventricular diastolic filling.  3. Trace to mild mitral valve regurgitation.      Echocardiogram 06/2020  1. The left ventricular systolic function is low normal with a 50-55% estimated ejection fraction.  2. Spectral Doppler shows an impaired relaxation pattern of left ventricular diastolic filling.  3. RVSP within normal limits     Ejection Fractions:  No results found for: \"EF\"    Cath:  No results found for this or any previous visit from the past 1095 days.      Stress Test:  Nuclear pharmacological stress testing 1/2021  FINDINGS:  Stress and rest images both demonstrate a normal distribution of  perfusion throughout all LV segments with no sign of ischemia.     TID: 1.03     ECG-gated images demonstrate normal LV size and myocardial  contractility with an LV ejection fraction of 65 % (normal above 45  percent).     IMPRESSION:  1. Normal stress myocardial perfusion imaging in response to  pharmacologic stress.  2. Well-maintained left ventricular function.    Cardiac Imaging:  No results found for this or any previous visit from the past 1095 days.        Lab review: I have personally reviewed the laboratory result(s)   Diagnostic review: I have personally reviewed the result(s) of the EKG and Echocardiogram .   Imaging review: I have  personally reviewed the result(s)     Assessment/Plan     Problem List Items Addressed This Visit       Benign essential hypertension - Primary    Overview     Currently controlled         Relevant Orders    ECG 12 lead (Clinic Performed)    Coronary artery calcification    Overview     Coronary artery calcification seen incidentally on noncardiac CT Imaging.   No Formal CT-CAC scoring          Relevant Orders    Transthoracic echo (TTE) complete    Mixed hyperlipidemia    Overview " "    The 10-year ASCVD risk score (Debra BROWN, et al., 2019) is: 49.2%    Values used to calculate the score:      Age: 74 years      Sex: Male      Is Non- : Yes      Diabetic: Yes      Tobacco smoker: Yes      Systolic Blood Pressure: 135 mmHg      Is BP treated: Yes      HDL Cholesterol: 79.2 mg/dL      Total Cholesterol: 148 mg/dL    Lab Results   Component Value Date    CHOL 148 09/20/2024    CHOL 124 01/04/2023    CHOL 108 03/16/2022     Lab Results   Component Value Date    HDL 79.2 09/20/2024    HDL 69.6 01/04/2023    HDL 54.7 03/16/2022     Lab Results   Component Value Date    LDLCALC 60 09/20/2024     Lab Results   Component Value Date    TRIG 42 09/20/2024    TRIG 49 01/04/2023    TRIG 57 03/16/2022     No components found for: \"CHOLHDL\"           Pericardial effusion (HHS-HCC)    Relevant Orders    Transthoracic echo (TTE) complete    Respiratory failure, unspecified with hypoxia (Multi)    Relevant Orders    Transthoracic echo (TTE) complete       Follow-up 4 months.  Reassess diet, weight gain  Echocariogram for RVSP / Right Heart dyusfunction in setting of COPD given multiple recent decompensations.       Gold Graham, DO  "

## 2024-10-10 NOTE — PATIENT INSTRUCTIONS
It was a pleasure seeing you today. I would like to see you back in clinic in  4  months. You can call my office if questions arise between now and our next visit.     Today, we talked about you're  breathing and your weight loss     You need to try and improve your diet as best as you are able to do   Following the Mediterranean diet means eating less prepackaged foods and instead increasing your intake of whole foods, like:    Fruits.  Vegetables.  Lean proteins (like fish, chicken and turkey).  Whole grains.  Legumes.  Healthy fats (like fatty fish, nuts, avocados and olive oil).    A COPD diet that includes a variety of foods with healthy vitamins and minerals can help to build your immunity. Known immunity-boosting foods include foods like:    Fatty fish (think salmon, trout and mackerel).  Citrus fruits (like oranges and grapefruit).  Garlic.  Ginger.  Spinach.  Bell peppers.    1. Choose the Right Kinds of Fats.  Eat This:    When eating a higher fat diet, opt for nutrient-dense options. Avocados, nuts, seeds, coconut oil, olive oil, fatty fish and cheese are good choices.    Not That:    Avoid fried foods such as French fries, fried chicken, fried fish and onion rings. When foods are fried, they become extra greasy and need more effort to digest. The bloating that results can make it difficult to take full breaths.    2. Go for Complex Carbohydrates.  Eat This:    Eating a higher fat, lower carb diet does not mean avoiding all carbs -- it means choosing the right carbs. Go for complex carbohydrates that are high in fiber to help your digestive system. This includes bran, lentils, quinoa, oats, potatoes (with the skin) and barley.    Not That:    Simple carbohydrates generally contain little nutritional value. Look at labels and avoid foods with sugar, corn syrup and high-fructose corn syrup, glucose, fructose and sucrose. This includes a lot of processed foods such as packaged snacks and cereals, white bread  and pasta.    3. Opt for the Right Fruits and Vegetables.  Eat This:    Choose a colorful diet filled with nutrient-dense fruits and vegetables. Reach for more digestible fruits and vegetables that do not cause bloating -- leafy greens, cucumbers, bell peppers, carrots, berries, pineapple and grapes.    Not That:    Fruits and vegetables have wonderful nutritional value. But, some can give people with COPD indigestion and bloating, making it harder to breathe. Pay attention to how you feel when eating the following fruits and vegetables:    Apples, apricots, peaches and melons  Cauliflower, broccoli, radishes, [place] sprouts, cabbage and kale  If you don’t notice any bloating or indigestion, continue to eat them freely.    4. Sip Drinks That Hydrate and Thin Mucus  Drink This:    Water! Unless instructed otherwise by your doctor, drink 6-8 glasses of water every day. It can help keep your mucus thin, which means it’s easier to cough up. Want something more flavorful? Try infusing your water with fresh fruit, or sip on herbal or green tea (warm or iced).    Not That:    Some people with COPD may feel symptoms worsen after drinking alcoholic or caffeinated beverages. It’s best to limit your consumption of these and stick to water as much as possible.    Alcohol and/or caffeine could interfere with your COPD medication, so be sure to consult your doctor. You should also avoid sugary drinks such as juice and soda.    5. Stick to a Nutrition Plan.  When you have COPD, it’s also important to make mealtimes as stress-free as possible. With a little planning, you can form healthy eating habits. Here are some tips and guidelines to help you stick to a COPD-friendly nutrition plan:    Eat Small Meals    Instead of eating 3 large meals, try eating 5-6 smaller meals throughout the day. This will help you avoid a full stomach, giving your diaphragm and lungs more space to move freely.    Meal Prep    When living with COPD, you  don’t want to spend too much energy preparing meals. Try breaking it up by doing meal prep in advance so you can throw meals together quickly. For example, chopping vegetables ahead of time.    Ask for Help    Recruit others to make meals easier. Ask friends and family to help with grocery shopping or meal preparation. You can also try a grocery or meal prep delivery service. These services are available for a range of budgets. Or, do some research to see if you qualify for a home delivery meal service.    Eat Slowly Now and Drink Later    If you find that you experience shortness of breath while eating, try to take your time with small bites. Chew food thoroughly, and eat while sitting upright. You can also try drinking water at the end of your meal, rather than taking sips between bites. This can help prevent bloating that can aggravate shortness of breath.      Eating Well is a Balance  Our bodies require water and a source of energy - food - regularly. We also need vitamins and minerals that our bodies cannot produce. We find these in the foods we eat.    If you have COPD, or even if you don’t, eating well is a balance. It’s important to make sure you eat enough of the foods that provide the energy, vitamins, and minerals your body needs. You must also make sure you don’t eat too much of some foods. Too much fat, salt, sugar, and sometimes even too much of some vitamins and minerals can be unhealthy.    The literature on diet and COPD is limited but there are a few references you may wish to read:    A healthy (mostly plant-based) diet is associated with less depression in COPD  REENA Saavedra., MARY ALICE Marin, Velia S. et al. The associations between dietary pattern of chronic obstructive pulmonary disease patients and depression: a cross-sectional study. BMC Pulm Med 21, 8 (2021). https://doi.org/10.1186/t60131-128-52166-9  A diet rich in antioxidants (mostly fresh, hard fruits and some vegetables), has been  "recommended for COPD patients in Role of Diet in Chronic Obstructive Pulmonary Disease Prevention and Treatment (Dyani et al. Nutrients. 2019;11(6):1357. doi: https://doi.org/10.3390/fn47992257)  An earlier study suggested a positive impact of a diet rich in fiber on COPD. This study a https://bmcpulmmed.biomedcentral.com/articles/10.1186/r42244-636-94688-6#citeasnd other small trials in COPD were reviewed by Charly Guo MD, in the 2012 blog \"Treating COPD with Diet\".  A healthy diet is primarily based on whole, unprocessed food, low in saturated fats, trans fats, cholesterol (koe-less-tur-all), salt, and added sugars.    The [place] Department of Agriculture (USDA) 8854-8438 ChooseMyPlate guidance says a healthy diet is one that includes these 5 food groups:    Fruits  Vegetables  Grains (including wheat, rice, oats, cornmeal, barley or another cereal: whole grains or refined grains)  Dairy (fat-free or low-fat milk and milk foods or a plant-based source of calcium such as fortified soy milk or tofu, dark green leafy vegetables with many vegetarian options listed in this USDA link).  Protein lean meats, poultry (chicken and turkey), fish, beans, eggs, and nuts.  The proportion is shown in the image below:    ChooseMyPlate.gov    https://www.choosemyplate.gov    For those of you interested in a plant-based diet, including those of you experiencing problems with airway mucus after eating dairy as noted on [id number], ideas for protein and calcium rich foods and recipes can be found at the following links:    [employer] website and/or MogiMeam account by Jane Zambrano MD.    If you are underweight, you should increase the number of calories you eat each day.    To add more calories to your diet:    Mix a teaspoon of olive oil into hot foods.  Use peanut butter in sandwiches and snacks.  Use honey or date syrup for sweetening food and beverages.  Drink smoothies " made with nut butters and fruits. Try adding protein powder or egg substitutes for more protein and added calories.  Keep high-calorie snacks around: olives, walnuts, raw almonds, and dried fruits.  Add flaxseed oil or olive oil to cottage cheese or non-dairy cream cheese with fruit.  Other Causes of Weight Loss  The most common reason people with COPD lose weight is that they lose their appetite. Some say they eat less because food does not taste as good as it used to. Others say they get too tired to make meals. For some, chewing, swallowing, and breathing all at the same time is just too much work. Chewing and swallowing adds to the feeling of shortness of breath.    Feeling bloated from swallowing air and not getting enough exercise can make you feel like not eating. Sometimes medicine side effects cause loss of appetite. Some medicines can cause problems with your body absorbing nutrients from food.    To improve your appetite:    Make mealtime as pleasant as possible. Listen to relaxing music.  Do not talk about stressful topics at the table.  Eat healthy snacks throughout the day. Keep them handy.  When you know you should eat but are not hungry, eat some of your favorite foods.    Better Eating Tips for Anyone with COPD  To make foods easier to chew:    Cook vegetables until they are soft.  Mince or grind meats.  Dip breads in liquid.  Eat pasta, mashed potatoes, thick soups, creamed soups, and casseroles.  Try fruit smoothies or milk shakes (there are many non-dairy options).  To decrease shortness of breath:    Try to rest 30 minutes before meals.  Use pursed-lips breathing.  Sit upright and lean forward with your elbows on the table. Put your feet on the floor. This will give you the greatest expansion of the lungs.  Ask your health care provider, if you are on continuous oxygen, if you should increase your flow rate during meals. Do not adjust your oxygen flow without talking with your health care  provider.  Relax before and after meals. Anxiety causes shortness of breath.  To reduce tiredness:    Check out “Meals on Wheels” in your community. This service (or one like it) can provide you with a nutritious, low-cost meal. This will keep you from having to prepare a meal.  Eat six small meals each day instead of three big ones. Digestion requires energy. Energy requires oxygen. If you eat smaller meals, you use less oxygen.  Eat your larger meals earlier in the day.  Rest before eating, but don’t lie down after meals.  Use easy-to-make recipes.  Ask family or friends to help with making meals.  Avoid sweets, cookies, cakes, and pies - simple carbohydrates - these can cause you to hold in too much carbon dioxide. This can cause tiredness.  To reduce bloating:    Try not to rush your meals.  Do not eat when you are short of breath. This can cause you to swallow air. This will make the bloating worse.  Drink fluids one hour before and one hour after a meal. This will decrease the amount of food in the stomach at one time.  Avoid foods such as onions, cabbage, sauerkraut, broccoli, [place] sprouts, and beer.  Eat less fried, fatty food. High-fat foods are digested slowly, causing a feeling of bloating.  Avoid lactose. It may cause bloating. It is found in milk, yogurt, cottage cheese, and fat-free sherbets.  Avoid being constipated (con-sti-pay-hilario) by adding lots of fiber and fluid to your diet.    Bull[place] Proof Coffee Recipe   ?12 ounces Brewed Coffee hot  ?2 tablespoons Ghee or good  butter ( look at kerrygold [place] citizen  butter or [employer]eterian butter)   ?1 tablespoon MCT oil  (coconut oil or similar)   ?pinch pink salt optional  ?½ teaspoon ground cinnamon optional        Please stop smoking.    --Try to cut back on the number of cigarettes that you smoke.    -- Try to increase the interval between cigarettes.   -- Try to use substitutes such as sugar-free candy carrots,celery sticks as in between.  --  Once you are down  "to less than half a pack a day try to go cold turkey.   -- Try to designate areas in the your home as smoke-free areas.   -- Try to reduce the number of ashtrays and other reminders of smoking.   -- Try to keep any major something that you dislike next to your cigarette pack to try to develop a mental aversion to smoking      Below are some Heart Health Tips that we provide to all of our patients. I hope you fing them useful.     - If you are having problems with medications, consider looking at the following websites.   --  \"GoodRx\"   --  \"[facility]\"      - We are happy to supply written prescriptions if needed to allow you to obtain your medications from different pharmacies. Additionally, if you are having issues with mail order delivery, please let us know. We can send a limited supply of your medications to your local pharmacy.     -  We recommend you follow a heart healthy diet. Watch food labels and try not to eat more than 2,500 mg of sodium per day. Avoid foods high in salt like processed meats (lunch meats, mcmanus, and sausage), processed foods (boxed dinners, canned soups), fried and fast foods. Monitor serving sizes and if the sodium per serving size is more than 200 mg, avoid those foods. If the sodium per serving size is between 100-200 mg, you can use those in limited quantities. Try to choose foods where the amount of sodium per serving size is less than 100 mg. Try to eat a diet rich in fruits and vegetables, whole grains, low fat dairy products, skinless poultry and fish, nuts, beans, non-tropical vegetable oils. Limit saturated fat, trans fat, sodium, red meats, and sugar-sweetened beverages.   Limit alcohol     -The combination of a reduced-calorie diet and increased physical activity is recommended. Adults should aim to get at least 150 minutes of moderate physical activity per week (30 minutes of moderate physical activities at least 5 days per week). Examples of moderate " physical activities include brisk walking, swimming, aerobic dancing, heavy gardening, jumping rope, bicycling 10 MPH or faster, tennis, hiking uphill or with a heavy backpack. Please let us know if you would like to learn more about your nutrition and calories and additional options including weight loss programs to help you reach your goal.     -If you smoke, stop smoking. If you stop smoking you can help get rid of a major source of stress to your heart. Smoking makes your heart rate and blood pressure go up and increases your risk or developing cardiovascular diseases and worsen symptoms associated with heart failure.     -Obtain a BP monitor and monitor your BP daily. Check it around the same time each day; at least 1 hour after taking your medications. Record your BP in a log and bring your log with you to your doctors appointment.     -F/u with your PCP as recommended.

## 2024-10-11 ENCOUNTER — LAB (OUTPATIENT)
Dept: LAB | Facility: LAB | Age: 74
End: 2024-10-11
Payer: COMMERCIAL

## 2024-10-11 DIAGNOSIS — N18.4 STAGE 4 CHRONIC KIDNEY DISEASE (MULTI): ICD-10-CM

## 2024-10-11 LAB — 25(OH)D3 SERPL-MCNC: 34 NG/ML (ref 30–100)

## 2024-10-11 PROCEDURE — 82306 VITAMIN D 25 HYDROXY: CPT

## 2024-10-11 PROCEDURE — 36415 COLL VENOUS BLD VENIPUNCTURE: CPT

## 2024-10-11 PROCEDURE — 83970 ASSAY OF PARATHORMONE: CPT

## 2024-10-11 PROCEDURE — 80069 RENAL FUNCTION PANEL: CPT

## 2024-10-12 LAB
ALBUMIN SERPL BCP-MCNC: 4.7 G/DL (ref 3.4–5)
ANION GAP SERPL CALC-SCNC: 15 MMOL/L (ref 10–20)
BUN SERPL-MCNC: 30 MG/DL (ref 6–23)
CALCIUM SERPL-MCNC: 10.3 MG/DL (ref 8.6–10.6)
CHLORIDE SERPL-SCNC: 107 MMOL/L (ref 98–107)
CO2 SERPL-SCNC: 28 MMOL/L (ref 21–32)
CREAT SERPL-MCNC: 2.27 MG/DL (ref 0.5–1.3)
EGFRCR SERPLBLD CKD-EPI 2021: 30 ML/MIN/1.73M*2
GLUCOSE SERPL-MCNC: 92 MG/DL (ref 74–99)
PHOSPHATE SERPL-MCNC: 3.8 MG/DL (ref 2.5–4.9)
POTASSIUM SERPL-SCNC: 4.9 MMOL/L (ref 3.5–5.3)
PTH-INTACT SERPL-MCNC: 79.4 PG/ML (ref 18.5–88)
SODIUM SERPL-SCNC: 145 MMOL/L (ref 136–145)

## 2024-10-14 ENCOUNTER — APPOINTMENT (OUTPATIENT)
Dept: NEPHROLOGY | Facility: CLINIC | Age: 74
End: 2024-10-14
Payer: COMMERCIAL

## 2024-10-14 ENCOUNTER — APPOINTMENT (OUTPATIENT)
Dept: LAB | Facility: LAB | Age: 74
End: 2024-10-14
Payer: COMMERCIAL

## 2024-10-14 VITALS
OXYGEN SATURATION: 96 % | TEMPERATURE: 97.9 F | SYSTOLIC BLOOD PRESSURE: 121 MMHG | BODY MASS INDEX: 18.29 KG/M2 | WEIGHT: 116.8 LBS | DIASTOLIC BLOOD PRESSURE: 73 MMHG | HEART RATE: 70 BPM | RESPIRATION RATE: 18 BRPM

## 2024-10-14 DIAGNOSIS — N18.4 STAGE 4 CHRONIC KIDNEY DISEASE (MULTI): Primary | ICD-10-CM

## 2024-10-14 LAB
CREAT UR-MCNC: 98 MG/DL (ref 20–370)
MICROALBUMIN UR-MCNC: 160 MG/L
MICROALBUMIN/CREAT UR: 163.3 UG/MG CREAT

## 2024-10-14 PROCEDURE — 3048F LDL-C <100 MG/DL: CPT | Performed by: INTERNAL MEDICINE

## 2024-10-14 PROCEDURE — 3074F SYST BP LT 130 MM HG: CPT | Performed by: INTERNAL MEDICINE

## 2024-10-14 PROCEDURE — 1157F ADVNC CARE PLAN IN RCRD: CPT | Performed by: INTERNAL MEDICINE

## 2024-10-14 PROCEDURE — 3044F HG A1C LEVEL LT 7.0%: CPT | Performed by: INTERNAL MEDICINE

## 2024-10-14 PROCEDURE — 1126F AMNT PAIN NOTED NONE PRSNT: CPT | Performed by: INTERNAL MEDICINE

## 2024-10-14 PROCEDURE — 1159F MED LIST DOCD IN RCRD: CPT | Performed by: INTERNAL MEDICINE

## 2024-10-14 PROCEDURE — 99213 OFFICE O/P EST LOW 20 MIN: CPT | Performed by: INTERNAL MEDICINE

## 2024-10-14 PROCEDURE — 3060F POS MICROALBUMINURIA REV: CPT | Performed by: INTERNAL MEDICINE

## 2024-10-14 PROCEDURE — 82570 ASSAY OF URINE CREATININE: CPT

## 2024-10-14 PROCEDURE — 3078F DIAST BP <80 MM HG: CPT | Performed by: INTERNAL MEDICINE

## 2024-10-14 PROCEDURE — 82043 UR ALBUMIN QUANTITATIVE: CPT

## 2024-10-14 PROCEDURE — 4010F ACE/ARB THERAPY RXD/TAKEN: CPT | Performed by: INTERNAL MEDICINE

## 2024-10-14 PROCEDURE — 1123F ACP DISCUSS/DSCN MKR DOCD: CPT | Performed by: INTERNAL MEDICINE

## 2024-10-14 ASSESSMENT — PAIN SCALES - GENERAL: PAINLEVEL: 0-NO PAIN

## 2024-10-14 NOTE — PROGRESS NOTES
"For follow up, doing well.  No complaints except ongoing weight loss  No hospitalizations/illness since last visit  Not checking BP at home  Denies orthostatic symptoms    RoS negative for all other systems except as noted above.        2/14/2024    10:21 AM 5/9/2024    11:37 AM 6/10/2024    11:12 AM 6/17/2024    11:28 AM 9/17/2024    12:39 PM 10/10/2024    11:38 AM 10/14/2024    11:33 AM   Vitals   Systolic  125 116 125 128 135 121   Diastolic  68 74 76 76 84 73   Heart Rate 67  71 71 70 59 70   Temp    36.9 °C (98.4 °F)   36.6 °C (97.9 °F)   Resp       18   Height (in) 1.702 m (5' 7\") 1.702 m (5' 7\") 1.702 m (5' 7\") 1.702 m (5' 7\") 1.702 m (5' 7\") 1.702 m (5' 7\")    Weight (lb) 122.12 117.4 112.8 111.6 120 116 116.8   BMI 19.13 kg/m2 18.39 kg/m2 17.67 kg/m2 17.48 kg/m2 18.79 kg/m2 18.17 kg/m2 18.29 kg/m2   BSA (m2) 1.62 m2 1.59 m2 1.56 m2 1.55 m2 1.6 m2 1.58 m2 1.58 m2   Visit Report Report Report Report Report Report Report Report     Cachectic, on oxygen by nasal cannula  HEENT:  moist, no pallor  No edema khurram LE  Breath sounds khurram distant, khurram coarse crackles and wheezing +  S1 S2 regular, normal, no rub or murmur  Abdomen soft  AAO x3, non focal    Lab Results   Component Value Date     10/11/2024     01/15/2024     01/12/2024    K 4.9 10/11/2024    K 4.1 01/15/2024    K 4.1 01/12/2024     10/11/2024     01/15/2024     01/12/2024    CO2 28 10/11/2024    CO2 25 01/15/2024    CO2 27 01/12/2024    BUN 30 (H) 10/11/2024    BUN 33 (H) 01/15/2024    BUN 36 (H) 01/12/2024    CREATININE 2.27 (H) 10/11/2024    CREATININE 2.62 (H) 01/15/2024    CREATININE 2.89 (H) 01/12/2024    CALCIUM 10.3 10/11/2024    CALCIUM 8.6 01/15/2024    CALCIUM 10.0 01/12/2024    PHOS 3.8 10/11/2024    PHOS 2.2 (L) 01/12/2024    PHOS 3.9 08/21/2023    VITD25 34 10/11/2024    VITD25 35 01/12/2024    VITD25 38 01/04/2023    MICROALBCREA 163.3 (H) 10/14/2024    MICROALBCREA 95.8 (H) 01/12/2024    MICROALBCREA " 127.0 (H) 08/21/2023    HGB 12.4 (L) 01/15/2024    HGB 11.9 (L) 07/06/2023    HGB 12.1 (L) 07/05/2023      74-year-old with history of solitary kidney, hypertension, nephrolithiasis, prior episodes of acute kidney injury now with chronic kidney disease      #Chronic kidney disease stage G3 B A2: Creatinine on 10/11/24 was 2.27 mg per DL, EGFR 30 mill per minute, stable. Tolerating Farxiga well, continue for now. Working with  pharmacy to get Farxiga/ Jardiance   Has d/w PCP for concerns re weight loss     #Bone mineral metabolism: Calcium 10.3 , albumin 4.7 ct calcitriol 0.25 mcg daily     #Hypertension: Goal blood pressure less than 120/80, at goal. Continue amlodipine 10 mg/day, losartan 50 mg once a day and metoprolol succinate XL 25 mg once a day.   RTC: 4-5 mo

## 2024-10-21 DIAGNOSIS — F10.11 HISTORY OF ALCOHOL ABUSE: ICD-10-CM

## 2024-10-21 DIAGNOSIS — N40.0 BENIGN PROSTATIC HYPERPLASIA, UNSPECIFIED WHETHER LOWER URINARY TRACT SYMPTOMS PRESENT: ICD-10-CM

## 2024-10-23 RX ORDER — TAMSULOSIN HYDROCHLORIDE 0.4 MG/1
0.4 CAPSULE ORAL DAILY
Qty: 90 CAPSULE | Refills: 1 | Status: SHIPPED | OUTPATIENT
Start: 2024-10-23

## 2024-10-23 RX ORDER — FOLIC ACID 1 MG/1
1 TABLET ORAL DAILY
Qty: 90 TABLET | Refills: 1 | Status: SHIPPED | OUTPATIENT
Start: 2024-10-23

## 2024-10-25 PROCEDURE — RXMED WILLOW AMBULATORY MEDICATION CHARGE

## 2024-10-29 ENCOUNTER — PHARMACY VISIT (OUTPATIENT)
Dept: PHARMACY | Facility: CLINIC | Age: 74
End: 2024-10-29
Payer: MEDICARE

## 2024-11-20 ENCOUNTER — APPOINTMENT (OUTPATIENT)
Dept: PRIMARY CARE | Facility: CLINIC | Age: 74
End: 2024-11-20
Payer: COMMERCIAL

## 2024-12-26 DIAGNOSIS — G62.9 NEUROPATHY: ICD-10-CM

## 2024-12-26 RX ORDER — GABAPENTIN 100 MG/1
200 CAPSULE ORAL 3 TIMES DAILY
Qty: 540 CAPSULE | Refills: 0 | Status: SHIPPED | OUTPATIENT
Start: 2024-12-26

## 2025-01-07 ENCOUNTER — HOSPITAL ENCOUNTER (EMERGENCY)
Facility: HOSPITAL | Age: 75
Discharge: HOME | End: 2025-01-07
Attending: EMERGENCY MEDICINE
Payer: MEDICARE

## 2025-01-07 ENCOUNTER — APPOINTMENT (OUTPATIENT)
Dept: RADIOLOGY | Facility: HOSPITAL | Age: 75
End: 2025-01-07
Payer: MEDICARE

## 2025-01-07 ENCOUNTER — APPOINTMENT (OUTPATIENT)
Dept: CARDIOLOGY | Facility: HOSPITAL | Age: 75
End: 2025-01-07
Payer: MEDICARE

## 2025-01-07 VITALS
DIASTOLIC BLOOD PRESSURE: 70 MMHG | BODY MASS INDEX: 18.52 KG/M2 | SYSTOLIC BLOOD PRESSURE: 130 MMHG | HEIGHT: 67 IN | OXYGEN SATURATION: 97 % | HEART RATE: 65 BPM | RESPIRATION RATE: 16 BRPM | WEIGHT: 118 LBS | TEMPERATURE: 98.2 F

## 2025-01-07 DIAGNOSIS — J44.1 ACUTE EXACERBATION OF CHRONIC OBSTRUCTIVE PULMONARY DISEASE (COPD) (MULTI): Primary | ICD-10-CM

## 2025-01-07 DIAGNOSIS — F17.200 TOBACCO DEPENDENCE: ICD-10-CM

## 2025-01-07 LAB
FLUAV RNA RESP QL NAA+PROBE: NOT DETECTED
FLUBV RNA RESP QL NAA+PROBE: NOT DETECTED
SARS-COV-2 RNA RESP QL NAA+PROBE: NOT DETECTED

## 2025-01-07 PROCEDURE — 71046 X-RAY EXAM CHEST 2 VIEWS: CPT

## 2025-01-07 PROCEDURE — 2500000002 HC RX 250 W HCPCS SELF ADMINISTERED DRUGS (ALT 637 FOR MEDICARE OP, ALT 636 FOR OP/ED): Performed by: EMERGENCY MEDICINE

## 2025-01-07 PROCEDURE — 87636 SARSCOV2 & INF A&B AMP PRB: CPT | Performed by: EMERGENCY MEDICINE

## 2025-01-07 PROCEDURE — 99285 EMERGENCY DEPT VISIT HI MDM: CPT | Mod: 25 | Performed by: EMERGENCY MEDICINE

## 2025-01-07 PROCEDURE — 94640 AIRWAY INHALATION TREATMENT: CPT

## 2025-01-07 PROCEDURE — 71046 X-RAY EXAM CHEST 2 VIEWS: CPT | Performed by: RADIOLOGY

## 2025-01-07 PROCEDURE — 93005 ELECTROCARDIOGRAM TRACING: CPT

## 2025-01-07 PROCEDURE — 2500000004 HC RX 250 GENERAL PHARMACY W/ HCPCS (ALT 636 FOR OP/ED): Performed by: EMERGENCY MEDICINE

## 2025-01-07 RX ORDER — PREDNISONE 20 MG/1
60 TABLET ORAL ONCE
Status: COMPLETED | OUTPATIENT
Start: 2025-01-07 | End: 2025-01-07

## 2025-01-07 RX ORDER — IPRATROPIUM BROMIDE AND ALBUTEROL SULFATE 2.5; .5 MG/3ML; MG/3ML
3 SOLUTION RESPIRATORY (INHALATION) ONCE
Status: COMPLETED | OUTPATIENT
Start: 2025-01-07 | End: 2025-01-07

## 2025-01-07 RX ORDER — PREDNISONE 50 MG/1
50 TABLET ORAL DAILY
Qty: 5 TABLET | Refills: 0 | Status: SHIPPED | OUTPATIENT
Start: 2025-01-07 | End: 2025-01-12

## 2025-01-07 RX ORDER — PREDNISONE 50 MG/1
50 TABLET ORAL DAILY
Qty: 5 TABLET | Refills: 0 | Status: SHIPPED | OUTPATIENT
Start: 2025-01-07 | End: 2025-01-07

## 2025-01-07 RX ADMIN — PREDNISONE 60 MG: 20 TABLET ORAL at 13:19

## 2025-01-07 RX ADMIN — IPRATROPIUM BROMIDE AND ALBUTEROL SULFATE 3 ML: 2.5; .5 SOLUTION RESPIRATORY (INHALATION) at 13:20

## 2025-01-07 ASSESSMENT — PAIN SCALES - GENERAL: PAINLEVEL_OUTOF10: 0 - NO PAIN

## 2025-01-07 ASSESSMENT — PAIN - FUNCTIONAL ASSESSMENT: PAIN_FUNCTIONAL_ASSESSMENT: 0-10

## 2025-01-07 NOTE — ED PROVIDER NOTES
HPI   Chief Complaint   Patient presents with    Shortness of Breath     Pt presents to ed via self for complaints of SOB x 1 day. Pt does have hx of copd and wears oxygen. States that he has had increased sob and mucus production. Denies fevers, chills.       74-year-old male presents for evaluation of shortness of breath that started this morning.  He does have baseline COPD continues to smoke and wears 4-4.5 L of oxygen at baseline.  This morning he felt slightly more congested with more mucus production than normal.  No fever.  No chest pain.  No leg pain or swelling.  No other URI symptoms.  States he did use his home nebulizer with some improvement.      History provided by:  Patient and medical records          Patient History   Past Medical History:   Diagnosis Date    Acute bronchitis 06/13/2023    Acute urinary retention 06/13/2023    Arthritis     Asteroid hyalosis     Cataract     COPD (chronic obstructive pulmonary disease) (Multi)     4-L O2 NC at all times    Diabetic retinopathy (Multi)     Glaucoma suspect of both eyes     Hyperlipidemia     Hypertension     Neuropathy     Other disorders of lung     Lung trouble    Personal history of other diseases of the circulatory system 03/14/2017    History of abnormal electrocardiography    Personal history of other diseases of the circulatory system     History of essential hypertension    Personal history of other diseases of the respiratory system     History of chronic obstructive lung disease    Personal history of other specified conditions     History of shortness of breath     Past Surgical History:   Procedure Laterality Date    CATARACT EXTRACTION      left eye    COLONOSCOPY      FL GUIDED ABDOMINAL PARACENTESIS  07/06/2020    FL GUIDED ABDOMINAL PARACENTESIS 7/6/2020 [facility]IENT LEGAC[facility]    HERNIA REPAIR  10/16/2017    Hernia Repair Inguinal Sliding    OTHER SURGICAL HISTORY  10/13/2021    Lung wedge resection    OTHER SURGICAL HISTORY   10/22/2021    Lung wedge resection    OTHER SURGICAL HISTORY      bronchoscopy    OTHER SURGICAL HISTORY      left kidney removed (cancer)    OTHER SURGICAL HISTORY      partial thyroidectomy    UPPER GASTROINTESTINAL ENDOSCOPY       Family History   Problem Relation Name Age of Onset    Diabetes Mother      Leukemia Mother      Diabetes Father      Diabetes Brother       Social History     Tobacco Use    Smoking status: Every Day     Current packs/day: 0.50     Types: Cigarettes    Smokeless tobacco: Never    Tobacco comments:     Gets SOB with activity/stairs if not wearing O2 (has COPD). Can lay flat. Uses a cane. No illness last 30 days. Denies any personal or family member reaction to anesthesia.    Vaping Use    Vaping status: Never Used   Substance Use Topics    Alcohol use: Not Currently     Comment: quit drinking 6 years ago.    Drug use: Never       Physical Exam   ED Triage Vitals [01/07/25 1302]   Temperature Heart Rate Respirations BP   36.8 °C (98.2 °F) 65 16 130/70      Pulse Ox Temp src Heart Rate Source Patient Position   97 % -- -- --      BP Location FiO2 (%)     -- --       Physical Exam  Vitals and nursing note reviewed.     General: Vitals reviewed. Awake, alert, well-developed, well-nourished, NAD  HEENT: NC/AT, PERRL, MMM  Neck: Supple, trachea midline  Respiratory: No respiratory distress, begin full sentences, no accessory muscle usage, lungs with diminished air entry throughout, prolonged expiratory phase and expiratory wheezes scattered throughout bilateral lung fields, no focal deficits, no rhonchi or rales  CV: Regular rate and regular rhythm, no murmur/gallop/rubs  Abdomen/GI: Soft, non-tender, non-distended, no rebound, guarding, or rigidity, normal bowel sounds  Extremities: Moving all extremities, no deformities no peripheral edema  Neuro: A/O, normal speech  Skin: Warm, dry. No rashes identified      ED Course & MDM   ED Course as of 01/07/25 1429   Tue Jan 07, 2025   1326 EKG on  my independent interpretation: Sinus bradycardia 53 bpm, normal axis, normal intervals, T wave inversion in aVL, V5-V6, V5 is new may be related to lead positioning aVL and V6 are unchanged compared to prior EKG 10/10/2024 [FUAD]      ED Course User Index  [FUAD] Princess Lynne MD         Diagnoses as of 01/07/25 1429   Acute exacerbation of chronic obstructive pulmonary disease (COPD) (Multi)   Tobacco dependence                 No data recorded     [place] Coma Scale Score: 15 (01/07/25 1302 : Brian Paladino, DRU)                           Medical Decision Making  74-year-old male with known COPD presents for shortness of breath.  No chest pain to suggest ACS screening EKG on my independent interpreted exam with no acute ischemic change.  He does have diminished air entry and expiratory wheezes most consistent with COPD exacerbation.  Consider weather related, underlying viral etiology as causes of exacerbation.  Patient given prednisone, DuoNeb with improvement.  Negative influenza and COVID-19.  Chest x-ray on my independent potation encumber by radiologist with COPD but no evidence of pneumonia or other acute process, does have slight blunting of left costophrenic angle unchanged from prior.  Patient improved after DuoNeb at this time do feel appropriate for outpatient management.  Will treat with course of prednisone for COPD exacerbation.  Counseled greater than 3-minute smoking cessation.  Return and follow-up instructions discussed.    Amount and/or Complexity of Data Reviewed  External Data Reviewed: notes.     Details: 9/17/2024 outpatient primary care note reviewed  Labs: ordered. Decision-making details documented in ED Course.  Radiology: ordered and independent interpretation performed. Decision-making details documented in ED Course.  ECG/medicine tests: ordered and independent interpretation performed. Decision-making details documented in ED Course.        Procedure  Procedures     Princess BARR  MD Maged  01/07/25 9513

## 2025-01-07 NOTE — DISCHARGE INSTRUCTIONS
The cause of your symptoms is exacerbation of COPD.  Continue your home breathing treatments/inhalers as prescribed and as needed.  Take prednisone as prescribed until gone.  Return immediately if you become more short of breath or have other symptoms that concern you.    Recommend quitting smoking as soon as possible as this can prevent worsening of your COPD and symptoms.

## 2025-01-08 ENCOUNTER — TELEMEDICINE (OUTPATIENT)
Dept: PHARMACY | Facility: HOSPITAL | Age: 75
End: 2025-01-08
Payer: MEDICARE

## 2025-01-08 DIAGNOSIS — J44.9 CHRONIC OBSTRUCTIVE PULMONARY DISEASE, UNSPECIFIED COPD TYPE (MULTI): ICD-10-CM

## 2025-01-08 DIAGNOSIS — J44.9 CHRONIC OBSTRUCTIVE PULMONARY DISEASE, UNSPECIFIED COPD TYPE (MULTI): Primary | ICD-10-CM

## 2025-01-08 LAB
ATRIAL RATE: 53 BPM
P AXIS: 80 DEGREES
P OFFSET: 203 MS
P ONSET: 154 MS
PR INTERVAL: 134 MS
Q ONSET: 221 MS
QRS COUNT: 9 BEATS
QRS DURATION: 86 MS
QT INTERVAL: 444 MS
QTC CALCULATION(BAZETT): 416 MS
QTC FREDERICIA: 426 MS
R AXIS: 86 DEGREES
T AXIS: 90 DEGREES
T OFFSET: 443 MS
VENTRICULAR RATE: 53 BPM

## 2025-01-08 PROCEDURE — RXMED WILLOW AMBULATORY MEDICATION CHARGE

## 2025-01-08 RX ORDER — BUDESONIDE, GLYCOPYRROLATE, AND FORMOTEROL FUMARATE 160; 9; 4.8 UG/1; UG/1; UG/1
2 AEROSOL, METERED RESPIRATORY (INHALATION) 2 TIMES DAILY
Qty: 32.1 G | Refills: 2 | Status: SHIPPED | OUTPATIENT
Start: 2025-01-08

## 2025-01-08 NOTE — PROGRESS NOTES
Clinical Pharmacy Appointment    Patient ID: Charly Cornejo is a 74 y.o. male who presents for COPD.    Pt is here for Follow Up appointment.     Referring Provider: Jermaine Jimenez DO  PCP: Jermaine Jimenez DO   Last visit with PCP: 9/17/24   Next visit with PCP: 3/17/25    Subjective     HPI  COPD  Patient has been diagnosed with: Emphysema  Does patient see pulmonology: Yes    Date: 1/16/25  has had PFT's completed in last 2 years    Current Regimen  Breztri 2 puffs twice daily  Duo Neb 4 times daily as needed  Albuterol prn    Clarifications to above regimen: Patient uses Duo Neb about twice daily   Adverse Effects: None   Appropriate technique? Yes    Symptom Management  Current symptoms: dyspnea and increased sputum  Triggers: Exertion  Alleviating factors: Rest and nebulizer    Exacerbation Hx  When was your last hospitalization for an exacerbation? May 2022  When was the last time you were treated with antibiotics and/or steroids? 1/7/25     Rescue Inhaler Use  How often do you use your rescue inhaler? Once a week or less  Does use Duo Neb about twice a day    Immunization History:  Influenza: 9/17/24  PCV20: 11/7/23  COVID: 9/24/22  RSV: None    Smoking History  He currently smokes  0.5  packs per day.    Drug Interactions  No relevant drug interactions were noted.    Medication System Management  Patient's preferred pharmacy: [facility]  Adherence/Organization: No issues  Affordability/Accessibility: Farxiga covered under [facility] PAP. Requesting assistance with Breztri.    Objective   Allergies   Allergen Reactions    Aspirin Nausea And Vomiting and Other     GI Upset    Azithromycin Rash     Social History     Social History Narrative    Not on file      Medication Review  Current Outpatient Medications   Medication Instructions    acetaminophen (TYLENOL) 650 mg, oral, Every 6 hours PRN    albuterol 90 mcg/actuation inhaler 2 puffs, inhalation, Every 4 hours PRN    allopurinol (ZYLOPRIM) 100 mg, oral,  Daily RT    amLODIPine (NORVASC) 10 mg, oral, Daily RT    budesonide-glycopyr-formoterol (Breztri Aerosphere) 160-9-4.8 mcg/actuation HFA aerosol inhaler 2 puffs, inhalation, 2 times daily    calcitriol (ROCALTROL) 0.25 mcg, oral, Daily    docusate sodium (Colace) 100 mg capsule 1 capsule, oral, 2 times daily PRN    Farxiga 10 mg, oral, Daily    ferrous sulfate 325 (65 Fe) MG tablet 1 tablet, oral    finasteride (PROSCAR) 5 mg, oral, Daily    folic acid (FOLVITE) 1 mg, oral, Daily    gabapentin (NEURONTIN) 200 mg, oral, 3 times daily    ipratropium-albuteroL (Duo-Neb) 0.5-2.5 mg/3 mL nebulizer solution INHALE THE CONTENTS OF 1   VIAL VIA NEBULIZER 4 TIMES A DAY    losartan (COZAAR) 50 mg, oral, Daily    metoprolol succinate XL (TOPROL-XL) 25 mg, oral, Daily    omeprazole (PriLOSEC) 20 mg DR capsule 1 capsule, oral, Daily    oxygen (O2) therapy 4 Liter O2 - CONTINUOUS (route: Oxygen)    polyethylene glycol (GLYCOLAX, MIRALAX) 17 g, oral, Daily PRN    predniSONE (DELTASONE) 50 mg, oral, Daily    rosuvastatin (CRESTOR) 20 mg, oral, Nightly    tamsulosin (FLOMAX) 0.4 mg, oral, Daily      Vitals  BP Readings from Last 2 Encounters:   01/07/25 130/70   10/14/24 121/73     BMI Readings from Last 1 Encounters:   01/07/25 18.48 kg/m²      Labs  A1C  Lab Results   Component Value Date    HGBA1C 5.5 09/20/2024    HGBA1C 4.9 05/07/2023    HGBA1C 5.3 01/04/2023     BMP  Lab Results   Component Value Date    CALCIUM 10.3 10/11/2024     10/11/2024    K 4.9 10/11/2024    CO2 28 10/11/2024     10/11/2024    BUN 30 (H) 10/11/2024    CREATININE 2.27 (H) 10/11/2024    EGFR 30 (L) 10/11/2024     LFTs  Lab Results   Component Value Date    ALT 49 01/15/2024    AST 41 (H) 01/15/2024    ALKPHOS 74 01/15/2024    BILITOT 0.7 01/15/2024     FLP  Lab Results   Component Value Date    TRIG 42 09/20/2024    CHOL 148 09/20/2024    LDLF 45 01/04/2023    LDLCALC 60 09/20/2024    HDL 79.2 09/20/2024     Urine Microalbumin  Lab Results    Component Value Date    MICROALBCREA 163.3 (H) 10/14/2024     Weight Management  Wt Readings from Last 3 Encounters:   01/07/25 53.5 kg (118 lb)   10/14/24 53 kg (116 lb 12.8 oz)   10/10/24 52.6 kg (116 lb)      There is no height or weight on file to calculate BMI.     Assessment/Plan   Problem List Items Addressed This Visit       COPD (chronic obstructive pulmonary disease) (Multi)     Patient with COPD that is stable and needs to quit smoking. Based on CAT score, exacerbation history, and eosinophil count, patient is GOLD Group E and triple therapy is recommended. He uses oxygen continuously. He went to the ED yesterday for shortness of breath and increased mucus production. He received a nebulizer treatment and discharged with a 5-day supply of prednisone. States he feels better today. He is not satisfied with pulmonology at [facility], but will attend next visit on 1/16 before deciding if he will change to  care. Discussed importance of complete tobacco cessation for lung health, but patient is currently not interested.    Medication Changes:  CONTINUE  Breztri 160-9-4.8 mcg/actuation HFA inhaler 2 puffs twice daily  Albuterol 2 puffs every 4 hours prn  Duo-Neb up to 4 times daily         Relevant Medications    budesonide-glycopyr-formoterol (Breztri Aerosphere) 160-9-4.8 mcg/actuation HFA aerosol inhaler     Clinical Pharmacist follow-up: September/October 2025, Telehealth visit    Continue all meds under the continuation of care with the referring provider and clinical pharmacy team.    Thank you,  Olinda Lyons, PharmD  Clinical Pharmacist  [phone]    Verbal consent to manage patient's drug therapy was obtained from the patient. They were informed they may decline to participate or withdraw from participation in pharmacy services at any time.

## 2025-01-08 NOTE — ASSESSMENT & PLAN NOTE
Patient with COPD that is stable and needs to quit smoking. Based on CAT score, exacerbation history, and eosinophil count, patient is GOLD Group E and triple therapy is recommended. He uses oxygen continuously. He went to the ED yesterday for shortness of breath and increased mucus production. He received a nebulizer treatment and discharged with a 5-day supply of prednisone. States he feels better today. He is not satisfied with pulmonology at [facility], but will attend next visit on 1/16 before deciding if he will change to  care. Discussed importance of complete tobacco cessation for lung health, but patient is currently not interested.    Medication Changes:  CONTINUE  Breztri 160-9-4.8 mcg/actuation HFA inhaler 2 puffs twice daily  Albuterol 2 puffs every 4 hours prn  Duo-Neb up to 4 times daily

## 2025-01-10 ENCOUNTER — PHARMACY VISIT (OUTPATIENT)
Dept: PHARMACY | Facility: CLINIC | Age: 75
End: 2025-01-10
Payer: MEDICARE

## 2025-02-03 ENCOUNTER — PATIENT OUTREACH (OUTPATIENT)
Dept: PRIMARY CARE | Facility: CLINIC | Age: 75
End: 2025-02-03
Payer: MEDICARE

## 2025-02-03 NOTE — PROGRESS NOTES
Discharge [facility]  Discharge Diagnosis:COPD  Admission Date:1/24/25  Discharge Date: 1/31/25    PCP Appointment Date:none available sent to pcp office  Specialist Appointment Date:   Hospital Encounter and Summary Linked: NO  See discharge assessment below for further details  Wrap Up  Wrap Up Additional Comments: discused discharge patient stated that hes improving provided contact information encouragedcall with queastions. (2/3/2025 11:00 AM)  Call End Time: 1104 (2/3/2025 11:00 AM)    Engagement  Call Start Time: 1100 (2/3/2025 11:00 AM)    Medications  Medications reviewed with patient/caregiver?: Yes (no new meds) (2/3/2025 11:00 AM)  Is the patient having any side effects they believe may be caused by any medication additions or changes?: No (2/3/2025 11:00 AM)  Does the patient have all medications ordered at discharge?: Not applicable (2/3/2025 11:00 AM)  Is the patient taking all medications as directed (includes completed medication regime)?: Yes (2/3/2025 11:00 AM)    Appointments  Does the patient have a primary care provider?: Yes (2/3/2025 11:00 AM)  Care Management Interventions: Advised patient to make appointment (2/3/2025 11:00 AM)  Has the patient kept scheduled appointments due by today?: Yes (2/3/2025 11:00 AM)    Self Management  What is the home health agency?: n/a (2/3/2025 11:00 AM)  Has home health visited the patient within 72 hours of discharge?: Not applicable (2/3/2025 11:00 AM)  What Durable Medical Equipment (DME) was ordered?: n/a (2/3/2025 11:00 AM)  Has all Durable Medical Equipment (DME) been delivered?: No (2/3/2025 11:00 AM)    Patient Teaching  Does the patient have access to their discharge instructions?: Yes (2/3/2025 11:00 AM)  Care Management Interventions: Reviewed instructions with patient (2/3/2025 11:00 AM)  What is the patient's perception of their health status since discharge?: Improving (2/3/2025 11:00 AM)  Is the patient/caregiver able to  teach back the hierarchy of who to call/visit for symptoms/problems? PCP, Specialist, Home Health nurse, Urgent Care, ED, 911: Yes (2/3/2025 11:00 AM)

## 2025-02-10 NOTE — TELEPHONE ENCOUNTER
Requesting refill of gabapentin  [place] reviewed 12/26/23  I have personally reviewed the [place]AR[place] report and I have considered the risk of abuse, dependence, addiction, and diversion. I believe it is clinically appropriate for this patient to be prescribed this medication based on documented diagnosis  Last fill 10/12/23 x 90 days  
Detail Level: Zone
Detail Level: Generalized

## 2025-02-12 ENCOUNTER — APPOINTMENT (OUTPATIENT)
Dept: PRIMARY CARE | Facility: CLINIC | Age: 75
End: 2025-02-12
Payer: MEDICARE

## 2025-02-12 VITALS
DIASTOLIC BLOOD PRESSURE: 69 MMHG | WEIGHT: 113 LBS | BODY MASS INDEX: 17.7 KG/M2 | SYSTOLIC BLOOD PRESSURE: 104 MMHG | HEART RATE: 83 BPM | OXYGEN SATURATION: 93 %

## 2025-02-12 DIAGNOSIS — K74.69 OTHER CIRRHOSIS OF LIVER: ICD-10-CM

## 2025-02-12 DIAGNOSIS — E21.3 HYPERPARATHYROIDISM (MULTI): ICD-10-CM

## 2025-02-12 DIAGNOSIS — C64.9 RENAL CELL CARCINOMA, UNSPECIFIED LATERALITY (MULTI): ICD-10-CM

## 2025-02-12 DIAGNOSIS — K86.1 CHRONIC PANCREATITIS, UNSPECIFIED PANCREATITIS TYPE (MULTI): ICD-10-CM

## 2025-02-12 DIAGNOSIS — F10.11 HISTORY OF ALCOHOL ABUSE: ICD-10-CM

## 2025-02-12 DIAGNOSIS — E11.41 DIABETIC MONONEUROPATHY ASSOCIATED WITH TYPE 2 DIABETES MELLITUS (MULTI): ICD-10-CM

## 2025-02-12 DIAGNOSIS — J44.9 CHRONIC OBSTRUCTIVE PULMONARY DISEASE, UNSPECIFIED COPD TYPE (MULTI): Primary | ICD-10-CM

## 2025-02-12 DIAGNOSIS — N18.32 CHRONIC KIDNEY DISEASE, STAGE 3B (MULTI): ICD-10-CM

## 2025-02-12 DIAGNOSIS — N40.0 BENIGN PROSTATIC HYPERPLASIA, UNSPECIFIED WHETHER LOWER URINARY TRACT SYMPTOMS PRESENT: ICD-10-CM

## 2025-02-12 PROCEDURE — 4010F ACE/ARB THERAPY RXD/TAKEN: CPT | Performed by: INTERNAL MEDICINE

## 2025-02-12 PROCEDURE — G2211 COMPLEX E/M VISIT ADD ON: HCPCS | Performed by: INTERNAL MEDICINE

## 2025-02-12 PROCEDURE — 1159F MED LIST DOCD IN RCRD: CPT | Performed by: INTERNAL MEDICINE

## 2025-02-12 PROCEDURE — 3074F SYST BP LT 130 MM HG: CPT | Performed by: INTERNAL MEDICINE

## 2025-02-12 PROCEDURE — RXMED WILLOW AMBULATORY MEDICATION CHARGE

## 2025-02-12 PROCEDURE — 1160F RVW MEDS BY RX/DR IN RCRD: CPT | Performed by: INTERNAL MEDICINE

## 2025-02-12 PROCEDURE — 1123F ACP DISCUSS/DSCN MKR DOCD: CPT | Performed by: INTERNAL MEDICINE

## 2025-02-12 PROCEDURE — 3078F DIAST BP <80 MM HG: CPT | Performed by: INTERNAL MEDICINE

## 2025-02-12 PROCEDURE — 99214 OFFICE O/P EST MOD 30 MIN: CPT | Performed by: INTERNAL MEDICINE

## 2025-02-12 PROCEDURE — 1157F ADVNC CARE PLAN IN RCRD: CPT | Performed by: INTERNAL MEDICINE

## 2025-02-12 RX ORDER — TAMSULOSIN HYDROCHLORIDE 0.4 MG/1
0.4 CAPSULE ORAL DAILY
Qty: 90 CAPSULE | Refills: 3 | Status: SHIPPED | OUTPATIENT
Start: 2025-02-12 | End: 2025-02-12

## 2025-02-12 RX ORDER — TAMSULOSIN HYDROCHLORIDE 0.4 MG/1
0.4 CAPSULE ORAL DAILY
Qty: 90 CAPSULE | Refills: 3 | Status: SHIPPED | OUTPATIENT
Start: 2025-02-12

## 2025-02-12 RX ORDER — GUAIFENESIN/DEXTROMETHORPHAN 100-10MG/5
5 SYRUP ORAL 3 TIMES DAILY PRN
Qty: 118 ML | Refills: 0 | Status: SHIPPED | OUTPATIENT
Start: 2025-02-12 | End: 2025-02-22

## 2025-02-12 RX ORDER — CALCITRIOL 0.25 UG/1
0.25 CAPSULE ORAL DAILY
Qty: 90 CAPSULE | Refills: 3 | Status: SHIPPED | OUTPATIENT
Start: 2025-02-12 | End: 2025-02-12

## 2025-02-12 RX ORDER — ENSIFENTRINE 3 MG/2.5ML
2.5 SUSPENSION RESPIRATORY (INHALATION) 2 TIMES DAILY
COMMUNITY
Start: 2024-08-30

## 2025-02-12 RX ORDER — FOLIC ACID 1 MG/1
1 TABLET ORAL DAILY
Qty: 90 TABLET | Refills: 3 | Status: SHIPPED | OUTPATIENT
Start: 2025-02-12 | End: 2025-02-12

## 2025-02-12 RX ORDER — FINASTERIDE 5 MG/1
5 TABLET, FILM COATED ORAL DAILY
Qty: 90 TABLET | Refills: 3 | Status: SHIPPED | OUTPATIENT
Start: 2025-02-12 | End: 2026-02-07

## 2025-02-12 RX ORDER — FOLIC ACID 1 MG/1
1 TABLET ORAL DAILY
Qty: 90 TABLET | Refills: 3 | Status: SHIPPED | OUTPATIENT
Start: 2025-02-12

## 2025-02-12 RX ORDER — CALCITRIOL 0.25 UG/1
0.25 CAPSULE ORAL DAILY
Qty: 90 CAPSULE | Refills: 3 | Status: SHIPPED | OUTPATIENT
Start: 2025-02-12

## 2025-02-12 RX ORDER — FINASTERIDE 5 MG/1
5 TABLET, FILM COATED ORAL DAILY
Qty: 90 TABLET | Refills: 3 | Status: SHIPPED | OUTPATIENT
Start: 2025-02-12 | End: 2025-02-12

## 2025-02-12 NOTE — PROGRESS NOTES
Subjective   Patient ID: Charly Cornejo is a 74 y.o. male who presents for Hospital Follow-up.    HPI   Patient is a 74-year-old male with past medical history of hypertension dyslipidemia GERD CKD stage IV BPH solitary kidney COPD with respiratory failure nicotine dependence and multiple histories of pancreatitis who presents for follow-up to recent ER visit.    Patient has respiratory failure due to COPD on 4 L nasal cannula.  He previously followed with pulmonary medicine at [facility] but has not seen in quite a while.  He is now on Breztri twice daily.  Overall he feels well on the current medications.  He had a recent exacerbation of his COPD in the setting of influenza.  He  presented to the emergency 3 times in the last month.  He was given steroids while in the hospital and subsequently discharged without prednisone.  He quit smoking 30 days ago which is an incredible success.  He has a follow-up with pulmonary medicine.  Pulmonary function test shows a significant reduction in FEV1      Hypertension currently well-controlled on current medications.  Denies headache changes in vision orthopnea.  Takes his medications as prescribed.     CKD 4 and following with nephrology.  Currently on ARB as well as SGLT2.  He does have a solitary kidney due to prior history of renal cell carcinoma.  He is in surveillance with urology    He does have a history of pancytopenia.  Currently he has a mildly depressed hemoglobin.  Presumably due to his CKD 4 as well as a low platelet count count.  No active bleeding      Patient uses a walker to get around.  He lives at home and has a girlfriend.  All kids are out of the house              Review of Systems  Constitutional: No fever or chills  Cardiovascular: no chest pain, no palpitations and no syncope.   Respiratory: no cough, no shortness of breath during exertion and no shortness of breath at rest.   Gastrointestinal: no abdominal pain, no nausea and no vomiting.  Neuro:  No Headache, no dizziness    Objective   /69   Pulse 83   Wt 51.3 kg (113 lb)   SpO2 93%   BMI 17.70 kg/m²     Physical Exam  Constitutional: Alert and in no acute distress. Well developed, well nourished  Head and Face: Head and face: Normal.    Cardiovascular: Heart rate and rhythm were normal, normal S1 and S2. No peripheral edema.   Pulmonary: Oxygen dependent, no respiratory distress. Clear bilateral breath sounds.  Musculoskeletal: Gait and station: Normal. Muscle strength/tone: Normal.   Skin: Normal skin color and pigmentation, normal skin turgor, and no rash.    Psychiatric: Judgment and insight: Intact. Mood and affect: Normal.    Procedures    Lab Results   Component Value Date    WBC 3.8 (L) 01/15/2024    HGB 12.4 (L) 01/15/2024    HCT 38.6 (L) 01/15/2024     01/15/2024    CHOL 148 09/20/2024    TRIG 42 09/20/2024    HDL 79.2 09/20/2024    LDLDIRECT 111 01/14/2021    ALT 49 01/15/2024    AST 41 (H) 01/15/2024     10/11/2024    K 4.9 10/11/2024     10/11/2024    CREATININE 2.27 (H) 10/11/2024    BUN 30 (H) 10/11/2024    CO2 28 10/11/2024    TSH 2.47 01/04/2023    PSA 0.47 02/24/2021    INR 1.1 10/13/2022    HGBA1C 5.6 01/18/2025       ECG 12 lead  Sinus bradycardia with Premature ventricular complexes  Minimal voltage criteria for LVH, may be normal variant ( Sokolow-Miguel )  T wave abnormality, consider lateral ischemia  Abnormal ECG  When compared with ECG of 10-OCT-2024 11:42,  Premature ventricular complexes now present    Confirmed by Thomas Kelly ([id number]) on 1/8/2025 12:24:17 PM            Assessment/Plan   Problem List Items Addressed This Visit             ICD-10-CM    BPH (benign prostatic hyperplasia) N40.0    Relevant Medications    finasteride (Proscar) 5 mg tablet    tamsulosin (Flomax) 0.4 mg 24 hr capsule    Other cirrhosis of liver K74.69    Chronic pancreatitis (Multi) K86.1    Chronic kidney disease, stage 3b (Multi) N18.32    Diabetic neuropathy (Multi) E11.40     Relevant Orders    Referral to Podiatry    Hyperparathyroidism (Multi) E21.3    Relevant Medications    calcitriol (Rocaltrol) 0.25 mcg capsule    Renal cell carcinoma (Multi) C64.9    History of alcohol abuse F10.11    Relevant Medications    folic acid (Folvite) 1 mg tablet    COPD (chronic obstructive pulmonary disease) (Multi) - Primary J44.9    Relevant Medications    dextromethorphan-guaifenesin (Robitussin DM)  mg/5 mL oral liquid    Other Relevant Orders    Referral to Pulmonology    Follow Up In [facility] - PCP - Established     Overall seems he is doing well.  His breathing is stable.  He completed treatment for his influenza.  Now on 4 L nasal cannula.  No wheezing on examination.  Needs referral to pulmonary medicine.  Continue with Breztri as prescribed.  Congratulations on cessation of smoking.    Blood pressure well-controlled.  No changes in medications follow-up 3 months        Tobacco Counseling  The patient smokes cigarettes and desires to quit.  We created the following quit plan:  Quit assistance referrals: none.

## 2025-02-13 ENCOUNTER — PATIENT OUTREACH (OUTPATIENT)
Dept: PRIMARY CARE | Facility: CLINIC | Age: 75
End: 2025-02-13
Payer: MEDICARE

## 2025-02-13 PROCEDURE — RXMED WILLOW AMBULATORY MEDICATION CHARGE

## 2025-02-13 NOTE — PROGRESS NOTES
Call regarding appt. with PCP on 2/12/25 after hospitalization.  At time of outreach call the patient feels as if their condition has (improved since last visit.  Reviewed the PCP appointment with the pt and addressed any questions or concerns.

## 2025-02-14 ENCOUNTER — PHARMACY VISIT (OUTPATIENT)
Dept: PHARMACY | Facility: CLINIC | Age: 75
End: 2025-02-14
Payer: COMMERCIAL

## 2025-02-27 ENCOUNTER — PATIENT OUTREACH (OUTPATIENT)
Dept: PRIMARY CARE | Facility: CLINIC | Age: 75
End: 2025-02-27
Payer: MEDICARE

## 2025-03-06 ENCOUNTER — OFFICE VISIT (OUTPATIENT)
Dept: PULMONOLOGY | Facility: HOSPITAL | Age: 75
End: 2025-03-06
Payer: MEDICARE

## 2025-03-06 VITALS
HEART RATE: 86 BPM | BODY MASS INDEX: 18.64 KG/M2 | DIASTOLIC BLOOD PRESSURE: 77 MMHG | WEIGHT: 119 LBS | OXYGEN SATURATION: 95 % | TEMPERATURE: 98 F | SYSTOLIC BLOOD PRESSURE: 126 MMHG

## 2025-03-06 DIAGNOSIS — J44.9 CHRONIC OBSTRUCTIVE PULMONARY DISEASE, UNSPECIFIED COPD TYPE (MULTI): ICD-10-CM

## 2025-03-06 PROCEDURE — 1159F MED LIST DOCD IN RCRD: CPT | Performed by: STUDENT IN AN ORGANIZED HEALTH CARE EDUCATION/TRAINING PROGRAM

## 2025-03-06 PROCEDURE — 99215 OFFICE O/P EST HI 40 MIN: CPT | Performed by: STUDENT IN AN ORGANIZED HEALTH CARE EDUCATION/TRAINING PROGRAM

## 2025-03-06 PROCEDURE — 3078F DIAST BP <80 MM HG: CPT | Performed by: STUDENT IN AN ORGANIZED HEALTH CARE EDUCATION/TRAINING PROGRAM

## 2025-03-06 PROCEDURE — 3074F SYST BP LT 130 MM HG: CPT | Performed by: STUDENT IN AN ORGANIZED HEALTH CARE EDUCATION/TRAINING PROGRAM

## 2025-03-06 PROCEDURE — 1126F AMNT PAIN NOTED NONE PRSNT: CPT | Performed by: STUDENT IN AN ORGANIZED HEALTH CARE EDUCATION/TRAINING PROGRAM

## 2025-03-06 PROCEDURE — 99205 OFFICE O/P NEW HI 60 MIN: CPT | Performed by: STUDENT IN AN ORGANIZED HEALTH CARE EDUCATION/TRAINING PROGRAM

## 2025-03-06 PROCEDURE — 4010F ACE/ARB THERAPY RXD/TAKEN: CPT | Performed by: STUDENT IN AN ORGANIZED HEALTH CARE EDUCATION/TRAINING PROGRAM

## 2025-03-06 PROCEDURE — 1123F ACP DISCUSS/DSCN MKR DOCD: CPT | Performed by: STUDENT IN AN ORGANIZED HEALTH CARE EDUCATION/TRAINING PROGRAM

## 2025-03-06 PROCEDURE — 1157F ADVNC CARE PLAN IN RCRD: CPT | Performed by: STUDENT IN AN ORGANIZED HEALTH CARE EDUCATION/TRAINING PROGRAM

## 2025-03-06 RX ORDER — ROFLUMILAST 250 UG/1
250 TABLET ORAL DAILY
Qty: 30 TABLET | Refills: 0 | Status: SHIPPED | OUTPATIENT
Start: 2025-03-06 | End: 2025-03-07

## 2025-03-06 RX ORDER — ROFLUMILAST 500 UG/1
500 TABLET ORAL DAILY
Qty: 30 TABLET | Refills: 11 | Status: SHIPPED | OUTPATIENT
Start: 2025-04-06 | End: 2025-03-07

## 2025-03-06 ASSESSMENT — PAIN SCALES - GENERAL: PAINLEVEL_OUTOF10: 0-NO PAIN

## 2025-03-06 NOTE — Clinical Note
PUNEET- starting roflumilast- Dr Graham - I placed order for ECG for Qtc monitoring - pt was wondering if this could be coordinated with cardiology appointment Dr Jimenez - monitoring his weight closely given GI SE's - did discuss this SE with MR. Cornejo

## 2025-03-06 NOTE — PATIENT INSTRUCTIONS
It was a pleasure to see you in clinic today  Please start Roflumilast  Please schedule pulmonary rehab  A modified barium swallow order was placed to evaluate for silent aspiration  Acapella after albuterol for airway clearance  Overnight pulse ox sent for evalof oxygen needs when sleeping  Follow-up with repeat breathing tests in 6 weeks

## 2025-03-06 NOTE — PROGRESS NOTES
Department of Medicine I Division of Pulmonary, Critical Care, and Sleep Medicine   [id number] [place] [address] [address]th [place] [address]  Phone: [phone]  Fax: [phone]    History of Present Illness   Charly Cornejo is a 74 y.o. male presenting with CDOPD. He also has a history of  htn, COPD on Breztri (PFTs, moderate obstruction per report in 2022), BPH, bilateral lower lobe wedge resections on hte right and IMTIAZ, LLL wedge resectoins and LLL bullectomy (2021) by Dr. Escalera initially from PTX after fall, nephrectomy (renal mass).    Patient notes recent admission for COPD exacerbation and was discharged home and then readmitted the same day.  He has not been intubated.  He has been on Questran for the last 4 to 5 years and has been on the stable 4 L of oxygen for about the same time.  He notes that they wanted to turn his oxygen down while he was in the hospital but he had hesitancy given the stability and longevity he has been on oxygen.    Cough - does have a cough that comes and goes; he notes that he feels like stuff coming up and out and feels there is a rattling; robitussin did help in the past;   Feltbreathing was getting better before getting the flu  Got the flu shot/covid    Has not needed daily steroids  Albuterol 2-3x/day none at night; after eat and in the morning when waking up; over exertion  Quit smoking 1 month ago - or over     CAT Score 22:  3/4/2/3/3/2/2/3 - doesn't get short of breath as long as he takes his time; can't make bed  Pulmonary rehab - has not done    Social:  0.75years since age 17  Worked in [employer]  - 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 The vocal cords appear        normal. The subglottic space is  normal. The trachea is of normal        caliber. The eloy is sharp. The tracheobronchial tree was examined to        at least the first subsegmental level. Bronchial mucosa and anatomy are        normal; there are no endobronchial lesions.       Bilateral Lung Abnormalities: Copious, mucoid, thick secretions were        found throughout the tracheobronchial tree (left > right). They were        partially obstructing the airway on the right, and completely        obstructing on the left.       There were no lesions, no bleeding, no       Therapeutic suctioning was performed in the entire tracheobronchial        tree. Mucus and mucus plugs were removed from the airway and the airway        was cleared. The suctioned material was sent for bacterial, AFB and        fungal analysis.        Referred by:  Jermaine Jimenez DO, for COPD. I have independently interviewed and examined the patient in the office and reviewed available records.     Review of Systems  Review of Systems  All other review of systems are negative and/or non-contributory.    Past Medical History   He has a past medical history of Acute bronchitis (06/13/2023), Acute urinary retention (06/13/2023), Arthritis, Asteroid hyalosis, Cataract, COPD (chronic obstructive pulmonary disease) (Multi), Diabetic retinopathy (Multi), Glaucoma suspect of both eyes, Hyperlipidemia, Hypertension, Neuropathy, Other disorders of lung, Personal history of other diseases of the circulatory system (03/14/2017), Personal history of other diseases of the circulatory system, Personal history of other diseases of the respiratory system, and Personal history of other specified conditions.    Immunizations     Immunization History   Administered Date(s) Administered    Flu vaccine (IIV4), preservative free *Check age/dose* 12/14/2022    Flu vaccine, quadrivalent, high-dose, preservative free, age 65y+ (FLUZONE) 11/07/2023    Flu vaccine, quadrivalent, recombinant, preservative  free, adult (FLUBLOK) 12/27/2021    Flu vaccine, trivalent, preservative free, HIGH-DOSE, age 65y+ (Fluzone) 09/17/2024    Moderna COVID-19 vaccine, bivalent, blue cap/gray label *Check age/dose* 09/24/2022    Pneumococcal conjugate vaccine, 20-valent (PREVNAR 20) 11/07/2023    Zoster, live 03/09/2016       Medications and Allergies     Current Outpatient Medications   Medication Instructions    acetaminophen (TYLENOL) 650 mg, oral, Every 6 hours PRN    albuterol 90 mcg/actuation inhaler 2 puffs, inhalation, Every 4 hours PRN    allopurinol (ZYLOPRIM) 100 mg, oral, Daily RT    amLODIPine (NORVASC) 10 mg, oral, Daily RT    budesonide-glycopyr-formoterol (Breztri Aerosphere) 160-9-4.8 mcg/actuation HFA aerosol inhaler 2 puffs, inhalation, 2 times daily    calcitriol (ROCALTROL) 0.25 mcg, oral, Daily    docusate sodium (Colace) 100 mg capsule 1 capsule, oral, 2 times daily PRN    ensifentrine (Ohtuvayre) 3 mg/2.5 mL suspension for nebulization 2.5 mL, 2 times daily    Farxiga 10 mg, oral, Daily    ferrous sulfate 325 (65 Fe) MG tablet 1 tablet, oral    finasteride (PROSCAR) 5 mg, oral, Daily    folic acid (FOLVITE) 1 mg, oral, Daily    gabapentin (NEURONTIN) 200 mg, oral, 3 times daily    ipratropium-albuteroL (Duo-Neb) 0.5-2.5 mg/3 mL nebulizer solution INHALE THE CONTENTS OF 1   VIAL VIA NEBULIZER 4 TIMES A DAY    losartan (COZAAR) 50 mg, oral, Daily    metoprolol succinate XL (TOPROL-XL) 25 mg, oral, Daily    omeprazole (PriLOSEC) 20 mg DR capsule 1 capsule, oral, Daily    oxygen (O2) therapy 4 Liter O2 - CONTINUOUS (route: Oxygen)    polyethylene glycol (GLYCOLAX, MIRALAX) 17 g, oral, Daily PRN    rosuvastatin (CRESTOR) 20 mg, oral, Nightly    tamsulosin (FLOMAX) 0.4 mg, oral, Daily      Aspirin and Azithromycin    Social History   He reports that he has been smoking cigarettes. He has never used smokeless tobacco. He reports that he does not currently use alcohol. He reports that he does not use drugs.    Family  "History     Family History   Problem Relation Name Age of Onset    Diabetes Mother      Leukemia Mother      Diabetes Father      Diabetes Brother           Surgical History   He has a past surgical history that includes Other surgical history (10/13/2021); Other surgical history (10/22/2021); Hernia repair (10/16/2017); FL guided abdominal paracentesis (07/06/2020); Cataract extraction; Colonoscopy; Upper gastrointestinal endoscopy; Other surgical history; Other surgical history; and Other surgical history.    Physical Exam   /77   Pulse 86   Temp 36.7 °C (98 °F) (Temporal)   Wt 54 kg (119 lb)   SpO2 95%   BMI 18.64 kg/m²      Physical Exam  Gen: pleasant, conversational, no distress  HENT: MMM, eyes non-icteric, NC in place  Lungs: wet cough, clears throat often, diminished at bases without wheezing  Abdomen: No epigastric tenderness, +BS  Extremities: trace LE edema  Psych: appropriate mood for situation  Results   Pulmonary Function Tests:  Failed to redirect to the Timeline version of the [employer]ical SmartLink.    No results found for: \"FEV1\", \"DNQ7ODIC\"    Chest Radiograph:  XR chest 1 view 01/22/2025    Narrative  * * *Final Report* * *    DATE OF EXAM: Jan 22 2025  5:51PM    EUX   [id number]  -  XR CHEST 1V FRONTAL PORT  / ACCESSION #  [id number]    PROCEDURE REASON: Shortness of breath    * * * * Physician Interpretation * * * *    RESULT: EXAMINATION:  CHEST RADIOGRAPH (PORTABLE SINGLE VIEW AP)    Exam Date/Time:  1/22/2025 5:51 PM  CLINICAL HISTORY: Shortness of breath  MQ:  XCPR_5  Comparison:  1/17/2025    RESULT:    Lines, tubes, and devices:  None.    Lungs and pleura:  There is pulmonary emphysema and staple material in  both lungs from prior lung surgery. No definite new pulmonary  consolidation. Vague small new hazy opacity right lower lobe is probably  just related to the overlap of soft tissue, pulmonary vessels and ribs  however a small developing groundglass infiltrate in this area is " "not  entirely excluded. There is no pleural effusion. No pneumothorax.    Cardiomediastinal silhouette:  Stable cardiomediastinal silhouette.    Other:  .    Impression  IMPRESSION:    See result                  Transcribed Using Voice Recognition  Transcribe Date/Time: Jan 22 2025  6:11P    Dictated by: DIVYA SNELL MD    This examination was interpreted and the report reviewed and  electronically signed by:  DIVYA SNELL MD on Jan 22 2025  6:16PM  EST      Chest CT Scan:  No results found for this or any previous visit from the past 365 days.       ECHO:  No results found for this or any previous visit from the past 365 days.       Labs:  Lab Results   Component Value Date    WBC 3.8 (L) 01/15/2024    HGB 12.4 (L) 01/15/2024    HCT 38.6 (L) 01/15/2024     (H) 01/15/2024     01/15/2024       Lab Results   Component Value Date    CREATININE 2.27 (H) 10/11/2024    BUN 30 (H) 10/11/2024     10/11/2024    K 4.9 10/11/2024     10/11/2024    CO2 28 10/11/2024        No results found for: \"GARDENIA\", \"RF\", \"SEDRATE\"     IgE: No results found for: \"IGE\"   Respiratory Allergy Panel:  AEC:   Eosinophils Absolute (x10*3/uL)   Date Value   01/15/2024 0.09     Eosinophils % (%)   Date Value   01/15/2024 2.4       Cultures:  No results found for: \"AFBCX\"   No results found for: \"RESPCULTCYFI\"    No results found for the last 90 days.  C     Assessment and Plan   Diagnoses and all orders for this visit:  Chronic obstructive pulmonary disease, unspecified COPD type (Multi)  -     Referral to Pulmonology  -     Referral to Pulmonary Rehabilitation; Future  -     ECG 12 lead; Future  -     Complete Pulmonary Function Test (Spirometry/DLCO/Lung Volumes); Future  -     Oximetry Desat/O2 Titration (Exercise Desat); Future  -     Pulmonary Stress Test (6 Min. Walk); Future  -     Follow Up In Pulmonology; Future  -     FL modified barium swallow study; Future  -     Overnight Pulse Oximetry Test; Future  -    "  roflumilast (Daliresp) 250 mcg tablet; Take 1 tablet (250 mcg) by mouth once daily.  -     roflumilast (Daliresp) 500 mcg tablet; Take 1 tablet (500 mcg) by mouth once daily. Do not fill before April 7, 2025.    Patient presenting with COPD class 4 E (FEV1=29% in 2024) mMRC 4,  on Breztri for the last several years and chronic respiratory failure on 4L of oxygen. .  He was seen by [facility] pulmonary in the past and was prescribed ensifentrine but this was cost prohibitive. He has not participated in pulmonary rehab. Continue Breztri, start roflumilast - will obtian ECG with cardiologist. Overnight pulse ox to ensure adequate oxygen overnight - pt declines PAP therapy for any outptaient purpose.    Patient noted to have wet cough with concern for aspiration so will obtain MBS.     lung Cancer Screening - last CT scan I can see 9/2024 with F CT scan 1/17/2025 - next due 2/17/26     I spent 60 minutes in the professional and overall care of this patient.      Follow-up:  Visit date not found     Abi Villarreal MD  03/06/2025

## 2025-03-07 RX ORDER — ROFLUMILAST 250 UG/1
250 TABLET ORAL DAILY
Qty: 30 TABLET | Refills: 0 | Status: SHIPPED | OUTPATIENT
Start: 2025-03-07 | End: 2025-04-06

## 2025-03-07 RX ORDER — ROFLUMILAST 500 UG/1
500 TABLET ORAL DAILY
Qty: 30 TABLET | Refills: 11 | Status: SHIPPED | OUTPATIENT
Start: 2025-04-07 | End: 2026-04-07

## 2025-03-17 ENCOUNTER — APPOINTMENT (OUTPATIENT)
Dept: PRIMARY CARE | Facility: CLINIC | Age: 75
End: 2025-03-17
Payer: COMMERCIAL

## 2025-03-18 ENCOUNTER — TELEPHONE (OUTPATIENT)
Dept: CARDIAC REHAB | Facility: HOSPITAL | Age: 75
End: 2025-03-18
Payer: MEDICARE

## 2025-03-18 NOTE — PROGRESS NOTES
PATIENT: Charly Cornejo  DATE: 3/18/2025    Called patient regarding scheduling Pulmonary Rehab at [facility]. Spoke to patient and he will call the rehab back on [phone] after scheduling transportation rides.    Mita Muhammad, [facility]

## 2025-03-24 DIAGNOSIS — G62.9 NEUROPATHY: ICD-10-CM

## 2025-03-24 PROCEDURE — RXMED WILLOW AMBULATORY MEDICATION CHARGE

## 2025-03-24 RX ORDER — GABAPENTIN 100 MG/1
200 CAPSULE ORAL 3 TIMES DAILY
Qty: 540 CAPSULE | Refills: 0 | Status: SHIPPED | OUTPATIENT
Start: 2025-03-24

## 2025-03-25 NOTE — PROGRESS NOTES
PATIENT: Charly Cornejo  DATE: 3/25/2025    Returned patient's call  regarding scheduling Pulmonary Rehab at [facility]. Spoke to patient and informed him the time and days reserved for the assessment as well as rehab sessions. Patient informed the rehab staff that he will reach out by the end of this week after getting his rides schedule.    Mita Muhammad, [facility]

## 2025-03-25 NOTE — PROGRESS NOTES
PATIENT: Charly Cornejo  DATE: 3/25/2025    Returned patient's phone  regarding scheduling Pulmonary Rehab at [facility].   Spoke to patient and schedule program assessment appointment on 04/22/2025 at 3:00 PM.    SARKIS Izquierdo-EP

## 2025-03-26 ENCOUNTER — PHARMACY VISIT (OUTPATIENT)
Dept: PHARMACY | Facility: CLINIC | Age: 75
End: 2025-03-26
Payer: MEDICARE

## 2025-03-28 ENCOUNTER — ANCILLARY PROCEDURE (OUTPATIENT)
Dept: CARDIOLOGY | Facility: CLINIC | Age: 75
End: 2025-03-28
Payer: MEDICARE

## 2025-03-28 DIAGNOSIS — J44.9 CHRONIC OBSTRUCTIVE PULMONARY DISEASE, UNSPECIFIED COPD TYPE (MULTI): ICD-10-CM

## 2025-03-28 PROCEDURE — 93010 ELECTROCARDIOGRAM REPORT: CPT | Performed by: INTERNAL MEDICINE

## 2025-03-28 PROCEDURE — 93005 ELECTROCARDIOGRAM TRACING: CPT

## 2025-03-31 ENCOUNTER — APPOINTMENT (OUTPATIENT)
Dept: NEPHROLOGY | Facility: CLINIC | Age: 75
End: 2025-03-31
Payer: COMMERCIAL

## 2025-03-31 VITALS
HEART RATE: 65 BPM | WEIGHT: 122 LBS | DIASTOLIC BLOOD PRESSURE: 65 MMHG | TEMPERATURE: 97.9 F | OXYGEN SATURATION: 91 % | BODY MASS INDEX: 19.11 KG/M2 | SYSTOLIC BLOOD PRESSURE: 113 MMHG

## 2025-03-31 DIAGNOSIS — N18.4 STAGE 4 CHRONIC KIDNEY DISEASE (MULTI): Primary | ICD-10-CM

## 2025-03-31 PROCEDURE — 1123F ACP DISCUSS/DSCN MKR DOCD: CPT | Performed by: INTERNAL MEDICINE

## 2025-03-31 PROCEDURE — 99213 OFFICE O/P EST LOW 20 MIN: CPT | Performed by: INTERNAL MEDICINE

## 2025-03-31 PROCEDURE — 4010F ACE/ARB THERAPY RXD/TAKEN: CPT | Performed by: INTERNAL MEDICINE

## 2025-03-31 PROCEDURE — 3074F SYST BP LT 130 MM HG: CPT | Performed by: INTERNAL MEDICINE

## 2025-03-31 PROCEDURE — 1157F ADVNC CARE PLAN IN RCRD: CPT | Performed by: INTERNAL MEDICINE

## 2025-03-31 PROCEDURE — 1159F MED LIST DOCD IN RCRD: CPT | Performed by: INTERNAL MEDICINE

## 2025-03-31 PROCEDURE — 3078F DIAST BP <80 MM HG: CPT | Performed by: INTERNAL MEDICINE

## 2025-03-31 PROCEDURE — 1126F AMNT PAIN NOTED NONE PRSNT: CPT | Performed by: INTERNAL MEDICINE

## 2025-03-31 ASSESSMENT — PAIN SCALES - GENERAL: PAINLEVEL_OUTOF10: 0-NO PAIN

## 2025-03-31 NOTE — PROGRESS NOTES
"For follow up, doing well.  No complaints  admitted at [facility] from 1/15-1/22 for flu A + COPD exacerbation  Not checking BP at home  Has very rare orthostatic symptoms-once a month    RoS negative for all other systems except as noted above.        6/17/2024    11:28 AM 9/17/2024    12:39 PM 10/10/2024    11:38 AM 10/14/2024    11:33 AM 1/7/2025     1:02 PM 2/12/2025    10:53 AM 3/6/2025     1:07 PM   Vitals   Systolic 125 128 135 121 130 104 126   Diastolic 76 76 84 73 70 69 77   BP Location Right arm  Right arm Right arm      Heart Rate 71 70 59 70 65 83 86   Temp 36.9 °C (98.4 °F)   36.6 °C (97.9 °F) 36.8 °C (98.2 °F)  36.7 °C (98 °F)   Resp    18 16     Height 1.702 m (5' 7\") 1.702 m (5' 7\") 1.702 m (5' 7\")  1.702 m (5' 7\")     Weight (lb) 111.6 120 116 116.8 118 113 119   BMI 17.48 kg/m2 18.79 kg/m2 18.17 kg/m2 18.29 kg/m2 18.48 kg/m2 17.7 kg/m2 18.64 kg/m2   BSA (m2) 1.55 m2 1.6 m2 1.58 m2 1.58 m2 1.59 m2 1.56 m2 1.6 m2   Visit Report Report Report Report Report  Report Report     No distress  HEENT:  on 4 L O2 by nasal cannula  trace edema  L ankle, none R  Breath sounds khurram equal, clear  S1 S2 regular, normal, no rub or murmur  Abdomen soft  AAO x3, non focal    Lab Results   Component Value Date     10/11/2024     01/15/2024     01/12/2024    K 4.9 10/11/2024    K 4.1 01/15/2024    K 4.1 01/12/2024     10/11/2024     01/15/2024     01/12/2024    CO2 28 10/11/2024    CO2 25 01/15/2024    CO2 27 01/12/2024    BUN 30 (H) 10/11/2024    BUN 33 (H) 01/15/2024    BUN 36 (H) 01/12/2024    CREATININE 2.27 (H) 10/11/2024    CREATININE 2.62 (H) 01/15/2024    CREATININE 2.89 (H) 01/12/2024    CALCIUM 10.3 10/11/2024    CALCIUM 8.6 01/15/2024    CALCIUM 10.0 01/12/2024    PHOS 3.8 10/11/2024    PHOS 2.2 (L) 01/12/2024    PHOS 3.9 08/21/2023    VITD25 34 10/11/2024    VITD25 35 01/12/2024    VITD25 38 01/04/2023    MICROALBCREA 163.3 (H) 10/14/2024    MICROALBCREA 95.8 (H) " 01/12/2024    MICROALBCREA 127.0 (H) 08/21/2023    HGB 12.4 (L) 01/15/2024    HGB 11.9 (L) 07/06/2023    HGB 12.1 (L) 07/05/2023      Current Outpatient Medications   Medication Instructions    albuterol 90 mcg/actuation inhaler 2 puffs, Every 4 hours PRN    allopurinol (ZYLOPRIM) 100 mg, Daily RT    amLODIPine (NORVASC) 10 mg, Daily RT    budesonide-glycopyr-formoterol (Breztri Aerosphere) 160-9-4.8 mcg/actuation HFA aerosol inhaler 2 puffs, inhalation, 2 times daily    calcitriol (ROCALTROL) 0.25 mcg, oral, Daily    Farxiga 10 mg, oral, Daily    ferrous sulfate 325 (65 Fe) MG tablet 1 tablet    finasteride (PROSCAR) 5 mg, oral, Daily    folic acid (FOLVITE) 1 mg, oral, Daily    gabapentin (NEURONTIN) 200 mg, oral, 3 times daily    ipratropium-albuteroL (Duo-Neb) 0.5-2.5 mg/3 mL nebulizer solution INHALE THE CONTENTS OF 1   VIAL VIA NEBULIZER 4 TIMES A DAY    losartan (COZAAR) 50 mg, oral, Daily    metoprolol succinate XL (TOPROL-XL) 25 mg, oral, Daily    omeprazole (PriLOSEC) 20 mg DR capsule 1 capsule, Daily    oxygen (O2) therapy 4 Liter O2 - CONTINUOUS (route: Oxygen)    polyethylene glycol (GLYCOLAX, MIRALAX) 17 g, Daily PRN    roflumilast (DALIRESP) 250 mcg, oral, Daily    [START ON 4/7/2025] roflumilast (DALIRESP) 500 mcg, oral, Daily    rosuvastatin (CRESTOR) 20 mg, oral, Nightly    tamsulosin (FLOMAX) 0.4 mg, oral, Daily     74-year-old with history of solitary kidney, hypertension, nephrolithiasis, prior episodes of acute kidney injury now with chronic kidney disease      #Chronic kidney disease stage G3 B A2: Creatinine on 1/31/25 at [facility]  was 2.08 mg per DL, EGFR 33 mill per minute, stable. Tolerating Farxiga well, continue for now.      #Bone mineral metabolism: Calcium 9.1 ct calcitriol 0.25 mcg daily     #Hypertension: Goal blood pressure less than 120/80, at goal. Continue amlodipine 10 mg/day, losartan 50 mg once a day and metoprolol succinate XL 25 mg once a day. If pedal edema worsens,  can decrease dose of amlodipine.  RTC: 4 mo

## 2025-04-01 ENCOUNTER — ANCILLARY PROCEDURE (OUTPATIENT)
Dept: CARDIOLOGY | Facility: CLINIC | Age: 75
End: 2025-04-01
Payer: MEDICARE

## 2025-04-01 DIAGNOSIS — J96.11 CHRONIC RESPIRATORY FAILURE WITH HYPOXIA: ICD-10-CM

## 2025-04-01 DIAGNOSIS — I31.39 PERICARDIAL EFFUSION (HHS-HCC): ICD-10-CM

## 2025-04-01 DIAGNOSIS — R06.02 SHORTNESS OF BREATH: ICD-10-CM

## 2025-04-01 DIAGNOSIS — I25.10 CORONARY ARTERY CALCIFICATION: ICD-10-CM

## 2025-04-01 LAB
AORTIC VALVE PEAK VELOCITY: 1.09 M/S
ATRIAL RATE: 79 BPM
AV PEAK GRADIENT: 5 MMHG
AVA (PEAK VEL): 3.45 CM2
EJECTION FRACTION APICAL 4 CHAMBER: 59
EJECTION FRACTION: 62 %
LEFT VENTRICLE INTERNAL DIMENSION DIASTOLE: 5.16 CM (ref 3.5–6)
LEFT VENTRICULAR OUTFLOW TRACT DIAMETER: 2.05 CM
MITRAL VALVE E/A RATIO: 0.94
P AXIS: 80 DEGREES
P OFFSET: 214 MS
P ONSET: 157 MS
PR INTERVAL: 136 MS
Q ONSET: 225 MS
QRS COUNT: 13 BEATS
QRS DURATION: 82 MS
QT INTERVAL: 416 MS
QTC CALCULATION(BAZETT): 477 MS
QTC FREDERICIA: 456 MS
R AXIS: 84 DEGREES
T AXIS: 82 DEGREES
T OFFSET: 433 MS
TRICUSPID ANNULAR PLANE SYSTOLIC EXCURSION: 3.7 CM
VENTRICULAR RATE: 79 BPM

## 2025-04-01 PROCEDURE — 93306 TTE W/DOPPLER COMPLETE: CPT

## 2025-04-01 PROCEDURE — 93306 TTE W/DOPPLER COMPLETE: CPT | Performed by: INTERNAL MEDICINE

## 2025-04-03 PROCEDURE — RXMED WILLOW AMBULATORY MEDICATION CHARGE

## 2025-04-04 ENCOUNTER — PHARMACY VISIT (OUTPATIENT)
Dept: PHARMACY | Facility: CLINIC | Age: 75
End: 2025-04-04
Payer: MEDICARE

## 2025-04-06 ASSESSMENT — EXTERNAL EXAM - LEFT EYE: OS_EXAM: NORMAL

## 2025-04-06 ASSESSMENT — SLIT LAMP EXAM - LIDS
COMMENTS: GOOD POSITION
COMMENTS: GOOD POSITION

## 2025-04-06 ASSESSMENT — CUP TO DISC RATIO
OD_RATIO: 0.35
OS_RATIO: 0.35

## 2025-04-06 ASSESSMENT — EXTERNAL EXAM - RIGHT EYE: OD_EXAM: NORMAL

## 2025-04-06 NOTE — PROGRESS NOTES
Glaucoma suspect, both eyesH40.003  -No FH of glaucoma  -Pachymetry (9/3/24) - 490/494.   -HVF 24-2 (4/8/25) - OD: 2/15FL 3% FN. Inferior possible arcuate depression. OS: 2/14FL 9%FN. Scattered depressed points.   -OCT RNFL (4/8/25) - OD: Thin S, bord N/T. OS: Thin T. 64/83. Similar to 9/3/24.   -Discussed risk of vision loss with diagnosis of glaucoma.   -Concern for possible change on OCT RNFL vs poor image due to asteroid hyalosis. Would recommend evaluation with glaucoma service for evaluation and management.   -F/u with me in 1 year - comprehensive exam and OCT RNFL.     Pseudophakia of right eyeZ96.1 - 11/9/23  Pseudophakia of left eyeZ96.1 - 9/28/23  -Doing well. Good vision. IOP good. Off all gtts.   -May use artificial tears for dryness.  -F/u with me in 1 year - comprehensive exam and OCT RNFL.     Mild nonproliferative diabetic retinopathy of both eyes without macular [id number]  Diabetes cwtorqpfT94.9  -OCT macula (9/3/24) - Normal thickness and contour OU. Intact IS-OS OU. No edema OU. 273/275. Stable from 8/29/23.   -HbA1c= 5.6 (1/18/25). History of mild nonproliferative diabetic retinopathy seen on exam. No significant retinopathy seen on exam today, no macular edema OU. Continue close monitoring of blood glucose, blood pressure, and cholesterol.     Retraction of lower hbjcefP33.539  -No lagophthalmos, not bothersome to patient. Monitor for now and can consider referral to oculoplastic surgeon as needed.     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   -Patient noticing floaters. No retinal tear or detachment seen on exam. Retinal detachment symptoms discussed.     ZsquqdF81.10  JwkxfzqeiaP32.4  -Does not drive.   -New Rx for glasses given per patient request. Patient's signature obtained to acknowledge and confirm that a paper copy of glasses Rx was given to patient in compliance with [facility] Eyeglass Rule. Electronic copy of Rx will also be available via [employer]/EPIC.       No history  of refractive surgery.   No FH of AMD/glaucoma

## 2025-04-08 ENCOUNTER — APPOINTMENT (OUTPATIENT)
Dept: OPHTHALMOLOGY | Facility: CLINIC | Age: 75
End: 2025-04-08
Payer: COMMERCIAL

## 2025-04-08 DIAGNOSIS — H02.539: ICD-10-CM

## 2025-04-08 DIAGNOSIS — H52.4 PRESBYOPIA: ICD-10-CM

## 2025-04-08 DIAGNOSIS — Z96.1 PSEUDOPHAKIA: ICD-10-CM

## 2025-04-08 DIAGNOSIS — E11.3293 MILD NONPROLIFERATIVE DIABETIC RETINOPATHY OF BOTH EYES WITHOUT MACULAR EDEMA ASSOCIATED WITH TYPE 2 DIABETES MELLITUS: ICD-10-CM

## 2025-04-08 DIAGNOSIS — H40.003 GLAUCOMA SUSPECT, BOTH EYES: Primary | ICD-10-CM

## 2025-04-08 DIAGNOSIS — H43.23 ASTEROID HYALOSIS OF BOTH EYES: ICD-10-CM

## 2025-04-08 DIAGNOSIS — H52.13 MYOPIA, BILATERAL: ICD-10-CM

## 2025-04-08 PROCEDURE — 92133 CPTRZD OPH DX IMG PST SGM ON: CPT | Performed by: OPHTHALMOLOGY

## 2025-04-08 PROCEDURE — 92015 DETERMINE REFRACTIVE STATE: CPT | Performed by: OPHTHALMOLOGY

## 2025-04-08 PROCEDURE — 99214 OFFICE O/P EST MOD 30 MIN: CPT | Performed by: OPHTHALMOLOGY

## 2025-04-08 PROCEDURE — 92083 EXTENDED VISUAL FIELD XM: CPT | Performed by: OPHTHALMOLOGY

## 2025-04-08 ASSESSMENT — TONOMETRY
IOP_METHOD: GOLDMANN APPLANATION
OD_IOP_MMHG: 10
OS_IOP_MMHG: 11

## 2025-04-08 ASSESSMENT — REFRACTION_WEARINGRX
OS_SPHERE: GLASSES BROKEN
OD_SPHERE: -1.00
OS_ADD: +2.50
OD_CYLINDER: -0.50
OS_AXIS: 090
OS_CYLINDER: -0.50
OD_ADD: +2.50
OD_AXIS: 050
OS_SPHERE: -0.50
OD_SPHERE: GLASSES BROKEN

## 2025-04-08 ASSESSMENT — CONF VISUAL FIELD
OD_SUPERIOR_TEMPORAL_RESTRICTION: 0
OS_SUPERIOR_TEMPORAL_RESTRICTION: 0
OS_NORMAL: 1
OS_SUPERIOR_NASAL_RESTRICTION: 0
OD_INFERIOR_TEMPORAL_RESTRICTION: 0
OD_SUPERIOR_NASAL_RESTRICTION: 0
OS_INFERIOR_TEMPORAL_RESTRICTION: 0
OD_NORMAL: 1
OS_INFERIOR_NASAL_RESTRICTION: 0
OD_INFERIOR_NASAL_RESTRICTION: 0

## 2025-04-08 ASSESSMENT — VISUAL ACUITY
OD_SC+: +2
METHOD: SNELLEN - LINEAR
OS_SC: 20/50
OS_PH_SC: 20/25
OD_SC: 20/30

## 2025-04-08 ASSESSMENT — REFRACTION_MANIFEST
OD_SPHERE: -1.25
OD_ADD: +2.75
OS_ADD: +2.75
OD_AXIS: 050
OS_CYLINDER: -0.50
OS_AXIS: 085
OD_CYLINDER: -0.50
OS_SPHERE: -0.75

## 2025-04-08 ASSESSMENT — PACHYMETRY
OS_CT(UM): 494
OD_CT(UM): 490

## 2025-04-08 ASSESSMENT — ENCOUNTER SYMPTOMS: EYES NEGATIVE: 1

## 2025-04-14 ENCOUNTER — OFFICE VISIT (OUTPATIENT)
Dept: CARDIOLOGY | Facility: CLINIC | Age: 75
End: 2025-04-14
Payer: MEDICARE

## 2025-04-14 VITALS
HEIGHT: 67 IN | BODY MASS INDEX: 18.52 KG/M2 | DIASTOLIC BLOOD PRESSURE: 75 MMHG | SYSTOLIC BLOOD PRESSURE: 124 MMHG | HEART RATE: 80 BPM | WEIGHT: 118 LBS | OXYGEN SATURATION: 93 %

## 2025-04-14 DIAGNOSIS — E78.00 HYPERCHOLESTEREMIA: ICD-10-CM

## 2025-04-14 DIAGNOSIS — N18.4 STAGE 4 CHRONIC KIDNEY DISEASE (MULTI): ICD-10-CM

## 2025-04-14 DIAGNOSIS — I10 BENIGN ESSENTIAL HYPERTENSION: Primary | ICD-10-CM

## 2025-04-14 PROCEDURE — 1123F ACP DISCUSS/DSCN MKR DOCD: CPT | Performed by: INTERNAL MEDICINE

## 2025-04-14 PROCEDURE — G2211 COMPLEX E/M VISIT ADD ON: HCPCS | Performed by: INTERNAL MEDICINE

## 2025-04-14 PROCEDURE — 1159F MED LIST DOCD IN RCRD: CPT | Performed by: INTERNAL MEDICINE

## 2025-04-14 PROCEDURE — 4010F ACE/ARB THERAPY RXD/TAKEN: CPT | Performed by: INTERNAL MEDICINE

## 2025-04-14 PROCEDURE — 99213 OFFICE O/P EST LOW 20 MIN: CPT | Performed by: INTERNAL MEDICINE

## 2025-04-14 PROCEDURE — 3074F SYST BP LT 130 MM HG: CPT | Performed by: INTERNAL MEDICINE

## 2025-04-14 PROCEDURE — 3078F DIAST BP <80 MM HG: CPT | Performed by: INTERNAL MEDICINE

## 2025-04-14 PROCEDURE — 1157F ADVNC CARE PLAN IN RCRD: CPT | Performed by: INTERNAL MEDICINE

## 2025-04-14 PROCEDURE — 93005 ELECTROCARDIOGRAM TRACING: CPT | Performed by: INTERNAL MEDICINE

## 2025-04-14 PROCEDURE — 3008F BODY MASS INDEX DOCD: CPT | Performed by: INTERNAL MEDICINE

## 2025-04-14 PROCEDURE — 1160F RVW MEDS BY RX/DR IN RCRD: CPT | Performed by: INTERNAL MEDICINE

## 2025-04-14 PROCEDURE — 1126F AMNT PAIN NOTED NONE PRSNT: CPT | Performed by: INTERNAL MEDICINE

## 2025-04-14 RX ORDER — LOSARTAN POTASSIUM 50 MG/1
50 TABLET ORAL DAILY
Qty: 90 TABLET | Refills: 3 | Status: SHIPPED | OUTPATIENT
Start: 2025-04-14 | End: 2026-04-14

## 2025-04-14 RX ORDER — ROSUVASTATIN CALCIUM 20 MG/1
20 TABLET, COATED ORAL NIGHTLY
Qty: 90 TABLET | Refills: 3 | Status: SHIPPED | OUTPATIENT
Start: 2025-04-14 | End: 2026-04-14

## 2025-04-14 RX ORDER — DAPAGLIFLOZIN 10 MG/1
10 TABLET, FILM COATED ORAL DAILY
Qty: 90 TABLET | Refills: 3 | Status: SHIPPED | OUTPATIENT
Start: 2025-04-14 | End: 2026-04-14

## 2025-04-14 RX ORDER — AMLODIPINE BESYLATE 10 MG/1
10 TABLET ORAL
Qty: 90 TABLET | Refills: 3 | Status: SHIPPED | OUTPATIENT
Start: 2025-04-14 | End: 2026-04-14

## 2025-04-14 RX ORDER — METOPROLOL SUCCINATE 25 MG/1
25 TABLET, EXTENDED RELEASE ORAL DAILY
Qty: 90 TABLET | Refills: 3 | Status: SHIPPED | OUTPATIENT
Start: 2025-04-14 | End: 2026-04-14

## 2025-04-14 ASSESSMENT — ENCOUNTER SYMPTOMS: OCCASIONAL FEELINGS OF UNSTEADINESS: 1

## 2025-04-14 ASSESSMENT — PAIN SCALES - GENERAL: PAINLEVEL_OUTOF10: 0-NO PAIN

## 2025-04-14 NOTE — PROGRESS NOTES
Chief Complaint:   Follow-up     History Of Present Illness:    Charly Cornejo is a 74 y.o. male presenting with year old -American Male who is pertinent medical history notable for chronic alcohol use disorder, alcohol-induced pancreatitis, essential hypertension, chronic liver disease, COPD, left renal  mass with imaging consistent for renal cell carcinoma, pancreatic pseudocyst, tobacco abuse, hyperparathyroidism S/P parathyroidectomy who presents to cardiology clinic for follow up     He had a very complicated course from June through July 2020. Please see detailed encounters as well as discharge summaries found within the EMR. Since his recovery from his complicated course, he has been doing well from             able to walk and participate in his activities of daily living. He lives in a one floor apartment that he is able to managed by themselves. He does not endorse chest pain, heaviness, pressure. He has some moderate exertional shortness of breath which he reports is related to COPD. It is essentially unchanged. He continues to smoke, down to 10 cigarettes per day. He has completed pharmacological stress testing which was negative for inducible ischemia. He underwent open left radical nephrectomy on 2/15/2021. There, his intraoperative course and recovery was complicated by COPD exacerbation requiring intubation, sedation with interval development of alert large right pneumothorax drained by pigtail chest tube. He then underwent VATS for blebectomy on 02/22/2021 and ultimately was discharged home.      Since he was last seen, he unfortunately had additional pulmonary complications. He was admitted 9/24 -->10/7 for shortness of breath related to Pneumothorax. He was treated with CT but had continued air leak prompting OR for management. 9/27 he had a Bronchoscopy, BAL, Left VATS lower lobe wedge resection, lower lobe blebectomy, upper lobe wedge, mechanical pleurodesis - complete but had persistent  air leak. DVT scan done 10/4 positive for RUE DVT (axillary and brachial)--re imaged and seen by [facility] who felt that his represented untreated chronic thrombus and did not recommend anticoagulation due to Cirrhosis Hx. He had repeat Bronchoscopy due to Left Lower Lobe Collapse with thick secretions present.      Since his discharge, he has continued to recover. He feels improved and feels that his breathing is stable to improved as well. He voices no cardiac symptoms at this time. He feels that he has been doing well with his medical issues. He is hopeful to get outside and walk more with better weather.      He returns today (11/29/2022) for follow up. He has been in his usual state of health he has been working to address many of his chronic medical conditions. He has met with general surgery to have a scalp mass addressed and feels good about this. He had no undue complications related to this procedure.. He continues to smoke cigarettes, but is working on reducing this. He denies any cardiovascular complaints. He is planning on getting some dental work done to help address some other issues. He has paperwork that he request be out for his dentist.     Today ( 5/2/2023) he returns for follow up. He has finally gotten his dentures and is back to eating. meals. Feels much better about this. He has followed up with multiple specialist since he was last sen. He has been started on Farxiga for Kidney Disease and is tolering this well. He is compliant with medications and his results are doing well. He has not been able to give up smoking,still hovers around 1/2 PPD. Engaging in sexual relations without chest pressure, pain, shortness of breath beyond his baseline. Overall, he offers no significant cardiovascular complaints    2/9/2024 -- He returns for follow up. He has overall been well from a cardiac standpoint. He notes some left sided abdominal discomfort hat seemes to alternate between Constipation and  "Explosive Diarrhea. He reports considerable flatus during these episodes. States that he has been having some issues regarding staying well-hydrated as well as keeping his weight up..  He does note some continued shortness of breath but does not seem to have any change.    6/10/2024 --he returns for scheduled follow-up.  He overall has done well from a cardiovascular standpoint.  He continues to have issues with bloat.  He is surprised and shocked to see that his weight is plummeted as far as it has.  This is very distressing for him.  He has been trying to eat better to help support his weight.  [facility] was overall [facility] with the experience, but wants to continue with his current doctors secondary to convenience.    10/10/2024 -- He returns for follow up. He has esta blished care with a new PCP and is satisfied with his care.  He has aslo established with [facility] ( [facility] and  with extremely long wait listing) and had undergone testing fof COPD. His Spirometry reports a Moderate Restrictive process. Interestingly, his 6MWT did not show any significant desaturations despite requiring 4 LPM while in the office???  He has had several hospitalizations in the early summer due to COPD exacerbations, but now reports breathing is easier as the weather is getting cooler. He overall remains stable from a cardiac standpoint.  His biggest concern is that he cannot gain any weight despite supplementation. He is trying a \"Lactose Free\" protein supplement.     4/14/2025 '--> He returns for follow up and review of his Echocardiogram.  Since he was last seen, he has has had significant medical issues.  He has had several presentations for COPD exacerbations.  He notes that this is caused considerable stress and realization that continued behaviors would lead to poor outcomes.  He notes that after this most recent presentation, he returned home, tried to smoke a cigarette, found it extremely " "distasteful.  He then tried to smoke a second cigarette, again finding extremely distasteful.  He says that this was enough for him to quit smoking.  He otherwise has not had any issues from a cardiac standpoint.  His shortness of breath appears to be improving.  Oertli stabilizing.  He has been started on new COPD medication however, it caused symptomatology that he did not up tolerate.  As such, he stopped it.  He is planning on following up.  He is looking forward to initiating pulmonary rehab    Patient denies chest pain and angina. Pt denies worsening of his baseline shortness of breath, dyspnea on exertion, orthopnea, and paroxysmal nocturnal dyspnea.  He continues on 4 L supplemental oxygen at all times.      Pt denies worsening lower extremity edema. Pt denies palpitations or syncope. No recent falls. No fever or chills. No cough. No change in bowel or bladder habits. No travel. No sick contacts. No recent travel     Past medical history: As above.  Medications: Reviewed.  Allergies: Reviewed.  Social history: Patient reports continued cigarette use. He is currently at one half pack per day. He's been abstinent from alcohol since March 2020. He denies illicit drug use.  Family history: No sudden cardiac death or premature coronary artery disease..     Last Recorded Vitals:  Vitals:    04/14/25 1232   BP: 124/75   Patient Position: Sitting   Pulse: 80   SpO2: 93%   Weight: 53.5 kg (118 lb)   Height: 1.702 m (5' 7\")         Past Medical History:  He has a past medical history of Acute bronchitis (06/13/2023), Acute urinary retention (06/13/2023), Arthritis, Asteroid hyalosis, Cataract, COPD (chronic obstructive pulmonary disease) (Multi), Diabetic retinopathy (Multi), Glaucoma suspect of both eyes, Hyperlipidemia, Hypertension, Neuropathy, Other disorders of lung, Personal history of other diseases of the circulatory system (03/14/2017), Personal history of other diseases of the circulatory system, Personal " history of other diseases of the respiratory system, and Personal history of other specified conditions.    Past Surgical History:  He has a past surgical history that includes Other surgical history (10/13/2021); Other surgical history (10/22/2021); Hernia repair (10/16/2017); FL guided abdominal paracentesis (07/06/2020); Cataract extraction; Colonoscopy; Upper gastrointestinal endoscopy; Other surgical history; Other surgical history; and Other surgical history.      Social History:  He reports that he quit smoking about 2 months ago. His smoking use included cigarettes. He has never used smokeless tobacco. He reports that he does not currently use alcohol. He reports that he does not use drugs.    Family History:  Family History   Problem Relation Name Age of Onset    Diabetes Mother      Leukemia Mother      Diabetes Father      Diabetes Brother          Allergies:  Aspirin and Azithromycin    Outpatient Medications:  Current Outpatient Medications   Medication Instructions    albuterol 90 mcg/actuation inhaler 2 puffs, Every 4 hours PRN    allopurinol (ZYLOPRIM) 100 mg, Daily RT    amLODIPine (NORVASC) 10 mg, oral, Daily RT    budesonide-glycopyr-formoterol (Breztri Aerosphere) 160-9-4.8 mcg/actuation HFA aerosol inhaler 2 puffs, inhalation, 2 times daily    calcitriol (ROCALTROL) 0.25 mcg, oral, Daily    Farxiga 10 mg, oral, Daily    ferrous sulfate 325 (65 Fe) MG tablet 1 tablet    finasteride (PROSCAR) 5 mg, oral, Daily    folic acid (FOLVITE) 1 mg, oral, Daily    gabapentin (NEURONTIN) 200 mg, oral, 3 times daily    ipratropium-albuteroL (Duo-Neb) 0.5-2.5 mg/3 mL nebulizer solution INHALE THE CONTENTS OF 1   VIAL VIA NEBULIZER 4 TIMES A DAY    losartan (COZAAR) 50 mg, oral, Daily    metoprolol succinate XL (TOPROL-XL) 25 mg, oral, Daily    omeprazole (PriLOSEC) 20 mg DR capsule 1 capsule, Daily    oxygen (O2) therapy 4 Liter O2 - CONTINUOUS (route: Oxygen)    polyethylene glycol (GLYCOLAX, MIRALAX) 17 g,  "Daily PRN    roflumilast (DALIRESP) 250 mcg, oral, Daily    roflumilast (DALIRESP) 500 mcg, oral, Daily    rosuvastatin (CRESTOR) 20 mg, oral, Nightly    tamsulosin (FLOMAX) 0.4 mg, oral, Daily       Physical Exam:  /75 (Patient Position: Sitting)   Pulse 80   Ht 1.702 m (5' 7\")   Wt 53.5 kg (118 lb)   SpO2 93% Comment: 4L O2  BMI 18.48 kg/m²    General:  Patient is awake, alert, and oriented.  Patient is in no acute distress.  HEENT:  Pupils equal and reactive.  Normocephalic.  Moist mucosa.    Neck:  No thyromegaly.  Normal Jugular Venous Pressure.  Cardiovascular:  Regular rate and rhythm.  Normal S1 and S2.  1/6 SYBIL.  Pulmonary:  Clear to auscultation bilaterally.  Using supplemental oxygen at 4 LPM with rest .  Pursed lip breathing.  Using accessory muscles for breathing.  Tripod breathing positioning  Abdomen:  Soft. Non-tender.   Non-distended.  Positive bowel sounds.  Lower Extremities:  2+ pedal pulses. No LE edema.  Neurologic:  Cranial nerves intact.  No focal deficit.   Skin: Skin warm and dry, normal skin turgor.   Psychiatric: Normal affect.    Last Labs:  CBC -  Lab Results   Component Value Date    WBC 3.8 (L) 01/15/2024    HGB 12.4 (L) 01/15/2024    HCT 38.6 (L) 01/15/2024     (H) 01/15/2024     01/15/2024       CMP -  Lab Results   Component Value Date    CALCIUM 10.3 10/11/2024    PHOS 3.8 10/11/2024    PROT 7.0 01/15/2024    ALBUMIN 4.7 10/11/2024    AST 41 (H) 01/15/2024    ALT 49 01/15/2024    ALKPHOS 74 01/15/2024    BILITOT 0.7 01/15/2024       LIPID PANEL -   Lab Results   Component Value Date    CHOL 148 09/20/2024    TRIG 42 09/20/2024    HDL 79.2 09/20/2024    CHHDL 1.9 09/20/2024    LDLF 45 01/04/2023    VLDL 8 09/20/2024    NHDL 69 09/20/2024       RENAL FUNCTION PANEL -   Lab Results   Component Value Date    GLUCOSE 92 10/11/2024     10/11/2024    K 4.9 10/11/2024     10/11/2024    CO2 28 10/11/2024    ANIONGAP 15 10/11/2024    BUN 30 (H) " 10/11/2024    CREATININE 2.27 (H) 10/11/2024    GFRMALE 30 (A) 08/21/2023    CALCIUM 10.3 10/11/2024    PHOS 3.8 10/11/2024    ALBUMIN 4.7 10/11/2024        Lab Results   Component Value Date    BNP 28 01/15/2024    HGBA1C 5.6 01/18/2025    HGBA1C 5.5 09/20/2024       Last Cardiology Tests:  ECG:  In Office EKG 10/10/2024   -- Sinus Bradycardia at 59 BPM  with LVH Changes     ECG 12 lead 01/15/2024 (Preliminary)  In office EKG1/14/2021  -- Normal sinus rhythm at 70 bpm  --T-wave inversions in V4 through V6  -- Rare PAC    Echo:  Echocardiogram 04/2025   1. Spectral Doppler shows a Grade I (impaired relaxation pattern) of left ventricular diastolic filling with normal left atrial filling pressure.   2. There is normal right ventricular global systolic function.   3. Small to moderate pericardial effusion.   4. There is no evidence of cardiac tamponade.    Echocardiogram 11/2022  1. Left ventricular systolic function is normal with a 55-60% estimated ejection fraction.  2. Spectral Doppler shows an impaired relaxation pattern of left ventricular diastolic filling.  3. The left atrium is moderate to severely dilated.  4. RVSP within normal limits.     Echocardiogram 09/2022  1. Left ventricular systolic function is normal with a 55-60% estimated ejection fraction.  2. Spectral Doppler shows an impaired relaxation pattern of left ventricular diastolic filling.  3. Trace to mild mitral valve regurgitation.      Echocardiogram 06/2020  1. The left ventricular systolic function is low normal with a 50-55% estimated ejection fraction.  2. Spectral Doppler shows an impaired relaxation pattern of left ventricular diastolic filling.  3. RVSP within normal limits     Ejection Fractions:  EF   Date/Time Value Ref Range Status   04/01/2025 01:58 PM 62 %        Cath:  No results found for this or any previous visit from the past 1095 days.      Stress Test:  Nuclear pharmacological stress testing 1/2021  FINDINGS:  Stress and rest  "images both demonstrate a normal distribution of  perfusion throughout all LV segments with no sign of ischemia.     TID: 1.03     ECG-gated images demonstrate normal LV size and myocardial  contractility with an LV ejection fraction of 65 % (normal above 45  percent).     IMPRESSION:  1. Normal stress myocardial perfusion imaging in response to  pharmacologic stress.  2. Well-maintained left ventricular function.    Cardiac Imaging:  No results found for this or any previous visit from the past 1095 days.        Lab review: I have personally reviewed the laboratory result(s)   Diagnostic review: I have personally reviewed the result(s) of the EKG and Echocardiogram .   Imaging review: I have  personally reviewed the result(s)     Assessment/Plan     Problem List Items Addressed This Visit       Benign essential hypertension - Primary    Overview     Currently controlled         Relevant Medications    amLODIPine (Norvasc) 10 mg tablet    losartan (Cozaar) 50 mg tablet    metoprolol succinate XL (Toprol-XL) 25 mg 24 hr tablet    Other Relevant Orders    ECG 12 lead (Clinic Performed)    Chronic kidney disease, unspecified    Relevant Medications    Farxiga 10 mg tablet    Hypercholesteremia    Overview     The ASCVD Risk score (Debra BROWN, et al., 2019) failed to calculate for the following reasons:    The valid total cholesterol range is 130 to 320 mg/dL    Lab Results   Component Value Date    CHOL 124 01/04/2023    CHOL 108 03/16/2022    CHOL 198 01/14/2021     Lab Results   Component Value Date    HDL 69.6 01/04/2023    HDL 54.7 03/16/2022    HDL 54.7 01/14/2021     No results found for: \"LDLCALC\"  Lab Results   Component Value Date    TRIG 49 01/04/2023    TRIG 57 03/16/2022    TRIG 49 11/07/2018     No components found for: \"CHOLHDL\"           Relevant Medications    rosuvastatin (Crestor) 20 mg tablet     Overall, continues to remain stable from Cardiac standpoint  Echo showed preserved LVEF, RVSP couldn't be " calculated due to weak TR jet    Follow-up 4 months.  Reassess diet, weight gain  Echocariogram for RVSP / Right Heart dyusfunction in setting of COPD given multiple recent decompensations.       Gold Graham, DO

## 2025-04-15 LAB
ATRIAL RATE: 80 BPM
P AXIS: 80 DEGREES
P OFFSET: 209 MS
P ONSET: 159 MS
PR INTERVAL: 134 MS
Q ONSET: 226 MS
QRS COUNT: 13 BEATS
QRS DURATION: 82 MS
QT INTERVAL: 382 MS
QTC CALCULATION(BAZETT): 440 MS
QTC FREDERICIA: 420 MS
R AXIS: 85 DEGREES
T AXIS: 86 DEGREES
T OFFSET: 417 MS
VENTRICULAR RATE: 80 BPM

## 2025-04-22 ENCOUNTER — CLINICAL SUPPORT (OUTPATIENT)
Dept: CARDIAC REHAB | Facility: HOSPITAL | Age: 75
End: 2025-04-22
Payer: MEDICARE

## 2025-04-22 VITALS
SYSTOLIC BLOOD PRESSURE: 132 MMHG | DIASTOLIC BLOOD PRESSURE: 74 MMHG | HEIGHT: 67 IN | OXYGEN SATURATION: 100 % | WEIGHT: 123.6 LBS | HEART RATE: 71 BPM | BODY MASS INDEX: 19.4 KG/M2

## 2025-04-22 DIAGNOSIS — J44.9 CHRONIC OBSTRUCTIVE PULMONARY DISEASE, UNSPECIFIED COPD TYPE (MULTI): Primary | ICD-10-CM

## 2025-04-22 SDOH — HEALTH STABILITY: PHYSICAL HEALTH: ON AVERAGE, HOW MANY MINUTES DO YOU ENGAGE IN EXERCISE AT THIS LEVEL?: 0 MIN

## 2025-04-22 SDOH — HEALTH STABILITY: PHYSICAL HEALTH: ON AVERAGE, HOW MANY DAYS PER WEEK DO YOU ENGAGE IN MODERATE TO STRENUOUS EXERCISE (LIKE A BRISK WALK)?: 0 DAYS

## 2025-04-22 ASSESSMENT — PATIENT HEALTH QUESTIONNAIRE - PHQ9
2. FEELING DOWN, DEPRESSED OR HOPELESS: NOT AT ALL
4. FEELING TIRED OR HAVING LITTLE ENERGY: NOT AT ALL
9. THOUGHTS THAT YOU WOULD BE BETTER OFF DEAD, OR OF HURTING YOURSELF: NOT AT ALL
8. MOVING OR SPEAKING SO SLOWLY THAT OTHER PEOPLE COULD HAVE NOTICED. OR THE OPPOSITE, BEING SO FIGETY OR RESTLESS THAT YOU HAVE BEEN MOVING AROUND A LOT MORE THAN USUAL: NOT AT ALL
SUM OF ALL RESPONSES TO PHQ QUESTIONS 1-9: 4
6. FEELING BAD ABOUT YOURSELF - OR THAT YOU ARE A FAILURE OR HAVE LET YOURSELF OR YOUR FAMILY DOWN: MORE THAN HALF THE DAYS
3. TROUBLE FALLING OR STAYING ASLEEP OR SLEEPING TOO MUCH: NOT AT ALL
1. LITTLE INTEREST OR PLEASURE IN DOING THINGS: NOT AT ALL
5. POOR APPETITE OR OVEREATING: NOT AT ALL
7. TROUBLE CONCENTRATING ON THINGS, SUCH AS READING THE NEWSPAPER OR WATCHING TELEVISION: MORE THAN HALF THE DAYS
SUM OF ALL RESPONSES TO PHQ QUESTIONS 1-9: 4
SUM OF ALL RESPONSES TO PHQ9 QUESTIONS 1 & 2: 0

## 2025-04-22 ASSESSMENT — DUKE ACTIVITY SCORE INDEX (DASI)
CAN YOU TAKE CARE OF YOURSELF (EAT, DRESS, BATHE, OR USE TOILET): YES
CAN YOU WALK INDOORS, SUCH AS AROUND YOUR HOUSE: YES
CAN YOU PARTICIPATE IN MODERATE RECREATIONAL ACTIVITIES LIKE GOLF, BOWLING, DANCING, DOUBLES TENNIS OR THROWING A BASEBALL OR FOOTBALL: NO
CAN YOU HAVE SEXUAL RELATIONS: YES
CAN YOU DO MODERATE WORK AROUND THE HOUSE LIKE VACUUMING, SWEEPING FLOORS OR CARRYING GROCERIES: NO
CAN YOU RUN A SHORT DISTANCE: NO
CAN YOU DO HEAVY WORK AROUND THE HOUSE LIKE SCRUBBING FLOORS OR LIFTING AND MOVING HEAVY FURNITURE: NO
CAN YOU PARTICIPATE IN STRENOUS SPORTS LIKE SWIMMING, SINGLES TENNIS, FOOTBALL, BASKETBALL, OR SKIING: NO
CAN YOU WALK A BLOCK OR TWO ON LEVEL GROUND: NO
CAN YOU DO YARD WORK LIKE RAKING LEAVES, WEEDING OR PUSHING A MOWER: NO
CAN YOU CLIMB A FLIGHT OF STAIRS OR WALK UP A HILL: NO
CAN YOU DO LIGHT WORK AROUND THE HOUSE LIKE DUSTING OR WASHING DISHES: NO
DASI METS SCORE: 3.9
TOTAL_SCORE: 9.75

## 2025-04-22 NOTE — PROGRESS NOTES
Pulmonary Rehabilitation Initial Treatment Plan    Name: Charly Cornejo   Medical Record Number: [id number]  YOB: 1950   Age: 74 y.o.  Today’s Date: 4/22/2025   Primary Care Physician: Jermaine Jimenez DO   Referring Physician: Abi Villarreal MD   Program Location: [address]  Primary Diagnosis: Chronic obstructive pulmonary disease, unspecified COPD type (Multi) [J44.9]   Onset/Date of Diagnosis:   Roughly around 2022 (as reported by patient)  Session #: 0  AACVPR Risk Stratification: High   Falls Risk: Medium    ------------------------------------------------------------------  Psychosocial Initial Assessment:  ------------------------------------------------------------------  Stress Assessment  Pre PHQ-9: 4     Pre PHQ-9: 4     Sent PH-Q 9 to MD if score > 20: No; score < 20    Quality of Life Survey:   Pre CAT score: 26   Post CAT score: Survey to be given at discharge.     Pt reported/currently experiencing stress: Yes; Stress; Severity: mild  Patient uses stress management skills: No   History of: no history of anxiety or depression  Currently seeing a mental health provider: No  Social Support: Yes, Whom:Family    Learning Assessment  Learning assessment/barriers: Transportation  Preferred learning method: Auditory, Visual, Reading handout, and Writing handout  Barriers: None  Comments:    Stages of Change: Preparation    Psychosocial Plan  Goal Status: Initial Assessment; goals not yet started  Psychosocial Goals: Demonstrating proper techniques for stress management and Maintain or lower PH-Q 9 score by discharge  Psychosocial Interventions/Education:   To be done in Pulmonary Rehab.     ------------------------------------------------------------------  Oxygen Initial Assessment:  ------------------------------------------------------------------  SpO2 at rest: 100 %  SpO2 with exertion: TBD %    Oxygen Use  Supplemental O2 prescribed: Yes,   Liters at rest: 4  "L  Liters with exertion: 4 L    SpO2 at rest: 100 %  SpO2 with exertion: TBD %    Using O2 as prescribed: Yes  DME: [employer]      Demonstrates appropriate knowledge of O2 use: Yes   Demonstrates appropriate knowledge of O2 safety: Yes     Home O2 System: Concentrator  Portable O2 System: Portable Tank  Hypoxia managed: Yes   Home Pulse Oximeter: Yes     Pre MMRC: 3  Post MMRC: To be done at discharge.     Oxygen Plan  Goal Status: Initial Assessment; goals not yet started  Oxygen Goals: Decrease MMRC score, Learn and use diaphragmatic breathing as needed, and Learn and use pursed lip breathing as needed  Oxygen Education/Interventions:   To be done in Pulmonary Rehab.    ------------------------------------------------------------------  Nutrition Initial Assessment:  ------------------------------------------------------------------  Diet Habit Survey: Picture Your Plate  Pre: Survey to be given during initial exercise session.  Post: To be done at discharge.    Survey results reviewed with Dietician: Not at this time    Lipids Assessment  Current Dietary Guidelines:  None  Barriers to dietary change: no  Hyperlipidemia: No   Lab Results   Component Value Date    CHOL 148 09/20/2024    HDL 79.2 09/20/2024    LDLCALC 60 09/20/2024    TRIG 42 09/20/2024     Diabetes Assessment  History of Diabetes: No  Lab Results   Component Value Date    HGBA1C 5.6 01/18/2025      Weight Management  Weight: 56.1 kg (123 lb 9.6 oz)  Height: 170.2 cm (5' 7\")  BMI (Calculated): 19.35   Nutrition Plan  Goal Status: Initial Assessment; goals not yet started  Nutrition Goals: Improve Diet Habit Survey score by 5-10 points by discharge, Adapt a low-sodium, DASH diet prior to discharge, and Learn how to read and interpret nutrition labels prior to discharge  Nutrition Interventions/Education:   To be done in Pulmonary Rehab.    ------------------------------------------------------------------  Exercise Initial " Assessment:  ------------------------------------------------------------------  Home Exercise  Current Home Exercise?: No  Mode: NA  Frequency: NA  Duration: NA   Home Exercise Prescription given: To be given prior to discharge from program.    6 Minute Walk Assessment   6 MWT distance:   TBD  FiO2 used during test: TBD Liters    Exercise Prescription  Exercise Prescription based on: Duke Activity Status Index (DASI): DASI Score: 9.75  MET Score: 3.9       Resistance Training: No   Frequency:  2 days/week  Mode: NuStep and Recumbent Cycle  Duration: 24 total aerobic minutes  Intensity: RPE 12-16  Target HR:  To be calculated after 6 attended sessions.  MET Level: 2.0  Patient wears supplemental O2: Yes;   O2 Flow Rate: 4 to 6 L/min  SpO2 Range: 88 to 100 %  Modality Workload METs Duration (minutes)   Pre-Exercise      NuStep 4000 1 load @ 28 oneal 2.0 8 :00   NuStep 4000 1 load @ 28 oneal 2.0 8 :00   Recumbent Bike 2 load @ 50 rpms 2.0 8 :00   Post-Exercise           Exercise Plan  Goal Status: Initial Assessment; goals not yet started  Exercise Goals: Increase exercise MET level by 5-10% each week, Increase total exercise duration to 30-45 minutes, Obtain 150 minutes/week of moderate intensity aerobic exercise, and Establish a home exercise program before discharge  Exercise Interventions/Education:   To be done in Pulmonary Rehab.    ------------------------------------------------------------------  Other Core Components/Risk Factor Initial Assessment:  ------------------------------------------------------------------  Medication adherence  Medication compliance: Yes  Uses pill box/organizer: No   Carries medication list: No   Uses Inhalers appropriately: Yes   Current Medications:   Medication Documentation Review Audit       Reviewed by Gold Graham DO (Physician) on 04/14/25 at 1952      Medication Order Taking? Sig Documenting Provider Last Dose Status   albuterol 90 mcg/actuation inhaler [id number] Yes  Inhale 2 puffs every 4 hours if needed for shortness of breath or wheezing. Historical Provider, MD Taking Active   allopurinol (Zyloprim) 100 mg tablet [id number] Yes Take 1 tablet (100 mg) by mouth once daily. Historical Provider, MD Taking Active   amLODIPine (Norvasc) 10 mg tablet [id number] Yes Take 1 tablet (10 mg) by mouth once daily. Historical Provider, MD Taking Active   budesonide-glycopyr-formoterol (Breztri Aerosphere) 160-9-4.8 mcg/actuation HFA aerosol inhaler [id number] Yes Inhale 2 puffs 2 times a day. Jermaine Jimenez, DO  Active   calcitriol (Rocaltrol) 0.25 mcg capsule [id number] Yes Take 1 capsule (0.25 mcg) by mouth once daily. Jermaine Jimenez DO  Active   Farxiga 10 mg [id number] Yes Take 1 tablet (10 mg) by mouth once daily. Jermaine Jimenez DO Taking Active   ferrous sulfate 325 (65 Fe) MG tablet [id number] Yes Take 1 tablet (325 mg) by mouth. Historical Provider, MD Taking Active   finasteride (Proscar) 5 mg tablet [id number] Yes Take 1 tablet (5 mg) by mouth once daily. Jermaine Jimenez DO  Active   folic acid (Folvite) 1 mg tablet [id number] Yes Take 1 tablet (1 mg) by mouth once daily. Jermaine Jimenez DO  Active   gabapentin (Neurontin) 100 mg capsule [id number] Yes Take 2 capsules (200 mg) by mouth 3 times a day. Jermaine Jimenez DO  Active   ipratropium-albuteroL (Duo-Neb) 0.5-2.5 mg/3 mL nebulizer solution [id number] Yes INHALE THE CONTENTS OF 1   VIAL VIA NEBULIZER 4 TIMES A DAY BRUNO Mensah Taking Active   losartan (Cozaar) 50 mg tablet [id number] Yes Take 1 tablet (50 mg) by mouth once daily. Gold Graham, DO Taking Active   metoprolol succinate XL (Toprol-XL) 25 mg 24 hr tablet [id number] Yes Take 1 tablet (25 mg) by mouth once daily. Gold Graham, DO Taking Active   omeprazole (PriLOSEC) 20 mg DR capsule [id number] Yes Take 1 capsule (20 mg) by mouth once daily. Historical Provider, MD Taking Active   oxygen (O2) therapy [id number] Yes 4 Liter O2 - CONTINUOUS (route: Oxygen) Historical Provider,  "MD Taking Active   polyethylene glycol (Glycolax, Miralax) 17 gram packet [id number] Yes Take 17 g by mouth once daily as needed. Historical Provider, MD Taking Active   roflumilast (Daliresp) 250 mcg tablet [id number]  Take 1 tablet (250 mcg) by mouth once daily. Abi Villarreal MD   25 2359   roflumilast (Daliresp) 500 mcg tablet [id number]  Take 1 tablet (500 mcg) by mouth once daily. Do not fill before 2025.   Patient not taking: Reported on 2025    Abi Villarreal MD  Active   rosuvastatin (Crestor) 20 mg tablet [id number] Yes Take 1 tablet (20 mg) by mouth once daily at bedtime. Gold Graham DO Taking Active   tamsulosin (Flomax) 0.4 mg 24 hr capsule [id number] Yes Take 1 capsule (0.4 mg) by mouth once daily. Jermaine Jimenez,   Active                   Blood Pressure Management  History of Hypertension: Yes   Medication Changes: No   Resting BP:  /74 (BP Location: Right arm, Patient Position: Sitting)   Pulse 71   Ht 1.702 m (5' 7\")   Wt 56.1 kg (123 lb 9.6 oz)   SpO2 100%   BMI 19.36 kg/m²    Heart Failure Management  Hx of Heart Failure: No  Smoking/Tobacco Assessment  Social History[1]   Other Core Component Plan  Goal Status: Initial Assessment; goals not yet started  Other Core Component Goals: Verbalize medication usage and drug actions by discharge and Achieve resting BP of < 130/80 by discharge  Other Core Component Interventions/Education:   To be done in Pulmonary Rehab.    Individual Patient Goals:  To be able to clean (floor, tub and make bed) while bending  To be able walk a block by discharge  Goal Status: Initial Assessment; goals not yet started    Staff Comments:  Initial assessment done In Person at [facility]. Patient plans to attend initial session 2025 at 1:00 PM.      Rehab Staff Signature: MARY Izquierdo-CADY        [1]   Social History  Tobacco Use    Smoking status: Former     Current packs/day: 0.00     Types: Cigarettes     " Quit date: 2025     Years since quittin.2    Smokeless tobacco: Never    Tobacco comments:     Gets SOB with activity/stairs if not wearing O2 (has COPD). Can lay flat. Uses a cane. No illness last 30 days. Denies any personal or family member reaction to anesthesia.    Vaping Use    Vaping status: Never Used   Substance Use Topics    Alcohol use: Not Currently     Comment: quit drinking 6 years ago.    Drug use: Never

## 2025-04-24 ENCOUNTER — TELEPHONE (OUTPATIENT)
Dept: CARDIAC REHAB | Facility: HOSPITAL | Age: 75
End: 2025-04-24
Payer: MEDICARE

## 2025-04-24 NOTE — PROGRESS NOTES
PATIENT: Charly Cornejo  DATE: 4/24/2025    Patient called regarding Pulmonary Rehab at [facility].  Patient informed the rehab staff that he would not be able to come for the pulmonary rehab sessions due to transportation cost and copay.    Mita Muhammad, ACSM-EP

## 2025-04-29 ENCOUNTER — APPOINTMENT (OUTPATIENT)
Dept: CARDIAC REHAB | Facility: HOSPITAL | Age: 75
End: 2025-04-29
Payer: MEDICARE

## 2025-04-29 ENCOUNTER — PATIENT OUTREACH (OUTPATIENT)
Dept: PRIMARY CARE | Facility: CLINIC | Age: 75
End: 2025-04-29
Payer: MEDICARE

## 2025-05-01 ENCOUNTER — APPOINTMENT (OUTPATIENT)
Dept: CARDIAC REHAB | Facility: HOSPITAL | Age: 75
End: 2025-05-01
Payer: MEDICARE

## 2025-05-05 ENCOUNTER — HOSPITAL ENCOUNTER (OUTPATIENT)
Dept: RADIOLOGY | Facility: HOSPITAL | Age: 75
Discharge: HOME | End: 2025-05-05
Payer: MEDICARE

## 2025-05-05 DIAGNOSIS — J44.9 CHRONIC OBSTRUCTIVE PULMONARY DISEASE, UNSPECIFIED COPD TYPE (MULTI): ICD-10-CM

## 2025-05-05 PROCEDURE — 74230 X-RAY XM SWLNG FUNCJ C+: CPT

## 2025-05-05 PROCEDURE — 2500000005 HC RX 250 GENERAL PHARMACY W/O HCPCS: Performed by: INTERNAL MEDICINE

## 2025-05-05 PROCEDURE — 74230 X-RAY XM SWLNG FUNCJ C+: CPT | Performed by: RADIOLOGY

## 2025-05-05 PROCEDURE — 92611 MOTION FLUOROSCOPY/SWALLOW: CPT | Mod: GN

## 2025-05-05 RX ADMIN — BARIUM SULFATE 10 ML: 400 PASTE ORAL at 13:30

## 2025-05-05 RX ADMIN — BARIUM SULFATE 700 MG: 700 TABLET ORAL at 13:30

## 2025-05-05 RX ADMIN — BARIUM SULFATE 70 ML: 400 SUSPENSION ORAL at 13:30

## 2025-05-05 RX ADMIN — BARIUM SULFATE 90 ML: 0.81 POWDER, FOR SUSPENSION ORAL at 13:30

## 2025-05-05 NOTE — PROGRESS NOTES
Speech-Language Pathology    Outpatient Modified Barium Swallow Study    Patient Name: Charly Cornejo  MRN: [id number]  : 1950  Today's Date: 25     Time Calculation  Start Time: 1325  Stop Time: 1345  Time Calculation (min): 20 min       Modified Barium Swallow Study completed. Informed verbal consent obtained prior to completion of exam. Trials of thin, nectar/mildly thick liquid, puree, regular solids and barium tablet with thin liquid were given.     Modified Barium Swallow Study completed. Informed verbal consent obtained prior to completion of exam. The study was completed per protocol with various liquid barium consistencies, pudding, solids and a 13mm barium tablet.  A 1.9 cm or .75 inch (outer diameter) ring was NOT placed on the chin in the lateral view due to facial hair, however, it was placed on the lateral, left side of the neck in the a-p view in order to complete objective measurements during swallowing. The anatomic structures and function of the oropharynx, larynx, hypopharynx and cervical esophagus were evaluated.    SLP: CHACHO Hernandez   Contact info: [employer]; phone: [phone] Option 2    Reason for Referral: patient is unsure why he was sent for testing though he has had some weight loss. He does have a history of COPD per chart.  Patient Hx per chart:   chronic alcohol use disorder, alcohol-induced pancreatitis, essential hypertension, chronic liver disease, COPD, left renal  mass with imaging consistent for renal cell carcinoma, pancreatic pseudocyst, tobacco abuse, hyperparathyroidism S/P parathyroidectomy  Respiratory Status: nasal canula  Current diet: regular/thin liquids    Pain:  Pain Scale: 0-10  Ratin      DIET RECOMMENDATIONS:   The patient has been educated and understands all SILENT aspiration risks with thin liquids and would like to continue with thin liquids.  He understands modifications to the diet will not eliminate aspiration, it should  reduce risks. He will consider therapy and thickener use in the future if he/his medical team feels this is needed.  Adequate teach back provided by patient.     - Regular (IDDSI Level 7)  - Thin liquids (IDDSI Level 0)  Per the results of today's MBSS, patient to continue baseline diet of regular consistencies and thin liquids following swallow strategies listed below:    STRATEGIES:  - Small, single sips  - Complete oral care frequently throughout the day      SLP PLAN:  Skilled SLP Services: Skilled SLP intervention for dysphagia is warranted. However, the patient would like to discuss with his physician as he has multiple medical issues at this time. SLP contact information was provided for the patient to reach out with any additional needs.    Treatment/Interventions:   - Oropharyngeal exercises  - Compensatory strategy training  - Patient/caregiver education    Discussed POC: Patient  Discussed Risks/Benefits: Yes  Patient/Caregiver Agreeable: Yes      Education Provided: Discussed results and recommendations including video review of MBSS. Verbal understanding and agreement given on all accounts.   The patient has been educated and understands all SILENT aspiration risks with thin liquids and would like to continue with thin liquids.  He understands modifications to the diet will not eliminate aspiration, it should reduce risks. He has been educated on oral care needs to reduce oral bacteria to reduce aspiration pneumonia risks. He will consider thickener and therapy in the future if he/his medical team feels this is needed.  Adequate teach back provided by patient.     Repeat Study: Per MD or treating SLP    Mechanics of the Swallow Summary:  ORAL PHASE:  Lip Closure - No labial escape/anterior loss of bolus   Tongue Control During Bolus Hold - Escape to lateral buccal cavity and/or floor of mouth   Bolus prep/mastication - Timely and efficient mastication skills   Bolus transport/lingual motion - Brisk  tongue motion for A-P movement of the bolus   Oral residue - Residue collection on oral structure     PHARYNGEAL PHASE:  Initiation of pharyngeal swallow - Bolus head at pit of pyriforms   Soft palate elevation - No bolus between soft palate/pharyngeal wall   Laryngeal elevation - Complete superior movement of thyroid cartilage with contact of arytenoids to epiglottic petiole   Anterior hyoid excursion - Complete anterior movement   Epiglottic movement - Complete inversion    Laryngeal vestibule closure - Incomplete - narrow column of air/contrast in laryngeal vestibule   Pharyngeal stripping wave - Complete  Pharyngeal contraction (A/P view) - Complete  Pharyngoesophageal segment opening - Partial distension/partial duration with partial obstruction of flow of bolus   Tongue base retraction - Narrow column of contrast or air between tongue base and pharyngeal wall   Pharyngeal residue - Trace residue within or on the pharyngeal structures     ESOPHAGEAL PHASE:  Esophageal clearance - Complete clearance     Noted osteophytes throughout cervical spine    SLP Impressions with Severity Rating:   Pt presents with SILENT aspiration with thin liquids - more often with multiple consecutive sips upon completion of modified barium swallow study this date. Swallowing physiology is detailed above.     Impairments most impacting swallowing safety and efficiency include:  Silent aspiration due to delayed swallow initiation with liquids.  Partial distension/duration of pharyngoesophageal segment, however, all trials pass through PES.      No further penetration or aspiration was observed for any other consistency.    *Of note: The A-P bolus follow-through is not intended to be utilized as a diagnostic assessment of the esophagus, rather a tool to observe the biomechanical aspects of the swallow continuum and to inform the need for further evaluation by medical specialists, as applicable.     Strategies attempted- Chin tuck did  not improve swallow initiation timing      OUTCOME MEASURES:  Functional Oral Intake Scale  Functional Oral Intake Scale: Level 7        total oral diet with no restrictions       Eating Assessment Tool (EAT-10)   0=No problem, 1=Mild problem, 2=Mild to moderate problem, 3=Moderate problem, 4=Severe problem    EAT 10  My swallowing problem has caused me to lose weight.: 2  My swallowing problem interferes with my ability to go out for meals.: 0  Swallowing liquids takes extra effort.: 0  Swallowing solids takes extra effort.: 1  Swallowing pills takes extra effort.: 0  Swallowing is painful: 0  The pleasure of eating is affected by my swallowing.: 2  When I swallow food sticks in my throat.: 2  I cough when I eat.: 0  Swallowing is stressful: 0  EAT-10 TOTAL SCORE:: 7    A total score of 3 or above may indicate difficulty with swallowing safely and/or efficiently    Rosenbek's Penetration Aspiration Scale  Thin Liquids: 8. SILENT ASPIRATION - contrast passes glottis, visible residue, NO pt response]   Nectar Thick Liquids: 1. NO ASPIRATION & NO PENETRATION - no aspiration, contrast does not enter airway  Puree: 1. NO ASPIRATION & NO PENETRATION - no aspiration, contrast does not enter airway  Solids: 1. NO ASPIRATION & NO PENETRATION - no aspiration, contrast does not enter airway

## 2025-05-06 ENCOUNTER — APPOINTMENT (OUTPATIENT)
Dept: CARDIAC REHAB | Facility: HOSPITAL | Age: 75
End: 2025-05-06
Payer: MEDICARE

## 2025-05-06 DIAGNOSIS — R13.10 DYSPHAGIA, UNSPECIFIED TYPE: Primary | ICD-10-CM

## 2025-05-08 ENCOUNTER — APPOINTMENT (OUTPATIENT)
Dept: CARDIAC REHAB | Facility: HOSPITAL | Age: 75
End: 2025-05-08
Payer: MEDICARE

## 2025-05-08 PROCEDURE — RXMED WILLOW AMBULATORY MEDICATION CHARGE

## 2025-05-12 ENCOUNTER — PHARMACY VISIT (OUTPATIENT)
Dept: PHARMACY | Facility: CLINIC | Age: 75
End: 2025-05-12
Payer: COMMERCIAL

## 2025-05-12 DIAGNOSIS — E78.00 HYPERCHOLESTEREMIA: ICD-10-CM

## 2025-05-12 DIAGNOSIS — N18.4 STAGE 4 CHRONIC KIDNEY DISEASE (MULTI): ICD-10-CM

## 2025-05-12 DIAGNOSIS — I10 BENIGN ESSENTIAL HYPERTENSION: ICD-10-CM

## 2025-05-12 RX ORDER — LOSARTAN POTASSIUM 50 MG/1
50 TABLET ORAL DAILY
Qty: 90 TABLET | Refills: 3 | Status: SHIPPED | OUTPATIENT
Start: 2025-05-12 | End: 2026-05-12

## 2025-05-12 RX ORDER — METOPROLOL SUCCINATE 25 MG/1
25 TABLET, EXTENDED RELEASE ORAL DAILY
Qty: 90 TABLET | Refills: 3 | Status: SHIPPED | OUTPATIENT
Start: 2025-05-12 | End: 2026-05-12

## 2025-05-12 RX ORDER — ROSUVASTATIN CALCIUM 20 MG/1
20 TABLET, COATED ORAL NIGHTLY
Qty: 90 TABLET | Refills: 3 | Status: SHIPPED | OUTPATIENT
Start: 2025-05-12 | End: 2026-05-12

## 2025-05-12 RX ORDER — AMLODIPINE BESYLATE 10 MG/1
10 TABLET ORAL
Qty: 90 TABLET | Refills: 3 | Status: SHIPPED | OUTPATIENT
Start: 2025-05-12 | End: 2026-05-12

## 2025-05-12 RX ORDER — DAPAGLIFLOZIN 10 MG/1
10 TABLET, FILM COATED ORAL DAILY
Qty: 90 TABLET | Refills: 3 | Status: SHIPPED | OUTPATIENT
Start: 2025-05-12 | End: 2025-05-13 | Stop reason: SDUPTHER

## 2025-05-13 ENCOUNTER — APPOINTMENT (OUTPATIENT)
Dept: CARDIAC REHAB | Facility: HOSPITAL | Age: 75
End: 2025-05-13
Payer: MEDICARE

## 2025-05-13 DIAGNOSIS — N18.4 STAGE 4 CHRONIC KIDNEY DISEASE (MULTI): ICD-10-CM

## 2025-05-13 RX ORDER — DAPAGLIFLOZIN 10 MG/1
10 TABLET, FILM COATED ORAL DAILY
Qty: 90 TABLET | Refills: 3 | Status: SHIPPED | OUTPATIENT
Start: 2025-05-13 | End: 2026-05-13

## 2025-05-14 ENCOUNTER — APPOINTMENT (OUTPATIENT)
Dept: PRIMARY CARE | Facility: CLINIC | Age: 75
End: 2025-05-14
Payer: MEDICARE

## 2025-05-14 VITALS
OXYGEN SATURATION: 93 % | DIASTOLIC BLOOD PRESSURE: 73 MMHG | HEART RATE: 64 BPM | BODY MASS INDEX: 18.99 KG/M2 | WEIGHT: 121 LBS | SYSTOLIC BLOOD PRESSURE: 135 MMHG | HEIGHT: 67 IN

## 2025-05-14 DIAGNOSIS — Z79.4 TYPE 2 DIABETES MELLITUS WITHOUT COMPLICATION, WITH LONG-TERM CURRENT USE OF INSULIN: ICD-10-CM

## 2025-05-14 DIAGNOSIS — Z00.00 ROUTINE GENERAL MEDICAL EXAMINATION AT HEALTH CARE FACILITY: Primary | ICD-10-CM

## 2025-05-14 DIAGNOSIS — I31.39 PERICARDIAL EFFUSION (HHS-HCC): ICD-10-CM

## 2025-05-14 DIAGNOSIS — J96.11 CHRONIC RESPIRATORY FAILURE WITH HYPOXIA: ICD-10-CM

## 2025-05-14 DIAGNOSIS — E11.9 TYPE 2 DIABETES MELLITUS WITHOUT COMPLICATION, WITH LONG-TERM CURRENT USE OF INSULIN: ICD-10-CM

## 2025-05-14 DIAGNOSIS — C64.2 RENAL CELL CARCINOMA OF LEFT KIDNEY: ICD-10-CM

## 2025-05-14 DIAGNOSIS — Z12.11 SCREEN FOR COLON CANCER: ICD-10-CM

## 2025-05-14 DIAGNOSIS — Z12.5 ENCOUNTER FOR SCREENING PROSTATE SPECIFIC ANTIGEN (PSA) MEASUREMENT: ICD-10-CM

## 2025-05-14 PROCEDURE — 3008F BODY MASS INDEX DOCD: CPT | Performed by: INTERNAL MEDICINE

## 2025-05-14 PROCEDURE — 1159F MED LIST DOCD IN RCRD: CPT | Performed by: INTERNAL MEDICINE

## 2025-05-14 PROCEDURE — G8433 SCR FOR DEP NOT CPT DOC RSN: HCPCS | Performed by: INTERNAL MEDICINE

## 2025-05-14 PROCEDURE — 99397 PER PM REEVAL EST PAT 65+ YR: CPT | Performed by: INTERNAL MEDICINE

## 2025-05-14 PROCEDURE — 1160F RVW MEDS BY RX/DR IN RCRD: CPT | Performed by: INTERNAL MEDICINE

## 2025-05-14 PROCEDURE — 3075F SYST BP GE 130 - 139MM HG: CPT | Performed by: INTERNAL MEDICINE

## 2025-05-14 PROCEDURE — 4010F ACE/ARB THERAPY RXD/TAKEN: CPT | Performed by: INTERNAL MEDICINE

## 2025-05-14 PROCEDURE — 1170F FXNL STATUS ASSESSED: CPT | Performed by: INTERNAL MEDICINE

## 2025-05-14 PROCEDURE — G0439 PPPS, SUBSEQ VISIT: HCPCS | Performed by: INTERNAL MEDICINE

## 2025-05-14 PROCEDURE — 3078F DIAST BP <80 MM HG: CPT | Performed by: INTERNAL MEDICINE

## 2025-05-14 ASSESSMENT — ENCOUNTER SYMPTOMS
ABDOMINAL PAIN: 0
ACTIVITY CHANGE: 0
BLOOD IN STOOL: 0
CHEST TIGHTNESS: 0
COUGH: 0
CONFUSION: 0
JOINT SWELLING: 0
DIFFICULTY URINATING: 0
EYE REDNESS: 0
HEADACHES: 0
SPEECH DIFFICULTY: 0
CHOKING: 0
HYPERACTIVE: 0
FATIGUE: 0
EYE PAIN: 0
ARTHRALGIAS: 0
FEVER: 0
VOMITING: 0
APPETITE CHANGE: 0
AGITATION: 0
BACK PAIN: 0
LIGHT-HEADEDNESS: 0
POLYDIPSIA: 0
DIAPHORESIS: 0
SHORTNESS OF BREATH: 0
FACIAL ASYMMETRY: 0
EYE ITCHING: 0
NERVOUS/ANXIOUS: 0
NUMBNESS: 0
DYSPHORIC MOOD: 0
POLYPHAGIA: 0
NAUSEA: 0
EYE DISCHARGE: 0
FACIAL SWELLING: 0
ABDOMINAL DISTENTION: 0

## 2025-05-14 ASSESSMENT — ACTIVITIES OF DAILY LIVING (ADL)
GROCERY_SHOPPING: INDEPENDENT
DRESSING: INDEPENDENT
BATHING: INDEPENDENT
DOING_HOUSEWORK: INDEPENDENT
TAKING_MEDICATION: INDEPENDENT
MANAGING_FINANCES: INDEPENDENT

## 2025-05-14 NOTE — ASSESSMENT & PLAN NOTE
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  Repeat renal function and continue following with nephrology and urology

## 2025-05-14 NOTE — PROGRESS NOTES
Subjective   Reason for Visit: Charly Cornejo is an 74 y.o. male here for a Medicare Wellness visit.     Past Medical, Surgical, and Family History reviewed and updated in chart.    Reviewed all medications by prescribing practitioner or clinical pharmacist (such as prescriptions, OTCs, herbal therapies and supplements) and documented in the medical record.    HPI    Patient is a 74-year-old male with past medical history of hypertension and gout chronic kidney disease stage III BPH dyslipidemia who presents for wellness exam.  No specific complaints at this time.  Using oxygen chronically at 2 L nasal cannula.  Following with pulmonary medicine.  No recent exacerbations.  Was admitted to the hospital for flu earlier this year.  Takes his Breztri twice daily as prescribed.    Due for repeat colonoscopy.  Follows with nephrology for chronic kidney disease.    Blood pressure well-controlled on current medications no headache changes in vision orthopnea.    Takes MiraLAX as needed for constipation.  Denies illicit substances smoking or alcohol at this time.    Patient Care Team:  Jermaine Jimenez DO as PCP - General (Internal Medicine)  Olinda Lyons PharmD as Pharmacist (Pharmacy)     Review of Systems   Constitutional:  Negative for activity change, appetite change, diaphoresis, fatigue and fever.   HENT:  Negative for dental problem, drooling, ear pain, facial swelling, hearing loss and nosebleeds.    Eyes:  Negative for pain, discharge, redness and itching.   Respiratory:  Negative for cough, choking, chest tightness and shortness of breath.    Gastrointestinal:  Negative for abdominal distention, abdominal pain, blood in stool, nausea and vomiting.   Endocrine: Negative for cold intolerance, heat intolerance, polydipsia and polyphagia.   Genitourinary:  Negative for difficulty urinating.   Musculoskeletal:  Negative for arthralgias, back pain and joint swelling.   Allergic/Immunologic: Negative for environmental  "allergies and food allergies.   Neurological:  Negative for facial asymmetry, speech difficulty, light-headedness, numbness and headaches.   Psychiatric/Behavioral:  Negative for agitation, confusion and dysphoric mood. The patient is not nervous/anxious and is not hyperactive.        Objective   Vitals:  /73   Pulse 64   Ht 1.702 m (5' 7\")   Wt 54.9 kg (121 lb)   SpO2 93%   BMI 18.95 kg/m²       Physical Exam  Constitutional:       Appearance: Normal appearance.   HENT:      Head: Normocephalic and atraumatic.      Nose: No congestion or rhinorrhea.      Mouth/Throat:      Mouth: Mucous membranes are moist.   Eyes:      Pupils: Pupils are equal, round, and reactive to light.   Cardiovascular:      Rate and Rhythm: Normal rate and regular rhythm.      Pulses: Normal pulses.      Heart sounds: Normal heart sounds. No murmur heard.     No friction rub. No gallop.   Pulmonary:      Effort: Pulmonary effort is normal. No respiratory distress.      Breath sounds: Normal breath sounds. No stridor. No wheezing or rales.   Abdominal:      General: Abdomen is flat. Bowel sounds are normal. There is no distension.      Palpations: Abdomen is soft. There is no mass.      Tenderness: There is no guarding.      Hernia: No hernia is present.   Musculoskeletal:         General: No swelling. Normal range of motion.      Cervical back: No rigidity.      Right lower leg: No edema.      Left lower leg: No edema.   Skin:     General: Skin is warm and dry.   Neurological:      General: No focal deficit present.      Mental Status: He is alert and oriented to person, place, and time. Mental status is at baseline.      Motor: No weakness.      Gait: Gait normal.   Psychiatric:         Mood and Affect: Mood normal.         Behavior: Behavior normal.         Thought Content: Thought content normal.         Assessment & Plan  Routine general medical examination at health care facility    Orders:    1 Year Follow Up In Advanced " Primary Care - PCP - Wellness Exam; Future    Encounter for screening prostate specific antigen (PSA) measurement    Orders:    Hemoglobin A1c; Future    Prostate Spec.Ag,Screen; Future    Type 2 diabetes mellitus without complication, with long-term current use of insulin    Orders:    Hemoglobin A1c; Future    Prostate Spec.Ag,Screen; Future    Screen for colon cancer    Orders:    Colonoscopy Screening; Average Risk Patient; Future    Pericardial effusion ([facility]-HCC)  No evidence of cardiac tamponade.  Continue following with cardiology.  Blood pressure controlled         Chronic respiratory failure with hypoxia  On supplemental O2 chronically.  Continue following up with pulmonary medicine.  No recent flares.         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  Repeat renal function and continue following with nephrology and urology

## 2025-05-14 NOTE — PATIENT INSTRUCTIONS
We kindly ask that you take the lead and scheduling your referral appointments to ensure they align best with your availability by calling [phone].  For laboratory tests we encourage you to schedule an appointment online [email or web address] but walk-ins are available as well.  For radiology testing you can call [phone] or [phone] to schedule.  If for any reason you are having difficulty scheduling your appointments please feel free to reach out at our office by calling [phone] to assist further

## 2025-05-14 NOTE — ASSESSMENT & PLAN NOTE
On supplemental O2 chronically.  Continue following up with pulmonary medicine.  No recent flares.

## 2025-05-14 NOTE — ASSESSMENT & PLAN NOTE
No evidence of cardiac tamponade.  Continue following with cardiology.  Blood pressure controlled

## 2025-05-15 ENCOUNTER — APPOINTMENT (OUTPATIENT)
Dept: CARDIAC REHAB | Facility: HOSPITAL | Age: 75
End: 2025-05-15
Payer: MEDICARE

## 2025-05-20 ENCOUNTER — APPOINTMENT (OUTPATIENT)
Dept: CARDIAC REHAB | Facility: HOSPITAL | Age: 75
End: 2025-05-20
Payer: MEDICARE

## 2025-05-22 ENCOUNTER — OFFICE VISIT (OUTPATIENT)
Dept: PULMONOLOGY | Facility: HOSPITAL | Age: 75
End: 2025-05-22
Payer: MEDICARE

## 2025-05-22 ENCOUNTER — HOSPITAL ENCOUNTER (OUTPATIENT)
Dept: RESPIRATORY THERAPY | Facility: HOSPITAL | Age: 75
Discharge: HOME | End: 2025-05-22
Payer: MEDICARE

## 2025-05-22 VITALS
DIASTOLIC BLOOD PRESSURE: 73 MMHG | OXYGEN SATURATION: 94 % | HEART RATE: 58 BPM | BODY MASS INDEX: 18.79 KG/M2 | WEIGHT: 120 LBS | TEMPERATURE: 98.2 F | SYSTOLIC BLOOD PRESSURE: 130 MMHG

## 2025-05-22 DIAGNOSIS — J96.11 CHRONIC RESPIRATORY FAILURE WITH HYPOXIA: Primary | ICD-10-CM

## 2025-05-22 DIAGNOSIS — J44.9 CHRONIC OBSTRUCTIVE PULMONARY DISEASE, UNSPECIFIED COPD TYPE (MULTI): ICD-10-CM

## 2025-05-22 DIAGNOSIS — J98.4 CYSTIC-BULLOUS DISEASE OF LUNG: ICD-10-CM

## 2025-05-22 DIAGNOSIS — J96.11 CHRONIC RESPIRATORY FAILURE WITH HYPOXIA: ICD-10-CM

## 2025-05-22 LAB
MGC ASCENT PFT - FEV1 - PRE: 0.79
MGC ASCENT PFT - FEV1 - PREDICTED: 2.65
MGC ASCENT PFT - FVC - PRE: 3.05
MGC ASCENT PFT - FVC - PREDICTED: 3.51

## 2025-05-22 PROCEDURE — 94618 PULMONARY STRESS TESTING: CPT

## 2025-05-22 PROCEDURE — 99214 OFFICE O/P EST MOD 30 MIN: CPT | Performed by: STUDENT IN AN ORGANIZED HEALTH CARE EDUCATION/TRAINING PROGRAM

## 2025-05-22 PROCEDURE — 3078F DIAST BP <80 MM HG: CPT | Performed by: STUDENT IN AN ORGANIZED HEALTH CARE EDUCATION/TRAINING PROGRAM

## 2025-05-22 PROCEDURE — 1159F MED LIST DOCD IN RCRD: CPT | Performed by: STUDENT IN AN ORGANIZED HEALTH CARE EDUCATION/TRAINING PROGRAM

## 2025-05-22 PROCEDURE — 1126F AMNT PAIN NOTED NONE PRSNT: CPT | Performed by: STUDENT IN AN ORGANIZED HEALTH CARE EDUCATION/TRAINING PROGRAM

## 2025-05-22 PROCEDURE — 94726 PLETHYSMOGRAPHY LUNG VOLUMES: CPT

## 2025-05-22 PROCEDURE — 3075F SYST BP GE 130 - 139MM HG: CPT | Performed by: STUDENT IN AN ORGANIZED HEALTH CARE EDUCATION/TRAINING PROGRAM

## 2025-05-22 PROCEDURE — RXMED WILLOW AMBULATORY MEDICATION CHARGE

## 2025-05-22 PROCEDURE — 4010F ACE/ARB THERAPY RXD/TAKEN: CPT | Performed by: STUDENT IN AN ORGANIZED HEALTH CARE EDUCATION/TRAINING PROGRAM

## 2025-05-22 RX ORDER — ALBUTEROL SULFATE 90 UG/1
2 INHALANT RESPIRATORY (INHALATION) EVERY 4 HOURS PRN
Qty: 18 G | Refills: 11 | Status: SHIPPED | OUTPATIENT
Start: 2025-05-22

## 2025-05-22 RX ORDER — ALBUTEROL SULFATE 90 UG/1
2 INHALANT RESPIRATORY (INHALATION) EVERY 4 HOURS PRN
Qty: 18 G | Refills: 11 | Status: SHIPPED | OUTPATIENT
Start: 2025-05-22 | End: 2025-05-22 | Stop reason: SDUPTHER

## 2025-05-22 RX ORDER — IPRATROPIUM BROMIDE AND ALBUTEROL SULFATE 2.5; .5 MG/3ML; MG/3ML
3 SOLUTION RESPIRATORY (INHALATION) EVERY 8 HOURS PRN
Qty: 1260 ML | Refills: 3 | Status: SHIPPED | OUTPATIENT
Start: 2025-05-22

## 2025-05-22 ASSESSMENT — LIFESTYLE VARIABLES
HOW OFTEN DO YOU HAVE A DRINK CONTAINING ALCOHOL: NEVER
AUDIT-C TOTAL SCORE: 0
SKIP TO QUESTIONS 9-10: 1
HOW MANY STANDARD DRINKS CONTAINING ALCOHOL DO YOU HAVE ON A TYPICAL DAY: PATIENT DOES NOT DRINK
HOW OFTEN DO YOU HAVE SIX OR MORE DRINKS ON ONE OCCASION: NEVER

## 2025-05-22 ASSESSMENT — PAIN SCALES - GENERAL: PAINLEVEL_OUTOF10: 0-NO PAIN

## 2025-05-22 NOTE — PROGRESS NOTES
Department of Medicine I Division of Pulmonary, Critical Care, and Sleep Medicine   [id number] [place] [address] [address] [place] [address]  Phone: [phone]  Fax: [phone]    History of Present Illness   Charly Cornejo is a 74 y.o. male presenting with CDOPD. He also has a history of  htn, COPD on Breztri (PFTs, moderate obstruction per report in 2022), BPH, bilateral lower lobe wedge resections on hte right and IMTIAZ, LLL wedge resectoins and LLL bullectomy (2021) by Dr. Escalera initially from PTX after fall, nephrectomy (renal mass).    He has been doing well and since last being seen hs not needed steroids or antibiotics. He has been tolerating the breztri and gives himself 2 treatments of ipratropium-albuterol daily (AM/PM) and maybe 1 extra/day. He hasn't been using albuterol HFA. At last visit we prescribed roflumilast which he stopped due to flushing and headaches.    Mr. Cornejo called bout scheduling pulmonary rehab. Unfortunately his insurance didn't cover cost or transportation and cost is prohibitive.     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 The vocal cords appear        normal. The subglottic space is normal. The trachea is of normal        caliber. The eloy is sharp. The tracheobronchial tree was examined to        at least the first subsegmental level. Bronchial mucosa and anatomy are         normal; there are no endobronchial lesions.       Bilateral Lung Abnormalities: Copious, mucoid, thick secretions were        found throughout the tracheobronchial tree (left > right). They were        partially obstructing the airway on the right, and completely        obstructing on the left.       There were no lesions, no bleeding, no       Therapeutic suctioning was performed in the entire tracheobronchial        tree. Mucus and mucus plugs were removed from the airway and the airway        was cleared. The suctioned material was sent for bacterial, AFB and        fungal analysis.        Referred by:  Abi Villarreal MD, for COPD. I have independently interviewed and examined the patient in the office and reviewed available records.     Review of Systems  Review of Systems  All other review of systems are negative and/or non-contributory.    Past Medical History   He has a past medical history of Acute bronchitis (06/13/2023), Acute urinary retention (06/13/2023), Arthritis, Asteroid hyalosis, Cataract, COPD (chronic obstructive pulmonary disease) (Multi), Diabetic retinopathy (Multi), Glaucoma suspect of both eyes, Hyperlipidemia, Hypertension, Neuropathy, Other disorders of lung, Personal history of other diseases of the circulatory system (03/14/2017), Personal history of other diseases of the circulatory system, Personal history of other diseases of the respiratory system, and Personal history of other specified conditions.    Immunizations     Immunization History   Administered Date(s) Administered    Flu vaccine (IIV4), preservative free *Check age/dose* 12/14/2022    Flu vaccine, quadrivalent, high-dose, preservative free, age 65y+ (FLUZONE) 11/07/2023    Flu vaccine, quadrivalent, recombinant, preservative free, adult (FLUBLOK) 12/27/2021    Flu vaccine, trivalent, preservative free, HIGH-DOSE, age 65y+ (Fluzone) 09/17/2024    Moderna COVID-19 vaccine, bivalent, blue cap/gray label *Check  age/dose* 09/24/2022    Moderna SARS-CoV-2 Vaccination 04/27/2021, 06/04/2021, 12/27/2021    Pneumococcal conjugate vaccine, 20-valent (PREVNAR 20) 11/07/2023    Zoster, live 03/09/2016       Medications and Allergies     Current Outpatient Medications   Medication Instructions    albuterol 90 mcg/actuation inhaler 2 puffs, Every 4 hours PRN    allopurinol (ZYLOPRIM) 100 mg, Daily RT    amLODIPine (NORVASC) 10 mg, oral, Daily RT    budesonide-glycopyr-formoterol (Breztri Aerosphere) 160-9-4.8 mcg/actuation HFA aerosol inhaler 2 puffs, inhalation, 2 times daily    calcitriol (ROCALTROL) 0.25 mcg, oral, Daily    Farxiga 10 mg, oral, Daily    ferrous sulfate 325 (65 Fe) MG tablet 1 tablet    finasteride (PROSCAR) 5 mg, oral, Daily    folic acid (FOLVITE) 1 mg, oral, Daily    gabapentin (NEURONTIN) 200 mg, oral, 3 times daily    ipratropium-albuteroL (Duo-Neb) 0.5-2.5 mg/3 mL nebulizer solution INHALE THE CONTENTS OF 1   VIAL VIA NEBULIZER 4 TIMES A DAY    losartan (COZAAR) 50 mg, oral, Daily    metoprolol succinate XL (TOPROL-XL) 25 mg, oral, Daily    omeprazole (PriLOSEC) 20 mg DR capsule 1 capsule, Daily    oxygen (O2) therapy 4 Liter O2 - CONTINUOUS (route: Oxygen)    polyethylene glycol (GLYCOLAX, MIRALAX) 17 g, Daily PRN    roflumilast (DALIRESP) 250 mcg, oral, Daily    rosuvastatin (CRESTOR) 20 mg, oral, Nightly    tamsulosin (FLOMAX) 0.4 mg, oral, Daily      Aspirin and Azithromycin    Social History   He reports that he quit smoking about 3 months ago. His smoking use included cigarettes. He has never used smokeless tobacco. He reports that he does not currently use alcohol. He reports that he does not use drugs.    Family History     Family History   Problem Relation Name Age of Onset    Diabetes Mother      Leukemia Mother      Diabetes Father      Diabetes Brother           Surgical History   He has a past surgical history that includes Other surgical history (10/13/2021); Other surgical history  "(10/22/2021); Hernia repair (10/16/2017); FL guided abdominal paracentesis (07/06/2020); Cataract extraction; Colonoscopy; Upper gastrointestinal endoscopy; Other surgical history; Other surgical history; and Other surgical history.    Physical Exam   /73   Pulse 58   Temp 36.8 °C (98.2 °F) (Temporal)   Wt 54.4 kg (120 lb)   SpO2 94%   BMI 18.79 kg/m²      Physical Exam  Gen: pleasant, conversational, no distress  HENT: MMM, eyes non-icteric, NC in place  Lungs: wet cough, clears throat often, diminished at bases without wheezing  Abdomen: No epigastric tenderness, +BS  Extremities: trace LE edema  Psych: appropriate mood for situation  Results   Pulmonary Function Tests:  Failed to redirect to the Timeline version of the [employer] SmartLink.    No results found for: \"FEV1\", \"[id number]\"    Chest Radiograph:  XR chest 1 view 01/22/2025    Narrative  * * *Final Report* * *    DATE OF EXAM: Jan 22 2025  5:51PM    EUX   [id number]  -  XR CHEST 1V FRONTAL PORT  / ACCESSION #  [id number]    PROCEDURE REASON: Shortness of breath    * * * * Physician Interpretation * * * *    RESULT: EXAMINATION:  CHEST RADIOGRAPH (PORTABLE SINGLE VIEW AP)    Exam Date/Time:  1/22/2025 5:51 PM  CLINICAL HISTORY: Shortness of breath  MQ:  XCPR_5  Comparison:  1/17/2025    RESULT:    Lines, tubes, and devices:  None.    Lungs and pleura:  There is pulmonary emphysema and staple material in  both lungs from prior lung surgery. No definite new pulmonary  consolidation. Vague small new hazy opacity right lower lobe is probably  just related to the overlap of soft tissue, pulmonary vessels and ribs  however a small developing groundglass infiltrate in this area is not  entirely excluded. There is no pleural effusion. No pneumothorax.    Cardiomediastinal silhouette:  Stable cardiomediastinal silhouette.    Other:  .    Impression  IMPRESSION:    See result                  Transcribed Using Voice Recognition  Transcribe Date/Time: Jan 22 2025  " "6:11P    Dictated by: DIVYA SNELL MD    This examination was interpreted and the report reviewed and  electronically signed by:  DIVYA SNELL MD on Jan 22 2025  6:16PM  EST      Chest CT Scan:  No results found for this or any previous visit from the past 365 days.       ECHO:  No results found for this or any previous visit from the past 365 days.       Labs:  Lab Results   Component Value Date    WBC 3.8 (L) 01/15/2024    HGB 12.4 (L) 01/15/2024    HCT 38.6 (L) 01/15/2024     (H) 01/15/2024     01/15/2024       Lab Results   Component Value Date    CREATININE 2.27 (H) 10/11/2024    BUN 30 (H) 10/11/2024     10/11/2024    K 4.9 10/11/2024     10/11/2024    CO2 28 10/11/2024        No results found for: \"GARDENIA\", \"RF\", \"SEDRATE\"     IgE: No results found for: \"IGE\"   Respiratory Allergy Panel:  AEC:   Eosinophils Absolute (x10*3/uL)   Date Value   01/15/2024 0.09     Eosinophils % (%)   Date Value   01/15/2024 2.4       Cultures:  No results found for: \"AFBCX\"   No results found for: \"RESPCULTCYFI\"    No results found for the last 90 days.  C     Assessment and Plan   Diagnoses and all orders for this visit:  Chronic obstructive pulmonary disease, unspecified COPD type (Multi)  -     Follow Up In Pulmonology    Patient presenting with COPD class 4 E (FEV1=29% in 2024) mMRC 4,  on Breztri for the last several years and chronic respiratory failure on 4L ->2L of oxygen. His COPD is stable. He meets PFT criteria for BLVR which we discussed. Unfortunately his insurance doesn't cover pulmonary rehab.    # COPD, Gold 4E  # Chronic respiratory failure on 2L of oxygen  AATD - not yet done, ordered  IgE - not done, ordered  Inhalers: Breztri PRN: Duoneb, albuterol  Pulm rehab - cost prohibitive  Encouraged to look at difference between HFA and nebulizer - if better improvement from nebulizer will discuss switching Brezstri to nebulizer maintenance     #?BLVR  - will eval for candidacy    # " Lung Cancer Screening  - Due 2/2026          lung Cancer Screening - last CT scan I can see 9/2024 with [facility] CT scan 1/17/2025 - next due 2/17/26     I spent 60 minutes in the professional and overall care of this patient.      Follow-up:  Visit date not found     Abi Villarreal MD  05/22/2025

## 2025-05-22 NOTE — PATIENT INSTRUCTIONS
It was a pleasure to see you in pulmonary clinic today!    We discussed the following:  Continue your Breztri twice  a day  See if you notice a difference in your breathing between using your albuterol inhaler (puffer) or using the nebulizer ipratropium-albuterol (treatment) - both of these are rescue (ex: try your puffer instead of the treatment one time and see if you notice one working better than the other)  We discussed lung volume reduction by bronchoscopy today - we will wait and see if pulmonary rehab is a possibility for you    Follow-up in 6 months and  don't forget a flu vaccine in the fall!    Please contact me by eBuddy or [phone] with any questions/concerns.    Abi Villarreal MD

## 2025-05-27 ENCOUNTER — APPOINTMENT (OUTPATIENT)
Dept: CARDIAC REHAB | Facility: HOSPITAL | Age: 75
End: 2025-05-27
Payer: MEDICARE

## 2025-05-27 ENCOUNTER — PHARMACY VISIT (OUTPATIENT)
Dept: PHARMACY | Facility: CLINIC | Age: 75
End: 2025-05-27
Payer: MEDICARE

## 2025-05-29 ENCOUNTER — APPOINTMENT (OUTPATIENT)
Dept: CARDIAC REHAB | Facility: HOSPITAL | Age: 75
End: 2025-05-29
Payer: MEDICARE

## 2025-06-03 ENCOUNTER — APPOINTMENT (OUTPATIENT)
Dept: CARDIAC REHAB | Facility: HOSPITAL | Age: 75
End: 2025-06-03
Payer: MEDICARE

## 2025-06-05 ENCOUNTER — APPOINTMENT (OUTPATIENT)
Dept: CARDIAC REHAB | Facility: HOSPITAL | Age: 75
End: 2025-06-05
Payer: MEDICARE

## 2025-06-06 PROCEDURE — RXMED WILLOW AMBULATORY MEDICATION CHARGE

## 2025-06-09 ENCOUNTER — PHARMACY VISIT (OUTPATIENT)
Dept: PHARMACY | Facility: CLINIC | Age: 75
End: 2025-06-09
Payer: MEDICARE

## 2025-06-10 ENCOUNTER — APPOINTMENT (OUTPATIENT)
Dept: CARDIAC REHAB | Facility: HOSPITAL | Age: 75
End: 2025-06-10
Payer: MEDICARE

## 2025-06-10 NOTE — PROGRESS NOTES
6/11/2025 CC: 74 y.o. presents for glaucoma evaluation. Referred by Dr Workman    Past ocular history: Pseudophakia, asteroid hyalosis, GS  Family history: no family history of glaucoma   Past medical history: COPD, DM, HTN, RCC   Social history: former smoker (1/2025)    Eye medications:   Both eyes: none    Bblocker  Allergy:  Azithromycin, aspirin    Testing:    HVF 24-2 4/8/2025: OD- I rim, MD -4.6. OS- full, MD -0.2 dB    OCT 4/8/2025: OD- artifacts, thin S, bdl T/N. OS- thin T,avg 83    Pachymetry: 490/494    Gonioscopy 6/11/2025: open OU    Tmax: 15/22    Assessment:   Glaucoma suspect OU  - Negative fhx  - Thin C  - Testing: OCT- confounded by dense asteroid hyalosis OD>OS   Target IOP: mid-teens  -IOP 6/11/2025: 11/10. Monitor    Pseudophakia OU  -s/p PCIOL, Dr Workman    NPDR francisco Workman  - Last A1c 5.6 (1/2025)    Asteroid Hyalosis OU    Plan:   I explained my findings. GS, monitor    Eye medications:   Both eyes: none    Return in 6 mo, [facility]

## 2025-06-11 ENCOUNTER — APPOINTMENT (OUTPATIENT)
Dept: OPHTHALMOLOGY | Facility: CLINIC | Age: 75
End: 2025-06-11
Payer: MEDICARE

## 2025-06-11 DIAGNOSIS — H43.23 ASTEROID HYALOSIS OF BOTH EYES: ICD-10-CM

## 2025-06-11 DIAGNOSIS — H40.003 GLAUCOMA SUSPECT OF BOTH EYES: Primary | ICD-10-CM

## 2025-06-11 PROCEDURE — 92020 GONIOSCOPY: CPT | Performed by: OPHTHALMOLOGY

## 2025-06-11 PROCEDURE — 99214 OFFICE O/P EST MOD 30 MIN: CPT | Performed by: OPHTHALMOLOGY

## 2025-06-11 ASSESSMENT — VISUAL ACUITY
OS_CC: 20/25
OD_PH_CC: 20/25
OS_CC+: -3
OD_CC: 20/40
METHOD: SNELLEN - LINEAR
CORRECTION_TYPE: GLASSES

## 2025-06-11 ASSESSMENT — GONIOSCOPY
OD_INFERIOR: SS
OS_NASAL: SS
OD_NASAL: SS
OD_TEMPORAL: SS
OS_INFERIOR: SS
OS_TEMPORAL: SS
OD_SUPERIOR: SS
OS_SUPERIOR: SS

## 2025-06-11 ASSESSMENT — REFRACTION_WEARINGRX
OS_ADD: +2.75
OD_SPHERE: -1.25
OD_ADD: +2.75
OD_AXIS: 050
OS_CYLINDER: -0.50
OS_SPHERE: -0.75
OD_CYLINDER: -0.50
OS_AXIS: 085

## 2025-06-11 ASSESSMENT — TONOMETRY
OD_IOP_MMHG: 11
IOP_METHOD: GOLDMANN APPLANATION
OS_IOP_MMHG: 10

## 2025-06-11 ASSESSMENT — CUP TO DISC RATIO
OD_RATIO: 0.6
OS_RATIO: 0.6

## 2025-06-11 ASSESSMENT — EXTERNAL EXAM - RIGHT EYE: OD_EXAM: NORMAL

## 2025-06-11 ASSESSMENT — EXTERNAL EXAM - LEFT EYE: OS_EXAM: NORMAL

## 2025-06-11 ASSESSMENT — PACHYMETRY
OD_CT(UM): 490
OS_CT(UM): 494

## 2025-06-11 ASSESSMENT — SLIT LAMP EXAM - LIDS
COMMENTS: GOOD POSITION
COMMENTS: GOOD POSITION

## 2025-06-12 ENCOUNTER — APPOINTMENT (OUTPATIENT)
Dept: CARDIAC REHAB | Facility: HOSPITAL | Age: 75
End: 2025-06-12
Payer: MEDICARE

## 2025-06-13 LAB
25(OH)D3+25(OH)D2 SERPL-MCNC: 17 NG/ML (ref 30–100)
A1AT SERPL-MCNC: 168 MG/DL (ref 83–199)
ALBUMIN SERPL-MCNC: 3.9 G/DL (ref 3.6–5.1)
ALBUMIN/CREAT UR: 552 MG/G CREAT
BUN SERPL-MCNC: 19 MG/DL (ref 7–25)
BUN/CREAT SERPL: 8 (CALC) (ref 6–22)
CALCIUM SERPL-MCNC: 9.3 MG/DL (ref 8.6–10.3)
CHLORIDE SERPL-SCNC: 111 MMOL/L (ref 98–110)
CO2 SERPL-SCNC: 24 MMOL/L (ref 20–32)
CREAT SERPL-MCNC: 2.51 MG/DL (ref 0.7–1.28)
CREAT UR-MCNC: 97 MG/DL (ref 20–320)
EGFRCR SERPLBLD CKD-EPI 2021: 26 ML/MIN/1.73M2
EST. AVERAGE GLUCOSE BLD GHB EST-MCNC: 114 MG/DL
EST. AVERAGE GLUCOSE BLD GHB EST-SCNC: 6.3 MMOL/L
GLUCOSE SERPL-MCNC: 97 MG/DL (ref 65–99)
HBA1C MFR BLD: 5.6 %
IGE SERPL-ACNC: 117 KU/L
MICROALBUMIN UR-MCNC: 53.5 MG/DL
PHOSPHATE SERPL-MCNC: 4 MG/DL (ref 2.1–4.3)
POTASSIUM SERPL-SCNC: 3.9 MMOL/L (ref 3.5–5.3)
PSA SERPL-MCNC: 1.03 NG/ML
PTH-INTACT SERPL-MCNC: 68 PG/ML (ref 16–77)
SODIUM SERPL-SCNC: 144 MMOL/L (ref 135–146)

## 2025-06-16 DIAGNOSIS — N18.4 STAGE 4 CHRONIC KIDNEY DISEASE (MULTI): Primary | ICD-10-CM

## 2025-06-16 RX ORDER — ERGOCALCIFEROL 1.25 MG/1
1.25 CAPSULE ORAL WEEKLY
Qty: 12 CAPSULE | Refills: 0 | Status: SHIPPED | OUTPATIENT
Start: 2025-06-16 | End: 2025-09-08

## 2025-06-17 ENCOUNTER — APPOINTMENT (OUTPATIENT)
Dept: CARDIAC REHAB | Facility: HOSPITAL | Age: 75
End: 2025-06-17
Payer: MEDICARE

## 2025-06-19 ENCOUNTER — APPOINTMENT (OUTPATIENT)
Dept: CARDIAC REHAB | Facility: HOSPITAL | Age: 75
End: 2025-06-19
Payer: MEDICARE

## 2025-06-19 PROCEDURE — RXMED WILLOW AMBULATORY MEDICATION CHARGE

## 2025-06-20 ENCOUNTER — PHARMACY VISIT (OUTPATIENT)
Dept: PHARMACY | Facility: CLINIC | Age: 75
End: 2025-06-20
Payer: MEDICARE

## 2025-06-21 PROCEDURE — RXMED WILLOW AMBULATORY MEDICATION CHARGE

## 2025-06-23 DIAGNOSIS — G62.9 NEUROPATHY: ICD-10-CM

## 2025-06-23 PROCEDURE — RXMED WILLOW AMBULATORY MEDICATION CHARGE

## 2025-06-23 RX ORDER — GABAPENTIN 100 MG/1
200 CAPSULE ORAL 3 TIMES DAILY
Qty: 540 CAPSULE | Refills: 0 | Status: SHIPPED | OUTPATIENT
Start: 2025-06-23

## 2025-06-24 ENCOUNTER — APPOINTMENT (OUTPATIENT)
Dept: CARDIAC REHAB | Facility: HOSPITAL | Age: 75
End: 2025-06-24
Payer: MEDICARE

## 2025-06-24 ENCOUNTER — PHARMACY VISIT (OUTPATIENT)
Dept: PHARMACY | Facility: CLINIC | Age: 75
End: 2025-06-24
Payer: MEDICARE

## 2025-06-25 ENCOUNTER — PHARMACY VISIT (OUTPATIENT)
Dept: PHARMACY | Facility: CLINIC | Age: 75
End: 2025-06-25
Payer: MEDICARE

## 2025-06-26 ENCOUNTER — APPOINTMENT (OUTPATIENT)
Dept: CARDIAC REHAB | Facility: HOSPITAL | Age: 75
End: 2025-06-26
Payer: MEDICARE

## 2025-07-01 ENCOUNTER — APPOINTMENT (OUTPATIENT)
Dept: CARDIAC REHAB | Facility: HOSPITAL | Age: 75
End: 2025-07-01
Payer: MEDICARE

## 2025-07-02 PROCEDURE — RXMED WILLOW AMBULATORY MEDICATION CHARGE

## 2025-07-03 ENCOUNTER — APPOINTMENT (OUTPATIENT)
Dept: CARDIAC REHAB | Facility: HOSPITAL | Age: 75
End: 2025-07-03
Payer: MEDICARE

## 2025-07-03 ENCOUNTER — PHARMACY VISIT (OUTPATIENT)
Dept: PHARMACY | Facility: CLINIC | Age: 75
End: 2025-07-03
Payer: MEDICARE

## 2025-07-08 ENCOUNTER — APPOINTMENT (OUTPATIENT)
Dept: CARDIAC REHAB | Facility: HOSPITAL | Age: 75
End: 2025-07-08
Payer: MEDICARE

## 2025-07-10 ENCOUNTER — APPOINTMENT (OUTPATIENT)
Dept: CARDIAC REHAB | Facility: HOSPITAL | Age: 75
End: 2025-07-10
Payer: MEDICARE

## 2025-07-10 PROCEDURE — RXMED WILLOW AMBULATORY MEDICATION CHARGE

## 2025-07-11 ENCOUNTER — PHARMACY VISIT (OUTPATIENT)
Dept: PHARMACY | Facility: CLINIC | Age: 75
End: 2025-07-11
Payer: MEDICARE

## 2025-07-15 ENCOUNTER — APPOINTMENT (OUTPATIENT)
Dept: CARDIAC REHAB | Facility: HOSPITAL | Age: 75
End: 2025-07-15
Payer: MEDICARE

## 2025-07-17 ENCOUNTER — APPOINTMENT (OUTPATIENT)
Dept: CARDIAC REHAB | Facility: HOSPITAL | Age: 75
End: 2025-07-17
Payer: MEDICARE

## 2025-07-19 PROCEDURE — RXMED WILLOW AMBULATORY MEDICATION CHARGE

## 2025-07-22 ENCOUNTER — PHARMACY VISIT (OUTPATIENT)
Dept: PHARMACY | Facility: CLINIC | Age: 75
End: 2025-07-22
Payer: MEDICARE

## 2025-07-22 ENCOUNTER — APPOINTMENT (OUTPATIENT)
Dept: CARDIAC REHAB | Facility: HOSPITAL | Age: 75
End: 2025-07-22
Payer: MEDICARE

## 2025-07-23 DIAGNOSIS — Z12.11 COLON CANCER SCREENING: ICD-10-CM

## 2025-07-23 PROCEDURE — RXMED WILLOW AMBULATORY MEDICATION CHARGE

## 2025-07-23 RX ORDER — POLYETHYLENE GLYCOL 3350, SODIUM SULFATE ANHYDROUS, SODIUM BICARBONATE, SODIUM CHLORIDE, POTASSIUM CHLORIDE 236; 22.74; 6.74; 5.86; 2.97 G/4L; G/4L; G/4L; G/4L; G/4L
POWDER, FOR SOLUTION ORAL
Qty: 4000 ML | Refills: 0 | Status: SHIPPED | OUTPATIENT
Start: 2025-07-23

## 2025-07-24 ENCOUNTER — PHARMACY VISIT (OUTPATIENT)
Dept: PHARMACY | Facility: CLINIC | Age: 75
End: 2025-07-24
Payer: MEDICARE

## 2025-07-24 ENCOUNTER — APPOINTMENT (OUTPATIENT)
Dept: CARDIAC REHAB | Facility: HOSPITAL | Age: 75
End: 2025-07-24
Payer: MEDICARE

## 2025-07-25 ENCOUNTER — PHARMACY VISIT (OUTPATIENT)
Dept: PHARMACY | Facility: CLINIC | Age: 75
End: 2025-07-25

## 2025-07-29 ENCOUNTER — APPOINTMENT (OUTPATIENT)
Dept: CARDIAC REHAB | Facility: HOSPITAL | Age: 75
End: 2025-07-29
Payer: MEDICARE

## 2025-07-31 ENCOUNTER — APPOINTMENT (OUTPATIENT)
Dept: CARDIAC REHAB | Facility: HOSPITAL | Age: 75
End: 2025-07-31
Payer: MEDICARE

## 2025-08-01 ENCOUNTER — TELEPHONE (OUTPATIENT)
Dept: PRIMARY CARE | Facility: CLINIC | Age: 75
End: 2025-08-01
Payer: MEDICARE

## 2025-08-01 DIAGNOSIS — M79.673 PAIN OF FOOT, UNSPECIFIED LATERALITY: Primary | ICD-10-CM

## 2025-08-05 ENCOUNTER — APPOINTMENT (OUTPATIENT)
Dept: CARDIAC REHAB | Facility: HOSPITAL | Age: 75
End: 2025-08-05
Payer: MEDICARE

## 2025-08-06 PROCEDURE — RXMED WILLOW AMBULATORY MEDICATION CHARGE

## 2025-08-07 ENCOUNTER — HOSPITAL ENCOUNTER (OUTPATIENT)
Dept: GASTROENTEROLOGY | Facility: HOSPITAL | Age: 75
Discharge: HOME | End: 2025-08-07
Payer: MEDICARE

## 2025-08-07 ENCOUNTER — PHARMACY VISIT (OUTPATIENT)
Dept: PHARMACY | Facility: CLINIC | Age: 75
End: 2025-08-07
Payer: COMMERCIAL

## 2025-08-07 ENCOUNTER — ANESTHESIA EVENT (OUTPATIENT)
Dept: GASTROENTEROLOGY | Facility: HOSPITAL | Age: 75
End: 2025-08-07
Payer: MEDICARE

## 2025-08-07 ENCOUNTER — APPOINTMENT (OUTPATIENT)
Dept: CARDIAC REHAB | Facility: HOSPITAL | Age: 75
End: 2025-08-07
Payer: MEDICARE

## 2025-08-07 ENCOUNTER — ANESTHESIA (OUTPATIENT)
Dept: GASTROENTEROLOGY | Facility: HOSPITAL | Age: 75
End: 2025-08-07
Payer: MEDICARE

## 2025-08-07 VITALS
TEMPERATURE: 97.7 F | DIASTOLIC BLOOD PRESSURE: 87 MMHG | SYSTOLIC BLOOD PRESSURE: 122 MMHG | HEART RATE: 47 BPM | OXYGEN SATURATION: 98 % | RESPIRATION RATE: 14 BRPM

## 2025-08-07 DIAGNOSIS — Z12.11 SCREEN FOR COLON CANCER: ICD-10-CM

## 2025-08-07 PROCEDURE — 3700000001 HC GENERAL ANESTHESIA TIME - INITIAL BASE CHARGE

## 2025-08-07 PROCEDURE — 3700000002 HC GENERAL ANESTHESIA TIME - EACH INCREMENTAL 1 MINUTE

## 2025-08-07 PROCEDURE — 2500000004 HC RX 250 GENERAL PHARMACY W/ HCPCS (ALT 636 FOR OP/ED): Performed by: REGISTERED NURSE

## 2025-08-07 PROCEDURE — 7100000009 HC PHASE TWO TIME - INITIAL BASE CHARGE

## 2025-08-07 PROCEDURE — 7100000010 HC PHASE TWO TIME - EACH INCREMENTAL 1 MINUTE

## 2025-08-07 PROCEDURE — 45385 COLONOSCOPY W/LESION REMOVAL: CPT | Performed by: INTERNAL MEDICINE

## 2025-08-07 PROCEDURE — RXMED WILLOW AMBULATORY MEDICATION CHARGE

## 2025-08-07 RX ORDER — LIDOCAINE HYDROCHLORIDE 20 MG/ML
INJECTION, SOLUTION EPIDURAL; INFILTRATION; INTRACAUDAL; PERINEURAL AS NEEDED
Status: DISCONTINUED | OUTPATIENT
Start: 2025-08-07 | End: 2025-08-07

## 2025-08-07 RX ORDER — PROPOFOL 10 MG/ML
INJECTION, EMULSION INTRAVENOUS CONTINUOUS PRN
Status: DISCONTINUED | OUTPATIENT
Start: 2025-08-07 | End: 2025-08-07

## 2025-08-07 RX ADMIN — PROPOFOL 150 MCG/KG/MIN: 10 INJECTION, EMULSION INTRAVENOUS at 12:48

## 2025-08-07 RX ADMIN — LIDOCAINE HYDROCHLORIDE 60 MG: 20 INJECTION, SOLUTION EPIDURAL; INFILTRATION; INTRACAUDAL; PERINEURAL at 12:48

## 2025-08-07 RX ADMIN — PROPOFOL 50 MG: 10 INJECTION, EMULSION INTRAVENOUS at 12:49

## 2025-08-07 RX ADMIN — SODIUM CHLORIDE: 9 INJECTION, SOLUTION INTRAVENOUS at 12:41

## 2025-08-07 SDOH — HEALTH STABILITY: MENTAL HEALTH: CURRENT SMOKER: 0

## 2025-08-07 ASSESSMENT — PAIN SCALES - GENERAL
PAINLEVEL_OUTOF10: 0 - NO PAIN
PAIN_LEVEL: 0

## 2025-08-07 ASSESSMENT — PAIN - FUNCTIONAL ASSESSMENT
PAIN_FUNCTIONAL_ASSESSMENT: 0-10

## 2025-08-07 NOTE — ANESTHESIA POSTPROCEDURE EVALUATION
Patient: Charly Cornejo    Procedure Summary       Date: 08/07/25 Room / Location: [facility]    Anesthesia Start: 1241 Anesthesia Stop: 1324    Procedure: COLONOSCOPY Diagnosis: Screen for colon cancer    Scheduled Providers: Tiera Trotter MD, MS; UMU Damon-SIERRA, DNAP; Maddy Gupta MD Responsible Provider: Maddy Gupta MD    Anesthesia Type: MAC ASA Status: 3            Anesthesia Type: MAC    Vitals Value Taken Time   /72 08/07/25 13:33   Temp 36.5 °C (97.7 °F) 08/07/25 13:18   Pulse 47 08/07/25 13:48   Resp 14 08/07/25 13:48   SpO2 98 % 08/07/25 13:48       Anesthesia Post Evaluation    Patient location during evaluation: PACU  Patient participation: complete - patient participated  Level of consciousness: awake  Pain score: 0  Pain management: adequate  Airway patency: patent  Cardiovascular status: hemodynamically stable  Respiratory status: acceptable  Hydration status: acceptable  Postoperative Nausea and Vomiting: none        No notable events documented.

## 2025-08-07 NOTE — H&P
History Of Present Illness  Charly Cornejo is a 75 y.o. male presenting with colonoscopy.     Past Medical History  Medical History[1]  Surgical History  Surgical History[2]  Social History  He reports that he quit smoking about 6 months ago. His smoking use included cigarettes. He has never used smokeless tobacco. He reports that he does not currently use alcohol. He reports that he does not use drugs.    Family History  Family History[3]     Allergies  Allergies[4]  Review of Systems     Physical Exam     Last Recorded Vitals  Blood pressure 163/67, pulse 56, temperature 36.5 °C (97.7 °F), temperature source Temporal, resp. rate 20, SpO2 97%.    Assessment/Plan   Cscope     Tiera Trotter MD, MS       [1]   Past Medical History:  Diagnosis Date    Acute bronchitis 06/13/2023    Acute urinary retention 06/13/2023    Arthritis     Asteroid hyalosis     Cataract     Chronic kidney disease     Colon polyp     COPD (chronic obstructive pulmonary disease) (Multi)     4-L O2 NC at all times    Diabetic retinopathy (Multi)     Glaucoma suspect of both eyes     Hyperlipidemia     Hypertension     Neuropathy     Other disorders of lung     Lung trouble    Personal history of other diseases of the circulatory system 03/14/2017    History of abnormal electrocardiography    Personal history of other diseases of the circulatory system     History of essential hypertension    Personal history of other diseases of the respiratory system     History of chronic obstructive lung disease    Personal history of other specified conditions     History of shortness of breath   [2]   Past Surgical History:  Procedure Laterality Date    CATARACT EXTRACTION      left eye    COLONOSCOPY      FL GUIDED ABDOMINAL PARACENTESIS  07/06/2020    FL GUIDED ABDOMINAL PARACENTESIS 7/6/2020 [facility]    HERNIA REPAIR  10/16/2017    Hernia Repair Inguinal Sliding    OTHER SURGICAL HISTORY  10/13/2021    Lung wedge resection    OTHER SURGICAL  HISTORY  10/22/2021    Lung wedge resection    OTHER SURGICAL HISTORY      bronchoscopy    OTHER SURGICAL HISTORY      left kidney removed (cancer)    OTHER SURGICAL HISTORY      partial thyroidectomy    UPPER GASTROINTESTINAL ENDOSCOPY     [3]   Family History  Problem Relation Name Age of Onset    Diabetes Mother      Leukemia Mother      Diabetes Father      Diabetes Brother     [4]   Allergies  Allergen Reactions    Aspirin Nausea And Vomiting and Other     GI Upset    Azithromycin Rash

## 2025-08-07 NOTE — PERIOPERATIVE NURSING NOTE
1318 Arrival to post endo. Drowsy but arousable. Niece not here.    1342 Awake and alert. Tolerating PO.     1348 Discharge instructions reviewed, verbalized understanding. PIV removed and pt assisted to get dressed. Assisted patient to bathroom. Has home oxygen, 4L.     1420 Waiting on ride.    1443 Discharged to home.

## 2025-08-07 NOTE — ANESTHESIA PREPROCEDURE EVALUATION
Patient: Charly Cornejo    Procedure Information       Date/Time: 08/07/25 1130    Scheduled providers: Tiera Trotter MD, MS; UMU Damon-SIERRA, DNAP; Maddy Gupta MD    Procedure: COLONOSCOPY    [facility]            Relevant Problems   Cardiac   (+) Abnormal EKG   (+) Benign essential hypertension   (+) Hypercholesteremia   (+) Mixed hyperlipidemia   (+) Peripheral vascular disease      Pulmonary   (+) COPD (chronic obstructive pulmonary disease) (Multi)   (+) Lung nodules      GI   (+) Gastro-esophageal reflux disease without esophagitis   (+) Gastroesophageal reflux disease      /Renal   (+) BPH (benign prostatic hyperplasia)   (+) Calculus of kidney with calculus of ureter   (+) Nephrolithiasis   (+) Renal cell carcinoma      Liver   (+) Chronic pancreatitis (Multi)   (+) Liver lesion   (+) Other cirrhosis of liver      Endocrine   (+) Diabetic neuropathy (Multi)   (+) Hyperparathyroidism (Multi)   (+) Mild nonproliferative diabetic retinopathy of both eyes without macular edema   (+) Nontoxic single thyroid nodule   (+) Primary hyperparathyroidism (Multi)      Hematology   (+) Anemia   (+) Anemia due to chronic kidney disease   (+) Thrombocytopenia       Clinical information reviewed:   Tobacco  Allergies  Meds  Problems  Med Hx  Surg Hx   Fam Hx  Soc   Hx        NPO Detail:  NPO/Void Status  Date of Last Liquid: 08/07/25  Time of Last Liquid: 0800  Date of Last Solid: 08/06/25  Time of Last Solid: 1700  Last Intake Type: Clear fluids  Time of Last Void: 1115         Physical Exam    Airway  Mallampati: II  TM distance: >3 FB  Neck ROM: full  Mouth opening: 3 or more finger widths     Cardiovascular   Rhythm: regular     Dental     (+) upper dentures, lower dentures     Pulmonary Breath sounds clear to auscultation  Comments: On 3 l NC   Abdominal            Anesthesia Plan    History of general anesthesia?: yes  History of complications of general anesthesia?:  no    ASA 3     MAC     The patient is not a current smoker.    intravenous induction   Anesthetic plan and risks discussed with patient.    Plan discussed with CRNA and CAA.

## 2025-08-08 ENCOUNTER — PHARMACY VISIT (OUTPATIENT)
Dept: PHARMACY | Facility: CLINIC | Age: 75
End: 2025-08-08
Payer: MEDICARE

## 2025-08-08 ASSESSMENT — PAIN SCALES - GENERAL: PAINLEVEL_OUTOF10: 0 - NO PAIN

## 2025-08-11 ENCOUNTER — APPOINTMENT (OUTPATIENT)
Dept: NEPHROLOGY | Facility: CLINIC | Age: 75
End: 2025-08-11
Payer: MEDICARE

## 2025-08-11 VITALS
WEIGHT: 120.2 LBS | SYSTOLIC BLOOD PRESSURE: 133 MMHG | BODY MASS INDEX: 18.87 KG/M2 | HEIGHT: 67 IN | OXYGEN SATURATION: 92 % | TEMPERATURE: 98 F | DIASTOLIC BLOOD PRESSURE: 77 MMHG | HEART RATE: 74 BPM

## 2025-08-11 DIAGNOSIS — N18.4 STAGE 4 CHRONIC KIDNEY DISEASE (MULTI): Primary | ICD-10-CM

## 2025-08-11 PROCEDURE — 1159F MED LIST DOCD IN RCRD: CPT | Performed by: INTERNAL MEDICINE

## 2025-08-11 PROCEDURE — 3078F DIAST BP <80 MM HG: CPT | Performed by: INTERNAL MEDICINE

## 2025-08-11 PROCEDURE — 99213 OFFICE O/P EST LOW 20 MIN: CPT | Performed by: INTERNAL MEDICINE

## 2025-08-11 PROCEDURE — 3075F SYST BP GE 130 - 139MM HG: CPT | Performed by: INTERNAL MEDICINE

## 2025-08-11 PROCEDURE — 4010F ACE/ARB THERAPY RXD/TAKEN: CPT | Performed by: INTERNAL MEDICINE

## 2025-08-11 PROCEDURE — 1126F AMNT PAIN NOTED NONE PRSNT: CPT | Performed by: INTERNAL MEDICINE

## 2025-08-11 PROCEDURE — 3008F BODY MASS INDEX DOCD: CPT | Performed by: INTERNAL MEDICINE

## 2025-08-11 ASSESSMENT — PAIN SCALES - GENERAL: PAINLEVEL_OUTOF10: 0-NO PAIN

## 2025-08-12 ENCOUNTER — APPOINTMENT (OUTPATIENT)
Dept: CARDIAC REHAB | Facility: HOSPITAL | Age: 75
End: 2025-08-12
Payer: MEDICARE

## 2025-08-14 ENCOUNTER — APPOINTMENT (OUTPATIENT)
Dept: CARDIAC REHAB | Facility: HOSPITAL | Age: 75
End: 2025-08-14
Payer: MEDICARE

## 2025-08-15 LAB
LABORATORY COMMENT REPORT: NORMAL
PATH REPORT.FINAL DX SPEC: NORMAL
PATH REPORT.GROSS SPEC: NORMAL
PATH REPORT.TOTAL CANCER: NORMAL

## 2025-08-18 PROCEDURE — RXMED WILLOW AMBULATORY MEDICATION CHARGE

## 2025-08-19 ENCOUNTER — PHARMACY VISIT (OUTPATIENT)
Dept: PHARMACY | Facility: CLINIC | Age: 75
End: 2025-08-19
Payer: MEDICARE

## 2025-08-19 ENCOUNTER — APPOINTMENT (OUTPATIENT)
Dept: CARDIAC REHAB | Facility: HOSPITAL | Age: 75
End: 2025-08-19
Payer: MEDICARE

## 2025-08-19 ENCOUNTER — APPOINTMENT (OUTPATIENT)
Dept: PRIMARY CARE | Facility: CLINIC | Age: 75
End: 2025-08-19
Payer: MEDICARE

## 2025-08-19 VITALS
OXYGEN SATURATION: 96 % | DIASTOLIC BLOOD PRESSURE: 77 MMHG | BODY MASS INDEX: 18.95 KG/M2 | HEART RATE: 64 BPM | WEIGHT: 121 LBS | SYSTOLIC BLOOD PRESSURE: 131 MMHG

## 2025-08-19 DIAGNOSIS — E11.41 DIABETIC MONONEUROPATHY ASSOCIATED WITH TYPE 2 DIABETES MELLITUS: Primary | ICD-10-CM

## 2025-08-19 DIAGNOSIS — C64.2 RENAL CELL CARCINOMA OF LEFT KIDNEY: ICD-10-CM

## 2025-08-19 DIAGNOSIS — I10 BENIGN ESSENTIAL HYPERTENSION: ICD-10-CM

## 2025-08-19 DIAGNOSIS — E11.9 TYPE 2 DIABETES MELLITUS WITHOUT COMPLICATION, WITHOUT LONG-TERM CURRENT USE OF INSULIN: ICD-10-CM

## 2025-08-19 PROBLEM — R94.31 ABNORMAL EKG: Status: RESOLVED | Noted: 2023-09-01 | Resolved: 2025-08-19

## 2025-08-19 PROCEDURE — 1160F RVW MEDS BY RX/DR IN RCRD: CPT | Performed by: INTERNAL MEDICINE

## 2025-08-19 PROCEDURE — G2211 COMPLEX E/M VISIT ADD ON: HCPCS | Performed by: INTERNAL MEDICINE

## 2025-08-19 PROCEDURE — 3075F SYST BP GE 130 - 139MM HG: CPT | Performed by: INTERNAL MEDICINE

## 2025-08-19 PROCEDURE — 4010F ACE/ARB THERAPY RXD/TAKEN: CPT | Performed by: INTERNAL MEDICINE

## 2025-08-19 PROCEDURE — 99213 OFFICE O/P EST LOW 20 MIN: CPT | Performed by: INTERNAL MEDICINE

## 2025-08-19 PROCEDURE — 3078F DIAST BP <80 MM HG: CPT | Performed by: INTERNAL MEDICINE

## 2025-08-19 PROCEDURE — 1159F MED LIST DOCD IN RCRD: CPT | Performed by: INTERNAL MEDICINE

## 2025-08-19 ASSESSMENT — ENCOUNTER SYMPTOMS
BACK PAIN: 0
DYSPHORIC MOOD: 0
DIFFICULTY URINATING: 0
HYPERACTIVE: 0
FACIAL SWELLING: 0
HEADACHES: 0
NERVOUS/ANXIOUS: 0
SHORTNESS OF BREATH: 0
FATIGUE: 0
JOINT SWELLING: 0
FACIAL ASYMMETRY: 0
COUGH: 0
ACTIVITY CHANGE: 0
FEVER: 0
BLOOD IN STOOL: 0
POLYPHAGIA: 0
EYE DISCHARGE: 0
EYE REDNESS: 0
ABDOMINAL PAIN: 0
DIAPHORESIS: 0
SPEECH DIFFICULTY: 0
CONFUSION: 0
AGITATION: 0
ABDOMINAL DISTENTION: 0
CHOKING: 0
CHEST TIGHTNESS: 0
EYE ITCHING: 0
EYE PAIN: 0
APPETITE CHANGE: 0
NUMBNESS: 0
NAUSEA: 0
VOMITING: 0
LIGHT-HEADEDNESS: 0
ARTHRALGIAS: 0
POLYDIPSIA: 0

## 2025-08-21 ENCOUNTER — APPOINTMENT (OUTPATIENT)
Dept: CARDIAC REHAB | Facility: HOSPITAL | Age: 75
End: 2025-08-21
Payer: MEDICARE

## 2025-08-26 ENCOUNTER — APPOINTMENT (OUTPATIENT)
Dept: CARDIAC REHAB | Facility: HOSPITAL | Age: 75
End: 2025-08-26
Payer: MEDICARE

## 2025-08-28 ENCOUNTER — APPOINTMENT (OUTPATIENT)
Dept: CARDIAC REHAB | Facility: HOSPITAL | Age: 75
End: 2025-08-28
Payer: MEDICARE

## 2025-09-02 ENCOUNTER — APPOINTMENT (OUTPATIENT)
Dept: CARDIAC REHAB | Facility: HOSPITAL | Age: 75
End: 2025-09-02
Payer: MEDICARE

## 2025-12-15 ENCOUNTER — APPOINTMENT (OUTPATIENT)
Dept: NEPHROLOGY | Facility: CLINIC | Age: 75
End: 2025-12-15
Payer: MEDICARE

## 2025-12-18 ENCOUNTER — APPOINTMENT (OUTPATIENT)
Dept: OPHTHALMOLOGY | Facility: CLINIC | Age: 75
End: 2025-12-18
Payer: MEDICARE

## 2026-02-19 ENCOUNTER — APPOINTMENT (OUTPATIENT)
Dept: PRIMARY CARE | Facility: CLINIC | Age: 76
End: 2026-02-19
Payer: MEDICARE

## 2026-04-14 ENCOUNTER — APPOINTMENT (OUTPATIENT)
Dept: OPHTHALMOLOGY | Facility: CLINIC | Age: 76
End: 2026-04-14
Payer: MEDICARE

## (undated) DEVICE — APPLICATOR, COTTON TIP, 6 IN, 2PK, STERILE

## (undated) DEVICE — SOLUTION, OPHTHALMIC, TETRACAINE HCL 0.5%, 2 ML, VIAL